# Patient Record
Sex: FEMALE | Race: WHITE | NOT HISPANIC OR LATINO | Employment: OTHER | URBAN - METROPOLITAN AREA
[De-identification: names, ages, dates, MRNs, and addresses within clinical notes are randomized per-mention and may not be internally consistent; named-entity substitution may affect disease eponyms.]

---

## 2017-01-12 ENCOUNTER — HISTORICAL (OUTPATIENT)
Dept: RADIOLOGY | Facility: HOSPITAL | Age: 82
End: 2017-01-12

## 2018-01-12 ENCOUNTER — HISTORICAL (OUTPATIENT)
Dept: RADIOLOGY | Facility: HOSPITAL | Age: 83
End: 2018-01-12

## 2020-01-13 ENCOUNTER — HISTORICAL (OUTPATIENT)
Dept: RADIOLOGY | Facility: HOSPITAL | Age: 85
End: 2020-01-13

## 2020-01-27 LAB
ABS NEUT (OLG): 4.5 X10(3)/MCL (ref 1.5–6.9)
ALBUMIN SERPL-MCNC: 3.4 GM/DL (ref 3.4–5)
ALBUMIN/GLOB SERPL: 0.9 RATIO
ALP SERPL-CCNC: 89 UNIT/L (ref 30–113)
ALT SERPL-CCNC: 27 UNIT/L (ref 10–45)
APPEARANCE, UA: CLEAR
AST SERPL-CCNC: 26 UNIT/L (ref 15–37)
BILIRUB SERPL-MCNC: 0.3 MG/DL (ref 0.1–0.9)
BILIRUB UR QL STRIP: NEGATIVE
BILIRUBIN DIRECT+TOT PNL SERPL-MCNC: 0.1 MG/DL (ref 0–0.3)
BILIRUBIN DIRECT+TOT PNL SERPL-MCNC: 0.2 MG/DL
BUN SERPL-MCNC: 13 MG/DL (ref 10–20)
CALCIUM SERPL-MCNC: 9 MG/DL (ref 8–10.5)
CHLORIDE SERPL-SCNC: 100 MMOL/L (ref 100–108)
CO2 SERPL-SCNC: 30 MMOL/L (ref 21–35)
COLOR UR: NORMAL
CREAT SERPL-MCNC: 0.66 MG/DL (ref 0.7–1.3)
ERYTHROCYTE [DISTWIDTH] IN BLOOD BY AUTOMATED COUNT: 13.2 % (ref 11.5–17)
GLOBULIN SER-MCNC: 3.9 GM/DL
GLUCOSE (UA): NEGATIVE
GLUCOSE SERPL-MCNC: 150 MG/DL (ref 75–116)
HCT VFR BLD AUTO: 41.7 % (ref 36–48)
HGB BLD-MCNC: 13.6 GM/DL (ref 12–16)
HGB UR QL STRIP: NEGATIVE
KETONES UR QL STRIP: NEGATIVE
LEUKOCYTE ESTERASE UR QL STRIP: NEGATIVE
MCH RBC QN AUTO: 31 PG (ref 27–34)
MCHC RBC AUTO-ENTMCNC: 33 GM/DL (ref 31–36)
MCV RBC AUTO: 95 FL (ref 80–99)
NITRITE UR QL STRIP: NEGATIVE
PH UR STRIP: 7.5 [PH]
PLATELET # BLD AUTO: 252 X10(3)/MCL (ref 140–400)
PMV BLD AUTO: 9.3 FL (ref 6.8–10)
POTASSIUM SERPL-SCNC: 4.6 MMOL/L (ref 3.6–5.2)
PROT SERPL-MCNC: 7.3 GM/DL (ref 6.4–8.2)
PROT UR QL STRIP: NEGATIVE
RBC # BLD AUTO: 4.38 X10(6)/MCL (ref 4.2–5.4)
SODIUM SERPL-SCNC: 135 MMOL/L (ref 135–145)
SP GR UR STRIP: 1.01
UROBILINOGEN UR STRIP-ACNC: 0.2 EU/DL
WBC # SPEC AUTO: 6.2 X10(3)/MCL (ref 4.5–11.5)

## 2020-01-28 ENCOUNTER — HISTORICAL (OUTPATIENT)
Dept: ANESTHESIOLOGY | Facility: HOSPITAL | Age: 85
End: 2020-01-28

## 2021-05-04 LAB
ABS NEUT (OLG): 4.2 X10(3)/MCL (ref 1.5–6.9)
ALBUMIN SERPL-MCNC: 4 GM/DL (ref 3.4–4.8)
ALBUMIN/GLOB SERPL: 1.1 RATIO (ref 1.1–2)
ALP SERPL-CCNC: 86 UNIT/L (ref 40–150)
ALT SERPL-CCNC: 24 UNIT/L (ref 0–55)
AST SERPL-CCNC: 39 UNIT/L (ref 5–34)
BILIRUB SERPL-MCNC: 0.4 MG/DL
BILIRUBIN DIRECT+TOT PNL SERPL-MCNC: 0.2 MG/DL (ref 0–0.5)
BILIRUBIN DIRECT+TOT PNL SERPL-MCNC: 0.2 MG/DL (ref 0–0.8)
BUN SERPL-MCNC: 20 MG/DL (ref 9.8–20.1)
CALCIUM SERPL-MCNC: 10.2 MG/DL (ref 8.4–10.2)
CHLORIDE SERPL-SCNC: 101 MMOL/L (ref 98–111)
CO2 SERPL-SCNC: 30 MMOL/L (ref 23–31)
CREAT SERPL-MCNC: 0.74 MG/DL (ref 0.55–1.02)
ERYTHROCYTE [DISTWIDTH] IN BLOOD BY AUTOMATED COUNT: 13.4 % (ref 11.5–17)
GLOBULIN SER-MCNC: 3.5 GM/DL (ref 2.4–3.5)
GLUCOSE SERPL-MCNC: 86 MG/DL (ref 75–121)
HCT VFR BLD AUTO: 43.4 % (ref 36–48)
HGB BLD-MCNC: 14.1 GM/DL (ref 12–16)
MCH RBC QN AUTO: 31 PG (ref 27–34)
MCHC RBC AUTO-ENTMCNC: 32 GM/DL (ref 31–36)
MCV RBC AUTO: 95 FL (ref 80–99)
PLATELET # BLD AUTO: 246 X10(3)/MCL (ref 140–400)
PMV BLD AUTO: 9.9 FL (ref 6.8–10)
POTASSIUM SERPL-SCNC: 4.4 MMOL/L (ref 3.5–5.1)
PROT SERPL-MCNC: 7.5 GM/DL (ref 5.8–7.6)
RBC # BLD AUTO: 4.55 X10(6)/MCL (ref 4.2–5.4)
SODIUM SERPL-SCNC: 139 MMOL/L (ref 132–146)
WBC # SPEC AUTO: 6 X10(3)/MCL (ref 4.5–11.5)

## 2021-05-07 ENCOUNTER — HISTORICAL (OUTPATIENT)
Dept: ANESTHESIOLOGY | Facility: HOSPITAL | Age: 86
End: 2021-05-07

## 2021-05-08 LAB — GRAM STN SPEC: NORMAL

## 2021-05-10 LAB — FINAL CULTURE: NORMAL

## 2021-06-07 LAB — FINAL CULTURE: NORMAL

## 2022-02-18 ENCOUNTER — HISTORICAL (OUTPATIENT)
Dept: ADMINISTRATIVE | Facility: HOSPITAL | Age: 87
End: 2022-02-18

## 2022-02-18 ENCOUNTER — HISTORICAL (OUTPATIENT)
Dept: RADIOLOGY | Facility: HOSPITAL | Age: 87
End: 2022-02-18

## 2022-04-10 ENCOUNTER — HISTORICAL (OUTPATIENT)
Dept: ADMINISTRATIVE | Facility: HOSPITAL | Age: 87
End: 2022-04-10

## 2022-04-11 ENCOUNTER — HISTORICAL (OUTPATIENT)
Dept: ADMINISTRATIVE | Facility: HOSPITAL | Age: 87
End: 2022-04-11

## 2022-04-11 ENCOUNTER — HISTORICAL (OUTPATIENT)
Dept: RADIOLOGY | Facility: HOSPITAL | Age: 87
End: 2022-04-11

## 2022-04-25 VITALS
WEIGHT: 100.06 LBS | DIASTOLIC BLOOD PRESSURE: 66 MMHG | BODY MASS INDEX: 18.41 KG/M2 | SYSTOLIC BLOOD PRESSURE: 113 MMHG | HEIGHT: 62 IN

## 2022-04-30 NOTE — OP NOTE
Patient:   Bhavani Galarza             MRN: [id number]            FIN: [id number]               Age:   89 years     Sex:  Female     :  10/11/1928   Associated Diagnoses:   Right inguinal hernia; Benign lipomatous neoplasm, unspecified   Author:   Batsheva Song MD      Operative Note   Operative Information   Date/ Time:  2020 12:21:00.     Procedures Performed: Procedure Code   Repair initial inguinal hernia, age 5 years or older; reducible ([id number])..     Indications: 89-year-old active female with complaint of right inguinal bulge and associated pain during times of exercise activities elected to undergo open right inguinal hernia repair with mesh for symptomatic inguinal hernia.     Preoperative Diagnosis: Right inguinal hernia (MHK96-AC K40.90).     Postoperative Diagnosis: Right inguinal hernia (VAS57-KR K40.90), Benign lipomatous neoplasm, unspecified (WHY28-WX D17.9).     Surgeon: Batsheva Song MD.     Anesthesia: General.     Speciman Removed: 1.  Round ligament  2.  Indirect hernia sac  3.  Inguinal lipoma.     Description of Procedure/Findings/    Complications: Patient was brought to the operative theater laid in supine position and general endotracheal intubation anesthesia provided.  Preoperative antibiotics administered.  The area of the right groin was sterilely prepped and draped in normal surgical fashion using chlorhexidine and trimmed while here.  Right inguinal crease was then infiltrated with 1% lidocaine and epinephrine.  #15 blade was used to incise the skin with dissection down to underlying fatty tissues up to the level of Alban's fascia.  Alban's fascia was then transected using Bovie cauterization with further dissection down to the external oblique fascia.  It was then opened from a lateral to medial direction using #15 blade and Metzenbaum scissors.  The inguinal floor was then inspected appear to be grossly intact with a bulge noted, within the deep inguinal ring.   The round ligament was identified along with a cord lipoma.  The round ligament was then transected and a portion sent to pathology and the cord lipoma was then dissected free from the hernia sac.  The hernia sac was then dissected up to the level of the deep inguinal ring and then ligated and transected using Metzenbaum scissors and a 2-0 silk.  It was then released back into the abdomen the internal ring was then obliterated using interrupted 3-0 Vicryl.  The floor was then reinforced with a segment of mesh and sutured inferiorly along the inguinal ligament and superiorly interrupted using 3-0 Prolene the cavity was made hemostatic using Bovie cauterization.  The external oblique fascia was reapproximated in a lateral to medial direction re-creating the external ring Alban's fascia was then reapproximated using 3-0 Vicryl running.  The skin and subcutaneous tissues were reapproximated in a multilayered fashion using 3-0 Vicryl and running 4-0 subcuticular Monocryl.  Sterile dressing was applied on the wound.  The patient was then relieved of anesthesia stable condition and transfer the postanesthesia care unit..     Esimated blood loss: loss  5  cc.     Findings: Indirect inguinal hernia with lipoma.     Complications: None.

## 2022-04-30 NOTE — OP NOTE
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  Ulceration with exposure of 4th left metatarsal head, left.    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  Ulceration with exposure of 4th left metatarsal head, left.    PROCEDURES PERFORMED:  Excision of 4th left metatarsal head, left foot.  Amputation of right 5th digit at the metatarsophalangeal level.    ANESTHESIA:  Local with MAC.    HEMOSTASIS:  Esmarch bandage about the ankles bilateral.    DRAINS:  None were used during this procedure.    DESCRIPTION OF PROCEDURE:  The patient was brought to the OR and placed in a supine position upon the operating room table.  At this point, a local anterior block totalling 30 cc of 0.5% Marcaine and 1% lidocaine plain in a 50/50 mixture was introduced about the 4th and 5th rays bilateral.  The patient was then prepped and draped in usual sterile fashion.     Surgical procedure began about the right 5th digit area, where an elliptical racquet-type incision was made about the base of the right 5th digit extending to just proximal to the 5th right metatarsal head.  All superficial bleeding vessels were ligated with the Bovie.  Dissection was carried down to the MP joint level, where the capsule was incised dorsal, plantar, and lateral and then the 5th digit was excised in total.  Once removed, this area was copiously irrigated with neomycin solution.  Deep tissues were reapproximated a 4-0 Vicryl in simple fashion.  The skin margins were reapproximated with a 4-0 nylon in simple fashion as well.  Once completed, a dry sterile dressing including Adaptic, 4 x 4's, a 2 and 3-inch Conform and a 3-inch Coban was applied to the right foot.     Attention was now directed to the left foot, where a linear incision made about the dorsal aspect of the left 4th digit extending from the PIP joint to just proximal to the 4th metatarsophalangeal joint area.   All superficial bleeding vessels were ligated with the Bovie.  Dissection was carried down to the 4th interspace, where the lateral aspect of the MPJ was identified.  It was transected with a 15 blade, and the periosteum was reflected dorsally and plantarly, thus revealing the 4th metatarsal head.  Upon evaluation, there was no exposure of the metatarsal head about the ulceration site.  The metatarsal head did not appear to be infected either.  There was no pus, drainage, or signs of infection within the soft tissue around the 4th interspace of the left foot.  Utilizing a sagittal saw, the 4th metatarsal was resected in total.  Once removed, this area was copiously irrigated with neomycin solution.  The capsule was reapproximated with a 4-0 Vicryl in simple fashion.  The skin margins were reapproximated with a 4-0 nylon in running interlocked type fashion.  Once completed, the ulceration site was completely debrided with both a 10 blade as well as a curette to facilitate pinpoint bleeding.  Postoperatively, a dry sterile dressing including Adaptic 4 x 4's, a 2 and 3-inch Conform and a 3-inch Coban was then applied to the left foot.     The patient tolerated both the procedure and anesthesia well.  Postoperatively, she will be placed on Augmentin 500 mg 1 tablet p.o. b.i.d., Norco 1 tablet every 4 to 6 hours p.r.n. for pain, and Phenergan 25 mg every 6 to 8 hours p.r.n. for nausea.  She will keep the bandages dry, clean, and intact.  She will follow up in the [place] office in approximately 5 days.        JAIRO/YAHAIRA   DD: 05/07/2021 0951   DT: 05/07/2021 1547  Job # [id number]

## 2023-05-10 DIAGNOSIS — I71.42: ICD-10-CM

## 2023-05-10 DIAGNOSIS — M79.605 LEFT LEG PAIN: Primary | ICD-10-CM

## 2023-05-10 DIAGNOSIS — M79.604 RIGHT LEG PAIN: ICD-10-CM

## 2023-05-29 ENCOUNTER — HOSPITAL ENCOUNTER (OUTPATIENT)
Dept: RADIOLOGY | Facility: HOSPITAL | Age: 88
Discharge: HOME | End: 2023-05-29
Attending: INTERNAL MEDICINE
Payer: MEDICARE

## 2023-05-29 DIAGNOSIS — M79.604 RIGHT LEG PAIN: ICD-10-CM

## 2023-05-29 DIAGNOSIS — I71.42: ICD-10-CM

## 2023-05-29 DIAGNOSIS — M79.605 LEFT LEG PAIN: ICD-10-CM

## 2023-05-29 PROCEDURE — 76775 US EXAM ABDO BACK WALL LIM: CPT | Mod: TC

## 2023-05-29 PROCEDURE — 93925 LOWER EXTREMITY STUDY: CPT | Mod: TC

## 2023-08-08 ENCOUNTER — HOSPITAL ENCOUNTER (OUTPATIENT)
Dept: RADIOLOGY | Facility: HOSPITAL | Age: 88
Discharge: HOME | End: 2023-08-08
Attending: INTERNAL MEDICINE
Payer: MEDICARE

## 2023-08-08 DIAGNOSIS — M79.10 MYALGIA, UNSPECIFIED SITE: ICD-10-CM

## 2023-08-08 PROCEDURE — 72100 X-RAY EXAM L-S SPINE 2/3 VWS: CPT | Mod: TC

## 2023-08-15 DIAGNOSIS — M54.50 LUMBAGO: Primary | ICD-10-CM

## 2023-08-17 ENCOUNTER — HOSPITAL ENCOUNTER (OUTPATIENT)
Dept: RADIOLOGY | Facility: HOSPITAL | Age: 88
Discharge: HOME | End: 2023-08-17
Attending: INTERNAL MEDICINE
Payer: MEDICARE

## 2023-08-17 DIAGNOSIS — M54.50 LUMBAGO: ICD-10-CM

## 2023-08-17 PROCEDURE — 72148 MRI LUMBAR SPINE W/O DYE: CPT | Mod: TC

## 2023-09-18 ENCOUNTER — ANESTHESIA EVENT (OUTPATIENT)
Dept: SURGERY | Facility: HOSPITAL | Age: 88
End: 2023-09-18
Payer: MEDICARE

## 2023-09-18 NOTE — ANESTHESIA PREPROCEDURE EVALUATION
09/18/2023  Bhavani Galarza is a 89 y.o., female.      Pre-op Assessment    I have reviewed the Patient Summary Reports.     I have reviewed the Nursing Notes. I have reviewed the NPO Status.   I have reviewed the Medications.     Review of Systems  Anesthesia Hx:  Denies Family Hx of Anesthesia complications.   Denies Personal Hx of Anesthesia complications.   Hematology/Oncology:  Hematology Normal   Oncology Normal     EENT/Dental:EENT/Dental Normal   Cardiovascular:  Cardiovascular Normal     Pulmonary:  Pulmonary Normal    Renal/:  Renal/ Normal     Hepatic/GI:  Hepatic/GI Normal    Musculoskeletal:  Musculoskeletal Normal    Neurological:  Neurology Normal    Endocrine:  Endocrine Normal    Dermatological:  Skin Normal    Psych:  Psychiatric Normal           Physical Exam  General: Cooperative, Alert and Oriented    Airway:  Mallampati: II   Mouth Opening: Normal  TM Distance: Normal  Tongue: Normal  Neck ROM: Normal ROM    Dental:  Intact        Anesthesia Plan  Type of Anesthesia, risks & benefits discussed:    Anesthesia Type: Gen Natural Airway  Intra-op Monitoring Plan: Standard ASA Monitors  Post Op Pain Control Plan:   (medical reason for not using multimodal pain management)  Induction:  IV  Informed Consent: Informed consent signed with the Patient and all parties understand the risks and agree with anesthesia plan.  All questions answered. Patient consented to blood products? Yes  ASA Score: 3    Ready For Surgery From Anesthesia Perspective.     .       H/O abdominal hysterectomy    Status post breast lumpectomy

## 2023-09-19 RX ORDER — GABAPENTIN 300 MG/1
300 CAPSULE ORAL 2 TIMES DAILY
COMMUNITY

## 2023-09-19 RX ORDER — ASPIRIN 81 MG/1
81 TABLET ORAL NIGHTLY
Status: ON HOLD | COMMUNITY
End: 2024-01-18

## 2023-09-22 ENCOUNTER — ANESTHESIA (OUTPATIENT)
Dept: SURGERY | Facility: HOSPITAL | Age: 88
End: 2023-09-22
Payer: MEDICARE

## 2023-09-22 ENCOUNTER — HOSPITAL ENCOUNTER (OUTPATIENT)
Facility: HOSPITAL | Age: 88
Discharge: HOME | End: 2023-09-22
Attending: ANESTHESIOLOGY | Admitting: ANESTHESIOLOGY
Payer: MEDICARE

## 2023-09-22 VITALS
BODY MASS INDEX: 17.94 KG/M2 | SYSTOLIC BLOOD PRESSURE: 159 MMHG | OXYGEN SATURATION: 99 % | WEIGHT: 95 LBS | DIASTOLIC BLOOD PRESSURE: 79 MMHG | HEIGHT: 61 IN | RESPIRATION RATE: 18 BRPM | TEMPERATURE: 95 F | HEART RATE: 67 BPM

## 2023-09-22 DIAGNOSIS — M48.061 SPINAL STENOSIS OF LUMBAR REGION: ICD-10-CM

## 2023-09-22 PROBLEM — M48.062 LUMBAR STENOSIS WITH NEUROGENIC CLAUDICATION: Chronic | Status: ACTIVE | Noted: 2023-09-22

## 2023-09-22 PROCEDURE — 63600175 PHARM REV CODE 636 W HCPCS: Performed by: ANESTHESIOLOGY

## 2023-09-22 PROCEDURE — 25500020 PHARM REV CODE 255: Performed by: ANESTHESIOLOGY

## 2023-09-22 PROCEDURE — D9220A PRA ANESTHESIA: Mod: ,,, | Performed by: NURSE ANESTHETIST, CERTIFIED REGISTERED

## 2023-09-22 PROCEDURE — 64484 NJX AA&/STRD TFRM EPI L/S EA: CPT | Mod: RT | Performed by: ANESTHESIOLOGY

## 2023-09-22 PROCEDURE — 37000008 HC ANESTHESIA 1ST 15 MINUTES: Performed by: ANESTHESIOLOGY

## 2023-09-22 PROCEDURE — 64483 NJX AA&/STRD TFRM EPI L/S 1: CPT | Mod: RT | Performed by: ANESTHESIOLOGY

## 2023-09-22 PROCEDURE — D9220A PRA ANESTHESIA: ICD-10-PCS | Mod: ,,, | Performed by: NURSE ANESTHETIST, CERTIFIED REGISTERED

## 2023-09-22 PROCEDURE — 63600175 PHARM REV CODE 636 W HCPCS: Performed by: NURSE ANESTHETIST, CERTIFIED REGISTERED

## 2023-09-22 RX ORDER — FENTANYL CITRATE 50 UG/ML
INJECTION, SOLUTION INTRAMUSCULAR; INTRAVENOUS
Status: DISCONTINUED | OUTPATIENT
Start: 2023-09-22 | End: 2023-09-22

## 2023-09-22 RX ORDER — BUPIVACAINE HYDROCHLORIDE 2.5 MG/ML
INJECTION, SOLUTION EPIDURAL; INFILTRATION; INTRACAUDAL
Status: DISCONTINUED | OUTPATIENT
Start: 2023-09-22 | End: 2023-09-22 | Stop reason: HOSPADM

## 2023-09-22 RX ORDER — SODIUM CHLORIDE, SODIUM LACTATE, POTASSIUM CHLORIDE, CALCIUM CHLORIDE 600; 310; 30; 20 MG/100ML; MG/100ML; MG/100ML; MG/100ML
INJECTION, SOLUTION INTRAVENOUS CONTINUOUS
Status: DISCONTINUED | OUTPATIENT
Start: 2023-09-22 | End: 2023-09-22 | Stop reason: HOSPADM

## 2023-09-22 RX ORDER — DEXAMETHASONE SODIUM PHOSPHATE 10 MG/ML
INJECTION INTRAMUSCULAR; INTRAVENOUS
Status: DISCONTINUED | OUTPATIENT
Start: 2023-09-22 | End: 2023-09-22 | Stop reason: HOSPADM

## 2023-09-22 RX ORDER — IOPAMIDOL 612 MG/ML
INJECTION, SOLUTION INTRATHECAL
Status: DISCONTINUED | OUTPATIENT
Start: 2023-09-22 | End: 2023-09-22 | Stop reason: HOSPADM

## 2023-09-22 RX ORDER — MIDAZOLAM HYDROCHLORIDE 1 MG/ML
INJECTION INTRAMUSCULAR; INTRAVENOUS
Status: DISCONTINUED | OUTPATIENT
Start: 2023-09-22 | End: 2023-09-22

## 2023-09-22 RX ADMIN — FENTANYL CITRATE 50 MCG: 50 INJECTION, SOLUTION INTRAMUSCULAR; INTRAVENOUS at 06:09

## 2023-09-22 RX ADMIN — MIDAZOLAM HYDROCHLORIDE 1 MG: 1 INJECTION, SOLUTION INTRAMUSCULAR; INTRAVENOUS at 06:09

## 2023-09-22 RX ADMIN — SODIUM CHLORIDE, POTASSIUM CHLORIDE, SODIUM LACTATE AND CALCIUM CHLORIDE: 600; 310; 30; 20 INJECTION, SOLUTION INTRAVENOUS at 05:09

## 2023-09-22 RX ADMIN — MIDAZOLAM HYDROCHLORIDE 1 MG: 1 INJECTION, SOLUTION INTRAMUSCULAR; INTRAVENOUS at 07:09

## 2023-09-22 RX ADMIN — FENTANYL CITRATE 50 MCG: 50 INJECTION, SOLUTION INTRAMUSCULAR; INTRAVENOUS at 07:09

## 2023-09-22 NOTE — ANESTHESIA POSTPROCEDURE EVALUATION
Anesthesia Post Evaluation    Patient: Bhavani Galarza    Procedure(s) Performed: Procedure(s) (LRB):  INJECTION, STEROID, EPIDURAL, TRANSFORAMINAL APPROACH (Right TFESI L4 & L5) (Right)    Final Anesthesia Type: MAC      Patient participation: Yes- Able to Participate  Level of consciousness: awake and alert  Post-procedure vital signs: reviewed and stable  Pain management: adequate  Airway patency: patent    PONV status at discharge: No PONV  Anesthetic complications: no      Cardiovascular status: blood pressure returned to baseline  Respiratory status: unassisted  Hydration status: euvolemic  Follow-up not needed.          Vitals Value Taken Time   /71 09/22/23 0536   Temp 35.2 °C (95.4 °F) 09/22/23 0536   Pulse 68 09/22/23 0536   Resp 18 09/22/23 0536   SpO2 97 % 09/22/23 0536         No case tracking events are documented in the log.      Pain/Anselmo Score: No data recorded

## 2023-09-22 NOTE — H&P
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     Current Outpatient Medications on File Prior to Encounter   Medication Sig Dispense Refill    aspirin (ECOTRIN) 81 MG EC tablet Take 81 mg by mouth every evening. Stopped 9/21/23      gabapentin (NEURONTIN) 300 MG capsule Take 300 mg by mouth 2 (two) times daily.          PMHx, PSHx, Allergies, Medications reviewed in epic    ROS negative except pain complaints in HPI    OBJECTIVE:    BP (!) 150/71   Pulse 68   Temp (!) 95.4 °F (35.2 °C)   Resp 18   Ht 5' 1" (1.549 m)   Wt 43.1 kg (95 lb)   SpO2 97%   Breastfeeding No   BMI 17.95 kg/m²     PHYSICAL EXAMINATION:    GENERAL: Well appearing, in no acute distress, alert and oriented x3.  PSYCH:  Mood and affect appropriate.  SKIN: Skin color, texture, turgor normal, no rashes or lesions which will impact the procedure.  CV: RRR with palpation of the radial artery.  PULM: No evidence of respiratory difficulty, symmetric chest rise. Clear to auscultation.  NEURO: Cranial nerves grossly intact.    Plan:    Proceed with procedure as planned Procedure(s) (LRB):  INJECTION, STEROID, EPIDURAL, TRANSFORAMINAL APPROACH (Right TFESI L4 & L5) (Right)    Amrit Song MD  09/22/2023            "

## 2023-09-22 NOTE — DISCHARGE SUMMARY
[facility] - Periop Services  Discharge Note  Short Stay    Procedure(s) (LRB):  INJECTION, STEROID, EPIDURAL, TRANSFORAMINAL APPROACH (Right TFESI L4 & L5) (Right)      OUTCOME: Patient tolerated treatment/procedure well without complication and is now ready for discharge.    DISPOSITION: Home or Self Care    FINAL DIAGNOSIS:  Lumbar stenosis with neurogenic claudication    FOLLOWUP: In clinic    DISCHARGE INSTRUCTIONS:  No discharge procedures on file.      Clinical Reference Documents Added to Patient Instructions         Document    EPIDURAL INJECTION (ENGLISH)            TIME SPENT ON DISCHARGE: 2 minutes

## 2023-10-18 ENCOUNTER — ANESTHESIA EVENT (OUTPATIENT)
Dept: SURGERY | Facility: HOSPITAL | Age: 88
End: 2023-10-18
Payer: MEDICARE

## 2023-10-18 NOTE — ANESTHESIA PREPROCEDURE EVALUATION
10/18/2023  Bhavani Galarza is a 89 y.o., female.      Pre-op Assessment    I have reviewed the Patient Summary Reports.     I have reviewed the Nursing Notes. I have reviewed the NPO Status.   I have reviewed the Medications.     Review of Systems  Anesthesia Hx:  Denies Family Hx of Anesthesia complications.   Denies Personal Hx of Anesthesia complications.   Social:  No Alcohol Use, Non-Smoker    Hematology/Oncology:  Hematology Normal   Oncology Normal     EENT/Dental:EENT/Dental Normal   Cardiovascular:  Cardiovascular Normal     Pulmonary:  Pulmonary Normal    Renal/:  Renal/ Normal     Hepatic/GI:  Hepatic/GI Normal    Musculoskeletal:  Musculoskeletal Normal    Neurological:  Neurology Normal    Endocrine:  Endocrine Normal    Dermatological:  Skin Normal    Psych:  Psychiatric Normal           Physical Exam  General: Cooperative, Alert and Oriented    Airway:  Mallampati: II   Mouth Opening: Normal  TM Distance: Normal  Tongue: Normal  Neck ROM: Normal ROM    Dental:  Intact        Anesthesia Plan  Type of Anesthesia, risks & benefits discussed:    Anesthesia Type: MAC  Intra-op Monitoring Plan: Standard ASA Monitors  Post Op Pain Control Plan:   (medical reason for not using multimodal pain management)  Induction:  IV  Informed Consent: Informed consent signed with the Patient and all parties understand the risks and agree with anesthesia plan.  All questions answered. Patient consented to blood products? Yes  ASA Score: 1    Ready For Surgery From Anesthesia Perspective.     .

## 2023-10-20 ENCOUNTER — ANESTHESIA (OUTPATIENT)
Dept: SURGERY | Facility: HOSPITAL | Age: 88
End: 2023-10-20
Payer: MEDICARE

## 2023-10-20 ENCOUNTER — HOSPITAL ENCOUNTER (OUTPATIENT)
Facility: HOSPITAL | Age: 88
Discharge: HOME | End: 2023-10-20
Attending: ANESTHESIOLOGY | Admitting: ANESTHESIOLOGY
Payer: MEDICARE

## 2023-10-20 VITALS
DIASTOLIC BLOOD PRESSURE: 77 MMHG | WEIGHT: 90 LBS | RESPIRATION RATE: 16 BRPM | HEART RATE: 69 BPM | OXYGEN SATURATION: 96 % | SYSTOLIC BLOOD PRESSURE: 134 MMHG | BODY MASS INDEX: 16.99 KG/M2 | HEIGHT: 61 IN

## 2023-10-20 DIAGNOSIS — M51.16 RADICULOPATHY DUE TO LUMBAR INTERVERTEBRAL DISC DISORDER: ICD-10-CM

## 2023-10-20 PROCEDURE — 37000008 HC ANESTHESIA 1ST 15 MINUTES: Performed by: ANESTHESIOLOGY

## 2023-10-20 PROCEDURE — D9220A PRA ANESTHESIA: Mod: ICN,,, | Performed by: NURSE ANESTHETIST, CERTIFIED REGISTERED

## 2023-10-20 PROCEDURE — 63600175 PHARM REV CODE 636 W HCPCS: Performed by: ANESTHESIOLOGY

## 2023-10-20 PROCEDURE — 25500020 PHARM REV CODE 255: Performed by: ANESTHESIOLOGY

## 2023-10-20 PROCEDURE — 62323 NJX INTERLAMINAR LMBR/SAC: CPT | Performed by: ANESTHESIOLOGY

## 2023-10-20 PROCEDURE — 37000009 HC ANESTHESIA EA ADD 15 MINS: Performed by: ANESTHESIOLOGY

## 2023-10-20 PROCEDURE — 63600175 PHARM REV CODE 636 W HCPCS: Performed by: NURSE ANESTHETIST, CERTIFIED REGISTERED

## 2023-10-20 PROCEDURE — D9220A PRA ANESTHESIA: ICD-10-PCS | Mod: ICN,,, | Performed by: NURSE ANESTHETIST, CERTIFIED REGISTERED

## 2023-10-20 RX ORDER — BUPIVACAINE HYDROCHLORIDE 2.5 MG/ML
INJECTION, SOLUTION EPIDURAL; INFILTRATION; INTRACAUDAL
Status: DISCONTINUED | OUTPATIENT
Start: 2023-10-20 | End: 2023-10-20 | Stop reason: HOSPADM

## 2023-10-20 RX ORDER — MIDAZOLAM HYDROCHLORIDE 1 MG/ML
INJECTION INTRAMUSCULAR; INTRAVENOUS
Status: DISCONTINUED | OUTPATIENT
Start: 2023-10-20 | End: 2023-10-20

## 2023-10-20 RX ORDER — SODIUM CHLORIDE, SODIUM LACTATE, POTASSIUM CHLORIDE, CALCIUM CHLORIDE 600; 310; 30; 20 MG/100ML; MG/100ML; MG/100ML; MG/100ML
INJECTION, SOLUTION INTRAVENOUS CONTINUOUS
Status: ACTIVE | OUTPATIENT
Start: 2023-10-20

## 2023-10-20 RX ORDER — METHYLPREDNISOLONE ACETATE 80 MG/ML
INJECTION, SUSPENSION INTRA-ARTICULAR; INTRALESIONAL; INTRAMUSCULAR; SOFT TISSUE
Status: DISCONTINUED | OUTPATIENT
Start: 2023-10-20 | End: 2023-10-20 | Stop reason: HOSPADM

## 2023-10-20 RX ORDER — FENTANYL CITRATE 50 UG/ML
INJECTION, SOLUTION INTRAMUSCULAR; INTRAVENOUS
Status: DISCONTINUED | OUTPATIENT
Start: 2023-10-20 | End: 2023-10-20

## 2023-10-20 RX ADMIN — FENTANYL CITRATE 50 MCG: 50 INJECTION, SOLUTION INTRAMUSCULAR; INTRAVENOUS at 10:10

## 2023-10-20 RX ADMIN — MIDAZOLAM HYDROCHLORIDE 1 MG: 1 INJECTION, SOLUTION INTRAMUSCULAR; INTRAVENOUS at 10:10

## 2023-10-20 NOTE — ANESTHESIA POSTPROCEDURE EVALUATION
Anesthesia Post Evaluation    Patient: Bhavani Galarza    Procedure(s) Performed: Procedure(s) (LRB):  INJECTION, STEROID, SPINE, LUMBAR, EPIDURAL (Right L5- S1 ILESI) (Right)    Final Anesthesia Type: MAC      Patient location during evaluation: OPS  Patient participation: Yes- Able to Participate  Level of consciousness: awake and alert  Post-procedure vital signs: reviewed and stable  Pain management: adequate  Airway patency: patent  BABITA mitigation strategies: Multimodal analgesia  PONV status at discharge: No PONV  Anesthetic complications: no      Cardiovascular status: hemodynamically stable  Respiratory status: unassisted, spontaneous ventilation and room air  Hydration status: euvolemic  Follow-up not needed.  Comments: Patient to bed per self          Vitals  Taken Time   BP  10/20/23 1035   Temp  10/20/23 1035   Pulse  10/20/23 1035   Resp  10/20/23 1035   SpO2  10/20/23 1035         No case tracking events are documented in the log.      Pain/Anselmo Score: No data recorded

## 2023-10-20 NOTE — DISCHARGE SUMMARY
[facility] - Periop Services  Discharge Note  Short Stay    Procedure(s) (LRB):  INJECTION, STEROID, SPINE, LUMBAR, EPIDURAL (Right L5- S1 ILESI) (Right)      OUTCOME: Patient tolerated treatment/procedure well without complication and is now ready for discharge.    DISPOSITION: Home or Self Care    FINAL DIAGNOSIS:  Lumbar stenosis with neurogenic claudication    FOLLOWUP: In clinic    DISCHARGE INSTRUCTIONS:  No discharge procedures on file.      Clinical Reference Documents Added to Patient Instructions         Document    EPIDURAL INJECTION (ENGLISH)            TIME SPENT ON DISCHARGE: 2 minutes

## 2023-10-20 NOTE — H&P
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     Current Outpatient Medications on File Prior to Encounter   Medication Sig Dispense Refill    gabapentin (NEURONTIN) 300 MG capsule Take 300 mg by mouth 2 (two) times daily.      aspirin (ECOTRIN) 81 MG EC tablet Take 81 mg by mouth every evening. Stopped 9/21/23          PMHx, PSHx, Allergies, Medications reviewed in epic    ROS negative except pain complaints in HPI    OBJECTIVE:    BP (!) 160/90 (BP Location: Right arm)   Pulse 71   Resp 20   Ht 5' 0.98" (1.549 m)   Wt 40.8 kg (90 lb)   SpO2 (!) 94%   Breastfeeding No   BMI 17.01 kg/m²     PHYSICAL EXAMINATION:    GENERAL: Well appearing, in no acute distress, alert and oriented x3.  PSYCH:  Mood and affect appropriate.  SKIN: Skin color, texture, turgor normal, no rashes or lesions which will impact the procedure.  CV: RRR with palpation of the radial artery.  PULM: No evidence of respiratory difficulty, symmetric chest rise. Clear to auscultation.  NEURO: Cranial nerves grossly intact.    Plan:    Proceed with procedure as planned Procedure(s) (LRB):  INJECTION, STEROID, SPINE, LUMBAR, EPIDURAL (Right L4-5 ILESI) (Right)    Amrit Song MD  10/20/2023            "

## 2023-12-26 ENCOUNTER — HOSPITAL ENCOUNTER (INPATIENT)
Facility: HOSPITAL | Age: 88
LOS: 8 days | Discharge: NURSING, REHAB OR LONG-TERM CARE | DRG: 064 | End: 2024-01-03
Attending: INTERNAL MEDICINE | Admitting: INTERNAL MEDICINE
Payer: MEDICARE

## 2023-12-26 DIAGNOSIS — I69.952 HEMIPLGA FOL UNSP CEREBVASC DISEASE AFF LEFT DOMINANT SIDE: ICD-10-CM

## 2023-12-26 DIAGNOSIS — R09.02 HYPOXEMIA: ICD-10-CM

## 2023-12-26 DIAGNOSIS — R40.4 TRANSIENT ALTERATION OF AWARENESS: ICD-10-CM

## 2023-12-26 DIAGNOSIS — R06.02 SHORTNESS OF BREATH: ICD-10-CM

## 2023-12-26 DIAGNOSIS — I63.9 CVA (CEREBRAL VASCULAR ACCIDENT): ICD-10-CM

## 2023-12-26 DIAGNOSIS — J18.9 PNEUMONIA OF RIGHT LOWER LOBE DUE TO INFECTIOUS ORGANISM: Primary | ICD-10-CM

## 2023-12-26 DIAGNOSIS — N30.00 ACUTE CYSTITIS WITHOUT HEMATURIA: ICD-10-CM

## 2023-12-26 DIAGNOSIS — T14.90XA TRAUMA: ICD-10-CM

## 2023-12-26 PROBLEM — G93.41 ENCEPHALOPATHY, METABOLIC: Status: ACTIVE | Noted: 2023-12-26

## 2023-12-26 PROBLEM — E87.1 HYPONATREMIA: Status: ACTIVE | Noted: 2023-12-26

## 2023-12-26 PROBLEM — S90.32XA: Status: ACTIVE | Noted: 2023-12-26

## 2023-12-26 LAB
ALBUMIN SERPL-MCNC: 3.4 G/DL (ref 3.4–4.8)
ALBUMIN/GLOB SERPL: 1 RATIO (ref 1.1–2)
ALP SERPL-CCNC: 85 UNIT/L (ref 40–150)
ALT SERPL-CCNC: 39 UNIT/L (ref 0–55)
AMORPH PHOS CRY URNS QL MICRO: ABNORMAL /HPF
APPEARANCE UR: ABNORMAL
AST SERPL-CCNC: 46 UNIT/L (ref 5–34)
BACTERIA #/AREA URNS AUTO: ABNORMAL /HPF
BASOPHILS # BLD AUTO: 0.04 X10(3)/MCL
BASOPHILS NFR BLD AUTO: 0.4 %
BILIRUB SERPL-MCNC: 0.8 MG/DL
BILIRUB UR QL STRIP.AUTO: NEGATIVE
BUN SERPL-MCNC: 13 MG/DL (ref 9.8–20.1)
CALCIUM SERPL-MCNC: 8.7 MG/DL (ref 8.4–10.2)
CHLORIDE SERPL-SCNC: 99 MMOL/L (ref 98–111)
CO2 SERPL-SCNC: 24 MMOL/L (ref 23–31)
COLOR UR AUTO: YELLOW
CREAT SERPL-MCNC: 0.67 MG/DL (ref 0.55–1.02)
EOSINOPHIL # BLD AUTO: 0 X10(3)/MCL (ref 0–0.9)
EOSINOPHIL NFR BLD AUTO: 0 %
ERYTHROCYTE [DISTWIDTH] IN BLOOD BY AUTOMATED COUNT: 13.2 % (ref 11.5–17)
FLUAV AG UPPER RESP QL IA.RAPID: NOT DETECTED
FLUBV AG UPPER RESP QL IA.RAPID: NOT DETECTED
GFR SERPLBLD CREATININE-BSD FMLA CKD-EPI: >60 MLS/MIN/1.73/M2
GLOBULIN SER-MCNC: 3.3 GM/DL (ref 2.4–3.5)
GLUCOSE SERPL-MCNC: 121 MG/DL (ref 75–121)
GLUCOSE UR QL STRIP.AUTO: NEGATIVE
HCT VFR BLD AUTO: 38.6 % (ref 37–47)
HGB BLD-MCNC: 13.1 G/DL (ref 12–16)
IMM GRANULOCYTES # BLD AUTO: 0.04 X10(3)/MCL (ref 0–0.04)
IMM GRANULOCYTES NFR BLD AUTO: 0.4 %
KETONES UR QL STRIP.AUTO: NEGATIVE
LEUKOCYTE ESTERASE UR QL STRIP.AUTO: NEGATIVE
LYMPHOCYTES # BLD AUTO: 0.62 X10(3)/MCL (ref 0.6–4.6)
LYMPHOCYTES NFR BLD AUTO: 6 %
MCH RBC QN AUTO: 32 PG (ref 27–31)
MCHC RBC AUTO-ENTMCNC: 33.9 G/DL (ref 33–36)
MCV RBC AUTO: 94.1 FL (ref 80–94)
MONOCYTES # BLD AUTO: 0.68 X10(3)/MCL (ref 0.1–1.3)
MONOCYTES NFR BLD AUTO: 6.6 %
NEUTROPHILS # BLD AUTO: 8.99 X10(3)/MCL (ref 2.1–9.2)
NEUTROPHILS NFR BLD AUTO: 86.6 %
NITRITE UR QL STRIP.AUTO: NEGATIVE
PH UR STRIP.AUTO: 8.5 [PH]
PLATELET # BLD AUTO: 294 X10(3)/MCL (ref 130–400)
PMV BLD AUTO: 9.2 FL (ref 7.4–10.4)
POTASSIUM SERPL-SCNC: 4.4 MMOL/L (ref 3.5–5.1)
PROT SERPL-MCNC: 6.7 GM/DL (ref 5.8–7.6)
PROT UR QL STRIP.AUTO: NEGATIVE
RBC # BLD AUTO: 4.1 X10(6)/MCL (ref 4.2–5.4)
RBC #/AREA URNS AUTO: ABNORMAL /HPF
RBC UR QL AUTO: ABNORMAL
RSV A 5' UTR RNA NPH QL NAA+PROBE: NOT DETECTED
SARS-COV-2 RNA RESP QL NAA+PROBE: NOT DETECTED
SODIUM SERPL-SCNC: 129 MMOL/L (ref 132–146)
SP GR UR STRIP.AUTO: 1.01 (ref 1–1.03)
SQUAMOUS #/AREA URNS AUTO: ABNORMAL /HPF
UROBILINOGEN UR STRIP-ACNC: 0.2
WBC # SPEC AUTO: 10.37 X10(3)/MCL (ref 4.5–11.5)
WBC #/AREA URNS AUTO: ABNORMAL /HPF

## 2023-12-26 PROCEDURE — 63600175 PHARM REV CODE 636 W HCPCS: Performed by: INTERNAL MEDICINE

## 2023-12-26 PROCEDURE — 83690 ASSAY OF LIPASE: CPT | Performed by: INTERNAL MEDICINE

## 2023-12-26 PROCEDURE — 85025 COMPLETE CBC W/AUTO DIFF WBC: CPT | Performed by: INTERNAL MEDICINE

## 2023-12-26 PROCEDURE — 25000003 PHARM REV CODE 250: Performed by: INTERNAL MEDICINE

## 2023-12-26 PROCEDURE — 21400001 HC TELEMETRY ROOM

## 2023-12-26 PROCEDURE — 94761 N-INVAS EAR/PLS OXIMETRY MLT: CPT

## 2023-12-26 PROCEDURE — 87040 BLOOD CULTURE FOR BACTERIA: CPT | Performed by: INTERNAL MEDICINE

## 2023-12-26 PROCEDURE — 81003 URINALYSIS AUTO W/O SCOPE: CPT | Performed by: INTERNAL MEDICINE

## 2023-12-26 PROCEDURE — 84443 ASSAY THYROID STIM HORMONE: CPT | Performed by: INTERNAL MEDICINE

## 2023-12-26 PROCEDURE — 27000221 HC OXYGEN, UP TO 24 HOURS

## 2023-12-26 PROCEDURE — 99285 EMERGENCY DEPT VISIT HI MDM: CPT | Mod: 25

## 2023-12-26 PROCEDURE — 83735 ASSAY OF MAGNESIUM: CPT | Performed by: INTERNAL MEDICINE

## 2023-12-26 PROCEDURE — 80053 COMPREHEN METABOLIC PANEL: CPT | Performed by: INTERNAL MEDICINE

## 2023-12-26 PROCEDURE — 11000001 HC ACUTE MED/SURG PRIVATE ROOM

## 2023-12-26 PROCEDURE — 94640 AIRWAY INHALATION TREATMENT: CPT

## 2023-12-26 PROCEDURE — 0241U COVID/RSV/FLU A&B PCR: CPT | Performed by: INTERNAL MEDICINE

## 2023-12-26 PROCEDURE — 96360 HYDRATION IV INFUSION INIT: CPT

## 2023-12-26 PROCEDURE — 25000242 PHARM REV CODE 250 ALT 637 W/ HCPCS: Performed by: INTERNAL MEDICINE

## 2023-12-26 PROCEDURE — 82150 ASSAY OF AMYLASE: CPT | Performed by: INTERNAL MEDICINE

## 2023-12-26 RX ORDER — SODIUM CHLORIDE 9 MG/ML
INJECTION, SOLUTION INTRAVENOUS CONTINUOUS
Status: DISCONTINUED | OUTPATIENT
Start: 2023-12-26 | End: 2023-12-27

## 2023-12-26 RX ORDER — POLYETHYLENE GLYCOL 3350 17 G/17G
17 POWDER, FOR SOLUTION ORAL DAILY
Status: DISCONTINUED | OUTPATIENT
Start: 2023-12-27 | End: 2024-01-03 | Stop reason: HOSPADM

## 2023-12-26 RX ORDER — ONDANSETRON 2 MG/ML
4 INJECTION INTRAMUSCULAR; INTRAVENOUS EVERY 8 HOURS PRN
Status: DISCONTINUED | OUTPATIENT
Start: 2023-12-26 | End: 2024-01-03 | Stop reason: HOSPADM

## 2023-12-26 RX ORDER — ENOXAPARIN SODIUM 100 MG/ML
40 INJECTION SUBCUTANEOUS EVERY 24 HOURS
Status: DISCONTINUED | OUTPATIENT
Start: 2023-12-26 | End: 2024-01-03 | Stop reason: HOSPADM

## 2023-12-26 RX ORDER — ASPIRIN 81 MG/1
81 TABLET ORAL NIGHTLY
Status: DISCONTINUED | OUTPATIENT
Start: 2023-12-26 | End: 2023-12-27

## 2023-12-26 RX ORDER — IPRATROPIUM BROMIDE AND ALBUTEROL SULFATE 2.5; .5 MG/3ML; MG/3ML
3 SOLUTION RESPIRATORY (INHALATION) EVERY 6 HOURS
Status: DISCONTINUED | OUTPATIENT
Start: 2023-12-26 | End: 2024-01-02

## 2023-12-26 RX ORDER — TALC
6 POWDER (GRAM) TOPICAL NIGHTLY PRN
Status: DISCONTINUED | OUTPATIENT
Start: 2023-12-26 | End: 2024-01-03 | Stop reason: HOSPADM

## 2023-12-26 RX ORDER — SODIUM CHLORIDE 0.9 % (FLUSH) 0.9 %
10 SYRINGE (ML) INJECTION
Status: DISCONTINUED | OUTPATIENT
Start: 2023-12-26 | End: 2023-12-27

## 2023-12-26 RX ORDER — ACETAMINOPHEN 325 MG/1
650 TABLET ORAL EVERY 8 HOURS PRN
Status: DISCONTINUED | OUTPATIENT
Start: 2023-12-26 | End: 2023-12-30

## 2023-12-26 RX ORDER — TRAMADOL HYDROCHLORIDE 50 MG/1
50 TABLET ORAL EVERY 6 HOURS PRN
Status: ON HOLD | COMMUNITY
End: 2024-01-03 | Stop reason: HOSPADM

## 2023-12-26 RX ADMIN — ACETAMINOPHEN 650 MG: 325 TABLET, FILM COATED ORAL at 10:12

## 2023-12-26 RX ADMIN — AZITHROMYCIN MONOHYDRATE 500 MG: 500 INJECTION, POWDER, LYOPHILIZED, FOR SOLUTION INTRAVENOUS at 03:12

## 2023-12-26 RX ADMIN — ENOXAPARIN SODIUM 40 MG: 40 INJECTION SUBCUTANEOUS at 09:12

## 2023-12-26 RX ADMIN — SODIUM CHLORIDE 1000 ML: 9 INJECTION, SOLUTION INTRAVENOUS at 12:12

## 2023-12-26 RX ADMIN — IPRATROPIUM BROMIDE AND ALBUTEROL SULFATE 3 ML: 2.5; .5 SOLUTION RESPIRATORY (INHALATION) at 07:12

## 2023-12-26 RX ADMIN — CEFTRIAXONE SODIUM 1 G: 1 INJECTION, POWDER, FOR SOLUTION INTRAMUSCULAR; INTRAVENOUS at 04:12

## 2023-12-26 RX ADMIN — ASPIRIN 81 MG: 81 TABLET, COATED ORAL at 10:12

## 2023-12-26 RX ADMIN — SODIUM CHLORIDE: 9 INJECTION, SOLUTION INTRAVENOUS at 09:12

## 2023-12-26 NOTE — ED PROVIDER NOTES
12/26/2023         11:54 AM    Source of History:  History obtained from patient and neighbor.     Chief complaint:  From Nurse Triage:  Leg Pain (Patient has history of  jabier leg pain but last night the pain got worse. On tramadol and gabapentin.)    HISTORY OF PRESENT ILLNES:  Bhavani Galarza is a 89 y.o. 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 presenting with Leg Pain (Patient has history of  jabier leg pain but last night the pain got worse. On tramadol and gabapentin.)  Patient called ambulance for left lower extremity pain.  Upon arrival patient is confused.  Unknown history of whether a fall occurred or not.      REVIEW OF SYSTEMS:   Constitutional symptoms:     Skin symptoms:      Eye symptoms:     ENMT symptoms:      Respiratory symptoms:      Cardiovascular symptoms:     Gastrointestinal symptoms:      Genitourinary symptoms:     Musculoskeletal symptoms:      Neurologic symptoms:      Psychiatric symptoms:               Additional review of systems information: Patient Denies Any Other Complaints.    All Other Systems Reviewed With Patient And Negative.    ALLEGIES:  Review of patient's allergies indicates:  No Known Allergies    MEDICINE LIST:  Current Outpatient Medications   Medication Instructions    aspirin (ECOTRIN) 81 mg, Oral, Nightly, Stopped 9/21/23    gabapentin (NEURONTIN) 300 mg, Oral, 2 times daily        PMH:  As per HPI and below:    Reviewed and updated in chart.    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         PAST SURGICAL HISTORY:  Past Surgical History:   Procedure Laterality Date    COLONOSCOPY      X 2    EPIDURAL STEROID INJECTION INTO LUMBAR SPINE Right 10/20/2023    Procedure: INJECTION, STEROID, SPINE, LUMBAR, EPIDURAL (Right L5- S1 ILESI);  Surgeon: Amrit Song MD;  Location: [facility];  Service: Pain Management;  Laterality:  Right;    TONSILLECTOMY      TRANSFORAMINAL EPIDURAL INJECTION OF STEROID Right 9/22/2023    Procedure: INJECTION, STEROID, EPIDURAL, TRANSFORAMINAL APPROACH (Right TFESI L4 & L5);  Surgeon: Amrit Song MD;  Location: [facility];  Service: Pain Management;  Laterality: Right;       SOCIAL HISTORY:  Social History     Tobacco Use    Smoking status: Never    Smokeless tobacco: Never   Substance Use Topics    Alcohol use: Not Currently    Drug use: Never       FAMILY HISTORY:  Family History   Problem Relation Age of Onset    No Known Problems Mother     Heart attack Father         PROBLEM LIST:  Patient Active Problem List   Diagnosis    Lumbar stenosis with neurogenic claudication        PHYSICAL EXAM:      ED Triage Vitals [12/26/23 1050]   BP (!) 154/87   Pulse 94   Resp 18   Temp 99.1 °F (37.3 °C)   SpO2 99 %        Vital Signs: Reviewed As In Chart.  General:  Alert, No Cardiorespiratory Distress Noted.   Eye:  Pupils equal and reactive to light and accomodation. Extraocular Movements Are Intact.   ENT: Mucus membranes are moist.   Cardiovascular:  Regular Rate And Rhythm     Respiratory:  Clear to auscultation bilaterally    Gastrointestinal:  Soft, Non Distended, Non Tenderness, Normal Bowel Sounds.    Neurological:    Normal Motor Observed, Normal Speech Observed.  Last known well unknown.  Patient unable to give history answers yes to everything.  Musculoskeletal:  No Gross Deformity Noted.  Ecchymosis and swelling to left lower extremity.  No pulses palpated.     Psychiatric:  Cooperative.      ED WORKUP FOR MEDICAL DECISION MAKING:    ED ORDERS:  Orders Placed This Encounter   Procedures    Blood culture #1 **CANNOT BE ORDERED STAT**    Blood culture #2 **CANNOT BE ORDERED STAT**    US Lower Extremity Arteries Left    X-Ray Tibia Fibula 2 View Left    X-Ray Ankle Complete Left    CT Head Without Contrast    X-Ray Chest 1 View    CBC auto differential    Comprehensive metabolic panel    COVID/RSV/FLU A&B PCR     Urinalysis, Reflex to Urine Culture    CBC with Differential    Urinalysis, Microscopic    Insert Saline lock IV    Admit to Inpatient       ED MEDICINES:  Medications   azithromycin (ZITHROMAX) 500 mg in dextrose 5 % (D5W) 250 mL IVPB (Vial-Mate) (has no administration in time range)   albuterol-ipratropium 2.5 mg-0.5 mg/3 mL nebulizer solution 3 mL (has no administration in time range)   sodium chloride 0.9% bolus 1,000 mL 1,000 mL (0 mLs Intravenous Stopped 12/26/23 1337)                ED LABS ORDERED AND REVIEWED:  Admission on 12/26/2023   Component Date Value Ref Range Status    Sodium Level 12/26/2023 129 (L)  132 - 146 mmol/L Final    Potassium Level 12/26/2023 4.4  3.5 - 5.1 mmol/L Final    Chloride 12/26/2023 99  98 - 111 mmol/L Final    Carbon Dioxide 12/26/2023 24  23 - 31 mmol/L Final    Glucose Level 12/26/2023 121  75 - 121 mg/dL Final    Blood Urea Nitrogen 12/26/2023 13.0  9.8 - 20.1 mg/dL Final    Creatinine 12/26/2023 0.67  0.55 - 1.02 mg/dL Final    Calcium Level Total 12/26/2023 8.7  8.4 - 10.2 mg/dL Final    Protein Total 12/26/2023 6.7  5.8 - 7.6 gm/dL Final    Albumin Level 12/26/2023 3.4  3.4 - 4.8 g/dL Final    Globulin 12/26/2023 3.3  2.4 - 3.5 gm/dL Final    Albumin/Globulin Ratio 12/26/2023 1.0 (L)  1.1 - 2.0 ratio Final    Bilirubin Total 12/26/2023 0.8  <=1.5 mg/dL Final    Alkaline Phosphatase 12/26/2023 85  40 - 150 unit/L Final    Alanine Aminotransferase 12/26/2023 39  0 - 55 unit/L Final    Aspartate Aminotransferase 12/26/2023 46 (H)  5 - 34 unit/L Final    eGFR 12/26/2023 >60  mls/min/1.73/m2 Final    Influenza A PCR 12/26/2023 Not Detected  Not Detected Final    Influenza B PCR 12/26/2023 Not Detected  Not Detected Final    Respiratory Syncytial Virus PCR 12/26/2023 Not Detected  Not Detected Final    SARS-CoV-2 PCR 12/26/2023 Not Detected  Not Detected, Negative Final    Color, UA 12/26/2023 Yellow  Yellow, Light-Yellow, Dark Yellow, Corina, Straw Final    Appearance, UA  12/26/2023 Cloudy (A)  Clear Final    Specific Gravity, UA 12/26/2023 1.015  1.005 - 1.030 Final    pH, UA 12/26/2023 8.5  5.0 - 8.5 Final    Protein, UA 12/26/2023 Negative  Negative Final    Glucose, UA 12/26/2023 Negative  Negative, Normal Final    Ketones, UA 12/26/2023 Negative  Negative Final    Blood, UA 12/26/2023 1+ (A)  Negative Final    Bilirubin, UA 12/26/2023 Negative  Negative Final    Urobilinogen, UA 12/26/2023 0.2  0.2, 1.0, Normal Final    Nitrites, UA 12/26/2023 Negative  Negative Final    Leukocyte Esterase, UA 12/26/2023 Negative  Negative Final    WBC 12/26/2023 10.37  4.50 - 11.50 x10(3)/mcL Final    RBC 12/26/2023 4.10 (L)  4.20 - 5.40 x10(6)/mcL Final    Hgb 12/26/2023 13.1  12.0 - 16.0 g/dL Final    Hct 12/26/2023 38.6  37.0 - 47.0 % Final    MCV 12/26/2023 94.1 (H)  80.0 - 94.0 fL Final    MCH 12/26/2023 32.0 (H)  27.0 - 31.0 pg Final    MCHC 12/26/2023 33.9  33.0 - 36.0 g/dL Final    RDW 12/26/2023 13.2  11.5 - 17.0 % Final    Platelet 12/26/2023 294  130 - 400 x10(3)/mcL Final    MPV 12/26/2023 9.2  7.4 - 10.4 fL Final    Neut % 12/26/2023 86.6  % Final    Lymph % 12/26/2023 6.0  % Final    Mono % 12/26/2023 6.6  % Final    Eos % 12/26/2023 0.0  % Final    Basophil % 12/26/2023 0.4  % Final    Lymph # 12/26/2023 0.62  0.6 - 4.6 x10(3)/mcL Final    Neut # 12/26/2023 8.99  2.1 - 9.2 x10(3)/mcL Final    Mono # 12/26/2023 0.68  0.1 - 1.3 x10(3)/mcL Final    Eos # 12/26/2023 0.00  0 - 0.9 x10(3)/mcL Final    Baso # 12/26/2023 0.04  <=0.2 x10(3)/mcL Final    IG# 12/26/2023 0.04  0 - 0.04 x10(3)/mcL Final    IG% 12/26/2023 0.4  % Final    Bacteria, UA 12/26/2023 Few (A)  None Seen, Rare, Occasional /HPF Final    Amorphous Phosphate Crystals, UA 12/26/2023 Many (A)  None Seen /HPF Final    RBC, UA 12/26/2023 None Seen  None Seen, 0-2, 3-5, 0-5 /HPF Final    WBC, UA 12/26/2023 None Seen  None Seen, 0-2, 3-5, 0-5 /HPF Final    Squamous Epithelial Cells, UA 12/26/2023 None Seen  None Seen, Rare,  Occasional, [facility] Final       RADIOLOGY STUDIES ORDERED AND REVIEWED:  Imaging Results              X-Ray Chest 1 View (In process)                      CT Head Without Contrast (Final result)  Result time 12/26/23 13:14:02      Final result by Phill Giles MD (12/26/23 13:14:02)                   Impression:      1. Nonacute nonhemorrhagic old/evolving infarct/encephalomalacia in the distribution of the right middle cerebral artery  2. Mild cerebral and cerebellar atrophy  3. Minimal intracranial atherosclerosis  4. Mild scattered hypodensity in periventricular white matter suspicious for small vessel ischemic changes      Electronically signed by: Phill Giles  Date:    12/26/2023  Time:    13:14               Narrative:    EXAMINATION:  CT HEAD WITHOUT CONTRAST    CLINICAL HISTORY:  Mental status change, unknown cause;, .    TECHNIQUE:  PATIENT RADIATION DOSE: DLP(mGycm) 871    As per PQRS measures, all CT scans at this facility used dose modulation, iterative reconstruction, and/or weight based dose adjustment when appropriate to reduce radiation dose to as low as reasonably achievable.    COMPARISON:  None available.    FINDINGS:  Serial axial images were obtained of the head without the administration of IV contrast. Both brain and bone parenchymal windows were obtained.  Additional coronal and sagittal reconstructions were obtained.  Ventricles, cisterns, and sulci are prominent in size.  There is no evidence of intracranial hemorrhage, midline shift, or abnormal extra-axial fluid collections.  There is low attenuation at the distribution of the right middle cerebral artery with sulcal effacement suspicious for evolving/old nonacute nonhemorrhagic infarct.  Minimal intracranial atherosclerosis is seen.  Mild scattered hypodensities are seen within the periventricular white matter.  Cerebellar tonsils extend caudally to the level of foramen magnum.  The sella turcica is mostly empty.  Visualized  paranasal sinuses and mastoid air cells are relatively clear.  External auditory canals are grossly patent.  Bony structures are osteopenic.                                       X-Ray Tibia Fibula 2 View Left (In process)  Result time 12/26/23 14:34:10                     X-Ray Ankle Complete Left (In process)                      US Lower Extremity Arteries Left (Final result)  Result time 12/26/23 13:10:38      Final result by Phill Giles MD (12/26/23 13:10:38)                   Impression:      1.  Mild scattered plaque formation at the arteries of the left lower extremity with no significant focal stenosis or localized atherosclerotic plaque formation identified      Electronically signed by: Phill Giles  Date:    12/26/2023  Time:    13:10               Narrative:    EXAMINATION:  LEFT LOWER EXTREMITY ARTERIAL DOPPLER SONOGRAM:    CLINICAL HISTORY:  Other disorder of circulatory system,    COMPARISON:  05/29/2023    FINDINGS:  Ultrasound Doppler and color flow imaging were performed on the arteries of the left lower extremity.  A multi phasic flow wave pattern is evident throughout the visualized arteries of the left lower extremity.  Mild scattered plaque formation is evident at the arteries of left lower extremity with no significant focal stenosis or localized atherosclerotic plaque formation identified.                                      MEDICAL DECISION MAKING:    Bhavani Galarza is 89 y.o. 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 arrives in ER with c/o Leg Pain (Patient has history of  jabier leg pain but last night the pain got worse. On tramadol and gabapentin.)      Reviewed Nurses Note. Reviewed Vital Signs.     Reviewed Pertinent old records, History and updated as necessary.    Vitals:    12/26/23 1050   BP: (!) 154/87   Pulse: 94   Resp: 18   Temp: 99.1 °F (37.3 °C)        Medical Decision Making  Amount and/or Complexity of  Data Reviewed  Labs: ordered.     Details: Urine bacteria, low-sodium  Radiology: ordered and independent interpretation performed. Decision-making details documented in ED Course.     Details: Reviewed radiology reports CT  Independent interpretation of tib-fib and ankle x-ray showed degenerative changes no fracture.  Chest x-ray shows early infiltrate right lower lobe.  Discussion of management or test interpretation with external provider(s): Discussed case with  who agrees to admit patient.  Requests antibiotics specifically Rocephin and neb treatments.    Risk  Prescription drug management.  Decision regarding hospitalization.              ED Course as of 12/26/23 1434   Tue Dec 26, 2023   1412 X-Ray Tibia Fibula 2 View Left [KK]      ED Course User Index  [KK] Kiko Savage MD            PROCEDURES PERFORMED IN ED:  Procedures    DIAGNOSTIC IMPRESSION:        ICD-10-CM ICD-9-CM   1. Pneumonia of right lower lobe due to infectious organism  J18.9 486   2. Trauma  T14.90XA 959.9   3. Shortness of breath  R06.02 786.05   4. Acute cystitis without hematuria  N30.00 595.0   5. Hypoxemia  R09.02 799.02   6. Transient alteration of awareness  R40.4 780.02         ED Disposition Condition    Admit Stable               Medication List        ASK your doctor about these medications      aspirin 81 MG EC tablet  Commonly known as: ECOTRIN     gabapentin 300 MG capsule  Commonly known as: Kiko Abbott MD  12/26/23 1433       Kiko Savage MD  12/26/23 1434

## 2023-12-27 PROBLEM — H53.452 OTHER LOCALIZED VISUAL FIELD DEFECT, LEFT EYE: Status: ACTIVE | Noted: 2023-12-27

## 2023-12-27 LAB
ALBUMIN SERPL-MCNC: 2.6 G/DL (ref 3.4–4.8)
ALBUMIN/GLOB SERPL: 0.9 RATIO (ref 1.1–2)
ALP SERPL-CCNC: 64 UNIT/L (ref 40–150)
ALT SERPL-CCNC: 26 UNIT/L (ref 0–55)
AMYLASE SERPL-CCNC: 47 UNIT/L (ref 20–160)
ANION GAP SERPL CALC-SCNC: 7 MEQ/L
AST SERPL-CCNC: 28 UNIT/L (ref 5–34)
AV PEAK GRADIENT: 32 MMHG
AV REGURGITATION PRESSURE HALF TIME: 274.7 MS
AV VELOCITY RATIO: 0.42
BASOPHILS # BLD AUTO: 0.03 X10(3)/MCL
BASOPHILS NFR BLD AUTO: 0.4 %
BILIRUB SERPL-MCNC: 0.4 MG/DL
BSA FOR ECHO PROCEDURE: 1.4 M2
BUN SERPL-MCNC: 13 MG/DL (ref 9.8–20.1)
BUN SERPL-MCNC: 16 MG/DL (ref 9.8–20.1)
CALCIUM SERPL-MCNC: 7.9 MG/DL (ref 8.4–10.2)
CALCIUM SERPL-MCNC: 8.2 MG/DL (ref 8.4–10.2)
CHLORIDE SERPL-SCNC: 101 MMOL/L (ref 98–111)
CHLORIDE SERPL-SCNC: 99 MMOL/L (ref 98–111)
CO2 SERPL-SCNC: 22 MMOL/L (ref 23–31)
CO2 SERPL-SCNC: 23 MMOL/L (ref 23–31)
CREAT SERPL-MCNC: 0.63 MG/DL (ref 0.55–1.02)
CREAT SERPL-MCNC: 0.76 MG/DL (ref 0.55–1.02)
CREAT/UREA NIT SERPL: 21
CV ECHO LV RWT: 0.63 CM
DOP CALC AO PEAK VEL: 2.85 M/S
DOP CALC LVOT PEAK VEL: 1.21 M/S
DOP CALC MV VTI: 40.2 CM
E WAVE DECELERATION TIME: 203.47 MSEC
E/A RATIO: 0.98
ECHO LV POSTERIOR WALL: 1.14 CM (ref 0.6–1.1)
EOSINOPHIL # BLD AUTO: 0.02 X10(3)/MCL (ref 0–0.9)
EOSINOPHIL NFR BLD AUTO: 0.3 %
ERYTHROCYTE [DISTWIDTH] IN BLOOD BY AUTOMATED COUNT: 13.2 % (ref 11.5–17)
FRACTIONAL SHORTENING: 29 % (ref 28–44)
GFR SERPLBLD CREATININE-BSD FMLA CKD-EPI: >60 MLS/MIN/1.73/M2
GFR SERPLBLD CREATININE-BSD FMLA CKD-EPI: >60 MLS/MIN/1.73/M2
GLOBULIN SER-MCNC: 2.9 GM/DL (ref 2.4–3.5)
GLUCOSE SERPL-MCNC: 106 MG/DL (ref 75–121)
GLUCOSE SERPL-MCNC: 133 MG/DL (ref 75–121)
HCT VFR BLD AUTO: 35.6 % (ref 37–47)
HGB BLD-MCNC: 12.1 G/DL (ref 12–16)
IMM GRANULOCYTES # BLD AUTO: 0.03 X10(3)/MCL (ref 0–0.04)
IMM GRANULOCYTES NFR BLD AUTO: 0.4 %
INTERVENTRICULAR SEPTUM: 1.17 CM (ref 0.6–1.1)
LEFT ATRIUM SIZE: 2.4 CM
LEFT INTERNAL DIMENSION IN SYSTOLE: 2.54 CM (ref 2.1–4)
LEFT VENTRICLE DIASTOLIC VOLUME INDEX: 38.6 ML/M2
LEFT VENTRICLE DIASTOLIC VOLUME: 54.42 ML
LEFT VENTRICLE MASS INDEX: 95 G/M2
LEFT VENTRICLE SYSTOLIC VOLUME INDEX: 16.4 ML/M2
LEFT VENTRICLE SYSTOLIC VOLUME: 23.17 ML
LEFT VENTRICULAR INTERNAL DIMENSION IN DIASTOLE: 3.6 CM (ref 3.5–6)
LEFT VENTRICULAR MASS: 133.53 G
LIPASE SERPL-CCNC: 14 U/L
LYMPHOCYTES # BLD AUTO: 0.9 X10(3)/MCL (ref 0.6–4.6)
LYMPHOCYTES NFR BLD AUTO: 13 %
MAGNESIUM SERPL-MCNC: 1.9 MG/DL (ref 1.6–2.6)
MCH RBC QN AUTO: 31.9 PG (ref 27–31)
MCHC RBC AUTO-ENTMCNC: 34 G/DL (ref 33–36)
MCV RBC AUTO: 93.9 FL (ref 80–94)
MONOCYTES # BLD AUTO: 0.72 X10(3)/MCL (ref 0.1–1.3)
MONOCYTES NFR BLD AUTO: 10.4 %
MV MEAN GRADIENT: 6 MMHG
MV PEAK A VEL: 1.64 M/S
MV PEAK E VEL: 1.61 M/S
MV PEAK GRADIENT: 10 MMHG
MV STENOSIS PRESSURE HALF TIME: 59.01 MS
MV VALVE AREA P 1/2 METHOD: 3.73 CM2
NEUTROPHILS # BLD AUTO: 5.23 X10(3)/MCL (ref 2.1–9.2)
NEUTROPHILS NFR BLD AUTO: 75.5 %
OSMOLALITY SERPL: 274 MOSM/KG (ref 280–300)
PISA AR MAX VEL: 4.71 M/S
PISA MRMAX VEL: 4.67 M/S
PISA TR MAX VEL: 2.69 M/S
PLATELET # BLD AUTO: 266 X10(3)/MCL (ref 130–400)
PMV BLD AUTO: 8.9 FL (ref 7.4–10.4)
POTASSIUM SERPL-SCNC: 3.6 MMOL/L (ref 3.5–5.1)
POTASSIUM SERPL-SCNC: 3.9 MMOL/L (ref 3.5–5.1)
PROT SERPL-MCNC: 5.5 GM/DL (ref 5.8–7.6)
PV PEAK GRADIENT: 5 MMHG
PV PEAK VELOCITY: 1.1 M/S
RBC # BLD AUTO: 3.79 X10(6)/MCL (ref 4.2–5.4)
RIGHT VENTRICULAR END-DIASTOLIC DIMENSION: 1.92 CM
SODIUM SERPL-SCNC: 129 MMOL/L (ref 132–146)
SODIUM SERPL-SCNC: 130 MMOL/L (ref 132–146)
TR MAX PG: 29 MMHG
TSH SERPL-ACNC: 0.71 UIU/ML (ref 0.35–4.94)
WBC # SPEC AUTO: 6.93 X10(3)/MCL (ref 4.5–11.5)
Z-SCORE OF LEFT VENTRICULAR DIMENSION IN END DIASTOLE: -1.9
Z-SCORE OF LEFT VENTRICULAR DIMENSION IN END SYSTOLE: -0.49

## 2023-12-27 PROCEDURE — 92610 EVALUATE SWALLOWING FUNCTION: CPT

## 2023-12-27 PROCEDURE — 84295 ASSAY OF SERUM SODIUM: CPT | Performed by: INTERNAL MEDICINE

## 2023-12-27 PROCEDURE — 93005 ELECTROCARDIOGRAM TRACING: CPT

## 2023-12-27 PROCEDURE — 25000242 PHARM REV CODE 250 ALT 637 W/ HCPCS: Performed by: INTERNAL MEDICINE

## 2023-12-27 PROCEDURE — 97116 GAIT TRAINING THERAPY: CPT

## 2023-12-27 PROCEDURE — 25000003 PHARM REV CODE 250: Performed by: INTERNAL MEDICINE

## 2023-12-27 PROCEDURE — 21400001 HC TELEMETRY ROOM

## 2023-12-27 PROCEDURE — 83930 ASSAY OF BLOOD OSMOLALITY: CPT | Performed by: INTERNAL MEDICINE

## 2023-12-27 PROCEDURE — 94761 N-INVAS EAR/PLS OXIMETRY MLT: CPT

## 2023-12-27 PROCEDURE — 85025 COMPLETE CBC W/AUTO DIFF WBC: CPT | Performed by: INTERNAL MEDICINE

## 2023-12-27 PROCEDURE — 82533 TOTAL CORTISOL: CPT | Performed by: INTERNAL MEDICINE

## 2023-12-27 PROCEDURE — 94640 AIRWAY INHALATION TREATMENT: CPT

## 2023-12-27 PROCEDURE — 27000221 HC OXYGEN, UP TO 24 HOURS

## 2023-12-27 PROCEDURE — 93010 ELECTROCARDIOGRAM REPORT: CPT | Mod: ,,, | Performed by: INTERNAL MEDICINE

## 2023-12-27 PROCEDURE — 81005 URINALYSIS: CPT | Performed by: INTERNAL MEDICINE

## 2023-12-27 PROCEDURE — 80053 COMPREHEN METABOLIC PANEL: CPT | Performed by: INTERNAL MEDICINE

## 2023-12-27 PROCEDURE — 80048 BASIC METABOLIC PNL TOTAL CA: CPT | Performed by: INTERNAL MEDICINE

## 2023-12-27 PROCEDURE — 63600175 PHARM REV CODE 636 W HCPCS: Performed by: INTERNAL MEDICINE

## 2023-12-27 PROCEDURE — 97161 PT EVAL LOW COMPLEX 20 MIN: CPT

## 2023-12-27 RX ORDER — 3% SODIUM CHLORIDE 3 G/100ML
22 INJECTION, SOLUTION INTRAVENOUS CONTINUOUS
Status: DISPENSED | OUTPATIENT
Start: 2023-12-27 | End: 2023-12-29

## 2023-12-27 RX ORDER — ATORVASTATIN CALCIUM 40 MG/1
40 TABLET, FILM COATED ORAL DAILY
Status: DISCONTINUED | OUTPATIENT
Start: 2023-12-27 | End: 2024-01-03 | Stop reason: HOSPADM

## 2023-12-27 RX ORDER — CLOPIDOGREL BISULFATE 75 MG/1
75 TABLET ORAL DAILY
Status: DISCONTINUED | OUTPATIENT
Start: 2023-12-27 | End: 2024-01-03 | Stop reason: HOSPADM

## 2023-12-27 RX ADMIN — ENOXAPARIN SODIUM 40 MG: 40 INJECTION SUBCUTANEOUS at 05:12

## 2023-12-27 RX ADMIN — POLYETHYLENE GLYCOL 3350 17 G: 17 POWDER, FOR SOLUTION ORAL at 09:12

## 2023-12-27 RX ADMIN — CLOPIDOGREL BISULFATE 75 MG: 75 TABLET ORAL at 09:12

## 2023-12-27 RX ADMIN — IPRATROPIUM BROMIDE AND ALBUTEROL SULFATE 3 ML: 2.5; .5 SOLUTION RESPIRATORY (INHALATION) at 07:12

## 2023-12-27 RX ADMIN — ACETAMINOPHEN 650 MG: 325 TABLET, FILM COATED ORAL at 04:12

## 2023-12-27 RX ADMIN — CEFTRIAXONE SODIUM 1 G: 1 INJECTION, POWDER, FOR SOLUTION INTRAMUSCULAR; INTRAVENOUS at 03:12

## 2023-12-27 RX ADMIN — IPRATROPIUM BROMIDE AND ALBUTEROL SULFATE 3 ML: 2.5; .5 SOLUTION RESPIRATORY (INHALATION) at 01:12

## 2023-12-27 RX ADMIN — SODIUM CHLORIDE: 9 INJECTION, SOLUTION INTRAVENOUS at 11:12

## 2023-12-27 RX ADMIN — ATORVASTATIN CALCIUM 40 MG: 40 TABLET, FILM COATED ORAL at 09:12

## 2023-12-27 RX ADMIN — SODIUM CHLORIDE 22 ML/HR: 3 INJECTION, SOLUTION INTRAVENOUS at 09:12

## 2023-12-27 RX ADMIN — AZITHROMYCIN MONOHYDRATE 500 MG: 500 INJECTION, POWDER, LYOPHILIZED, FOR SOLUTION INTRAVENOUS at 05:12

## 2023-12-27 NOTE — PLAN OF CARE
Problem: Adult Inpatient Plan of Care  Goal: Plan of Care Review  Outcome: Ongoing, Progressing  Goal: Patient-Specific Goal (Individualized)  Outcome: Ongoing, Progressing  Goal: Absence of Hospital-Acquired Illness or Injury  Outcome: Ongoing, Progressing  Goal: Optimal Comfort and Wellbeing  Outcome: Ongoing, Progressing  Goal: Readiness for Transition of Care  Outcome: Ongoing, Progressing     Problem: Skin Injury Risk Increased  Goal: Skin Health and Integrity  Outcome: Ongoing, Progressing     Problem: Fluid Imbalance (Pneumonia)  Goal: Fluid Balance  Outcome: Ongoing, Progressing

## 2023-12-27 NOTE — ASSESSMENT & PLAN NOTE
Patient with supplemental oxygen.  May make some assumption that this point she may have a pneumonia.  Will continue to follow up with possible serial chest x-rays.  She could have some dehydration with a pneumonia that has yet to present on chest x-ray.  Lungs were clear on exam.  Continue antibiotics.  Make sure we have some breathing treatments.

## 2023-12-27 NOTE — CONSULTS
[facility] - Medical Surgical Unit  Cardiology  Consult Note    Patient Name: Bhavani Galarza  MRN: [id number]  Admission Date: 12/26/2023  Hospital Length of Stay: 1 days  Code Status: Full Code   Attending Provider: Tomer Samaniego III, *   Consulting Provider: ARTURO Jasso  Primary Care Physician: Tomer Samaniego III, MD  Principal Problem:Encephalopathy, metabolic    Patient information was obtained from patient, caregiver / friend, and primary team.     Inpatient consult to Cardiology  Consult performed by: Kedar Wei FNP  Consult ordered by: Tomer Samaniego III, MD  Reason for consult: CVA        Subjective:     Chief Complaint:  Weakness     HPI: Patient is a 89 yo female known to [facility]. She was brought to ED with AMS and weakness. CT of the head showed non-acute CVA. [facility] consulted due to CVA. This morning she is resting in bed without distress. She denies any CP or SOB.     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        Past Surgical History:   Procedure Laterality Date    BACK SURGERY      COLONOSCOPY      X 2    EPIDURAL STEROID INJECTION INTO LUMBAR SPINE Right 10/20/2023    Procedure: INJECTION, STEROID, SPINE, LUMBAR, EPIDURAL (Right L5- S1 ILESI);  Surgeon: Amrit Song MD;  Location: [facility];  Service: Pain Management;  Laterality: Right;    TONSILLECTOMY      TRANSFORAMINAL EPIDURAL INJECTION OF STEROID Right 09/22/2023    Procedure: INJECTION, STEROID, EPIDURAL, TRANSFORAMINAL APPROACH (Right TFESI L4 & L5);  Surgeon: Amrit Song MD;  Location: [facility];  Service: Pain Management;  Laterality: Right;       Review of patient's allergies indicates:  No Known Allergies    Current Facility-Administered Medications on File Prior to Encounter   Medication    lactated ringers infusion     Current Outpatient Medications on File Prior to Encounter   Medication Sig    aspirin (ECOTRIN) 81 MG EC tablet Take  81 mg by mouth every evening. Stopped 9/21/23    gabapentin (NEURONTIN) 300 MG capsule Take 300 mg by mouth 2 (two) times daily.    traMADoL (ULTRAM) 50 mg tablet Take 50 mg by mouth every 6 (six) hours as needed.     Family History       Problem Relation (Age of Onset)    Heart attack Father    No Known Problems Mother          Tobacco Use    Smoking status: Never    Smokeless tobacco: Never   Substance and Sexual Activity    Alcohol use: Not Currently    Drug use: Never    Sexual activity: Not Currently     Review of Systems   Constitutional: Negative.   Cardiovascular: Negative.    Respiratory: Negative.     Musculoskeletal:  Positive for muscle weakness.     Objective:     Vital Signs (Most Recent):  Temp: 98.2 °F (36.8 °C) (12/27/23 0800)  Pulse: 75 (12/27/23 0800)  Resp: 20 (12/27/23 0718)  BP: 124/72 (12/27/23 0800)  SpO2: (!) 93 % (12/27/23 0800) Vital Signs (24h Range):  Temp:  [98.1 °F (36.7 °C)-98.3 °F (36.8 °C)] 98.2 °F (36.8 °C)  Pulse:  [72-94] 75  Resp:  [16-22] 20  SpO2:  [90 %-97 %] 93 %  BP: (120-153)/(64-92) 124/72     Weight: 45.4 kg (100 lb)  Body mass index is 18.89 kg/m².    SpO2: (!) 93 %         Intake/Output Summary (Last 24 hours) at 12/27/2023 1106  Last data filed at 12/27/2023 0417  Gross per 24 hour   Intake --   Output 75 ml   Net -75 ml       Lines/Drains/Airways       Peripheral Intravenous Line  Duration                  Peripheral IV - Single Lumen 12/27/23 1009 20 G Posterior;Right Forearm <1 day                    Physical Exam  Constitutional:       General: She is not in acute distress.     Appearance: She is not ill-appearing.   Eyes:      Extraocular Movements: Extraocular movements intact.   Cardiovascular:      Rate and Rhythm: Normal rate and regular rhythm.      Heart sounds: No murmur heard.  Pulmonary:      Effort: Pulmonary effort is normal. No respiratory distress.      Breath sounds: Normal breath sounds.   Musculoskeletal:         General: No swelling.       Comments: Left sided weakness   Skin:     General: Skin is warm and dry.   Neurological:      Mental Status: She is alert and oriented to person, place, and time.   Psychiatric:         Mood and Affect: Mood normal.         Behavior: Behavior normal.         Significant Labs: CMP   Recent Labs   Lab 12/26/23  1248 12/27/23  0756   * 130*   K 4.4 3.6   CO2 24 22*   BUN 13.0 13.0   CREATININE 0.67 0.63   CALCIUM 8.7 8.2*   ALBUMIN 3.4  --    BILITOT 0.8  --    ALKPHOS 85  --    AST 46*  --    ALT 39  --     and CBC   Recent Labs   Lab 12/26/23  1248 12/27/23  0756   WBC 10.37 6.93   HGB 13.1 12.1   HCT 38.6 35.6*    266       Significant Imaging:   Echocardiogram 8/23:   - EF 55%  - Mild AS (MG 8.8), m/m AI  Assessment and Plan:     CVA with left sided weakness  Continue aspirin and statin  Echo and CUS ordered for today  Monitor telemetry   Continue with physical therapy  Hyponatremia  Improving  PAD, minimal      Thank you for your consult.     Kedar Wei, ARTURO  Cardiology   [facility] - [facility] Surgical Unit

## 2023-12-27 NOTE — PROGRESS NOTES
"Inpatient Nutrition Evaluation    Admit Date: 12/26/2023   Total duration of encounter: 1 day    Nutrition Recommendation/Prescription     Continue Soft & Bite Sized Diet per SLP recommendations.   Monitor need for ONS.   Monitor intake, tolerance, weight, and labs.       RD following and available as needed. Thank you.      Nutrition Assessment     Chart Review    Reason Seen: continuous nutrition monitoring    Malnutrition Screening Tool Results   Have you recently lost weight without trying?: No  Have you been eating poorly because of a decreased appetite?: No   MST Score: 0     Diagnosis:  Encephalopathy, Metabolic.     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    Nutrition-Related Medications:   IV Fluids: 0.9% NaCl @ 75mL/hr.   Lovenox; Zithromax.       Nutrition-Related Labs:  12/27: Hct 35.6(L); Na+ 130(L); CO2 22(L); Ca+ 8.2(L).    Diet Order: Diet Soft & Bite Sized (IDDSI Level 6)  Oral Supplement Order: none  Appetite/Oral Intake: good/not applicable Diet just advanced. No intake yet.   Factors Affecting Nutritional Intake: none identified  Food/Restorationist/Cultural Preferences: none reported  Food Allergies: none reported       Wound(s):       Comments    12/27: Pt reports good diet and requested for diet upgrade. Pt reported being on regular diet prior to admit. SLP evaluated and upgraded diet today to Soft & Bite Sized. No intake yet. No recent weight loss noted/reported on nursing admit screen. Labs and meds reviewed. Na+ low, MD addressing. Will continue to monitor during stay.     Anthropometrics    Height: 5' 1" (154.9 cm) Height Method: Stated  Last Weight: 45.4 kg (100 lb) (12/26/23 1719) Weight Method: Bed Scale  BMI (Calculated): 18.9  BMI Classification: underweight (BMI less than 22 if >65 years of age)     Ideal Body Weight (IBW), Female: 105 lb     % Ideal Body Weight, Female (lb): 95.24 %                             Usual Weight Provided By: " EMR weight history    Wt Readings from Last 5 Encounters:   12/26/23 45.4 kg (100 lb)   10/18/23 40.8 kg (90 lb)   09/19/23 43.1 kg (95 lb)   02/24/20 45.4 kg (100 lb 1.4 oz)     Weight Change(s) Since Admission:  Admit Weight: 45.4 kg (100 lb) (12/26/23 1050)      Patient Education    Not applicable.    Monitoring & Evaluation     Dietitian will monitor food and beverage intake, energy intake, weight, weight change, electrolyte/renal panel, glucose/endocrine profile, and gastrointestinal profile.  Nutrition Risk/Follow-Up: low (follow-up in 5-7 days)  Patients assigned 'low nutrition risk' status do not qualify for a full nutritional assessment but will be monitored and re-evaluated in a 5-7 day time period. Please consult if re-evaluation needed sooner.

## 2023-12-27 NOTE — HPI
This is a 89-year-old very pleasant intelligent articulate white female who presented to the emergency room in [place] today.  Upon arriving the patient was hypoxemic and showed signs of confusion.  The patient had some O2 sats that were in low enough arrange for 2 L to maintain.  Patient had a head CT which was negative for any acute infarctions.      Further peripheral discussion later had this evening from friend of the family Mr. Pickett.  Indicated that the patient had a [employer] dinner yesterday and was doing quite well until he jokes and laughing and then shortly after the dinner the patient became much more confused and was not doing well.  He said that she stated she did not want to go to [employer].  He came back over to check on her but she would already hit the ambulance monitor that she wears around her neck.    The only invasive issue she has had in the last 2-3 months surgically speaking was an epidural steroid injection by Dr. Amrit Song in October.    She received IV fluids and some empiric antibiotics in the ER per my discussion with the emergency room doctor.  We are admitting her for workup of her hepatic encephalopathy, treatment for her hyponatremia and further workup of her reason for current condition she presented in.

## 2023-12-27 NOTE — H&P
[facility] Medicine  History & Physical    Patient Name: Bhavani Galarza  MRN: [id number]  Patient Class: IP- Inpatient  Admission Date: 12/26/2023  Attending Physician: Tomer Samaniego III, *   Primary Care Provider: Tomer aSmaniego III, MD         Patient information was obtained from ER records and friend Mr. Pickett .     Subjective:     Principal Problem:Encephalopathy, metabolic    Chief Complaint:   Chief Complaint   Patient presents with    Leg Pain     Patient has history of  jabier leg pain but last night the pain got worse. On tramadol and gabapentin.        HPI: This is a 89-year-old very pleasant intelligent articulate white female who presented to the emergency room in [place] today.  Upon arriving the patient was hypoxemic and showed signs of confusion.  The patient had some O2 sats that were in low enough arrange for 2 L to maintain.  Patient had a head CT which was negative for any acute infarctions.      Further peripheral discussion later had this evening from friend of the family Mr. Pickett.  Indicated that the patient had a [place] dinner yesterday and was doing quite well until he jokes and laughing and then shortly after the dinner the patient became much more confused and was not doing well.  He said that she stated she did not want to go to [employer].  He came back over to check on her but she would already hit the ambulance monitor that she wears around her neck.    The only invasive issue she has had in the last 2-3 months surgically speaking was an epidural steroid injection by Dr. Amrit Song in October.    She received IV fluids and some empiric antibiotics in the ER per my discussion with the emergency room doctor.  We are admitting her for workup of her hepatic encephalopathy, treatment for her hyponatremia and further workup of her reason for current condition she presented in.        Past Medical History:   Diagnosis Date    Spinal  stenosis, lumbar region without neurogenic claudication        Past Surgical History:   Procedure Laterality Date    BACK SURGERY      COLONOSCOPY      X 2    EPIDURAL STEROID INJECTION INTO LUMBAR SPINE Right 10/20/2023    Procedure: INJECTION, STEROID, SPINE, LUMBAR, EPIDURAL (Right L5- S1 ILESI);  Surgeon: Amrit Song MD;  Location: [facility] OR;  Service: Pain Management;  Laterality: Right;    TONSILLECTOMY      TRANSFORAMINAL EPIDURAL INJECTION OF STEROID Right 09/22/2023    Procedure: INJECTION, STEROID, EPIDURAL, TRANSFORAMINAL APPROACH (Right TFESI L4 & L5);  Surgeon: Amrit Song MD;  Location: [facility] OR;  Service: Pain Management;  Laterality: Right;       Review of patient's allergies indicates:  No Known Allergies    Current Facility-Administered Medications on File Prior to Encounter   Medication    lactated ringers infusion     Current Outpatient Medications on File Prior to Encounter   Medication Sig    aspirin (ECOTRIN) 81 MG EC tablet Take 81 mg by mouth every evening. Stopped 9/21/23    gabapentin (NEURONTIN) 300 MG capsule Take 300 mg by mouth 2 (two) times daily.    traMADoL (ULTRAM) 50 mg tablet Take 50 mg by mouth every 6 (six) hours as needed.     Family History       Problem Relation (Age of Onset)    Heart attack Father    No Known Problems Mother          Tobacco Use    Smoking status: Never    Smokeless tobacco: Never   Substance and Sexual Activity    Alcohol use: Not Currently    Drug use: Never    Sexual activity: Not Currently     Review of Systems   Constitutional:  Positive for activity change, appetite change and fatigue.   Respiratory:  Positive for shortness of breath.    Cardiovascular:  Positive for leg swelling.   Neurological:  Positive for speech difficulty and weakness.   Psychiatric/Behavioral:  Positive for confusion, decreased concentration and sleep disturbance.      Objective:     Vital Signs (Most Recent):  Temp: 98.2 °F (36.8 °C) (12/26/23 1719)  Pulse: 94 (12/26/23  "1719)  Resp: 16 (12/26/23 1719)  BP: 132/83 (12/26/23 1719)  SpO2: (!) 92 % (12/26/23 1719) Vital Signs (24h Range):  Temp:  [98.2 °F (36.8 °C)-99.1 °F (37.3 °C)] 98.2 °F (36.8 °C)  Pulse:  [86-94] 94  Resp:  [16-22] 16  SpO2:  [90 %-99 %] 92 %  BP: (132-154)/(83-92) 132/83     Weight: 45.4 kg (100 lb)  Body mass index is 18.89 kg/m².     Physical Exam    On physical exam I am with the patient's friend Mr. TERESO Pickett.  He is at the bedside.  The patient physically denied any pain at the time.  He related the story in the history.  Cranial nerves 2-12 can not be assessed fully.  She was arousable but it took a while to orient her.  She did seem to have a blank stare on her face and could not fully track my face.  She did not know her date of birth but did not know where she was.  But she did recognize Mr. Anderson.  She had no JVD.  No lymphadenopathy.  Her cardiac exam was consistent with a grade 2 systolic murmur of the left precordium.  She is scaphoid abdomen no tenderness.  No suprapubic tenderness.  She does have some ecchymotic area around the left foot up into the superior margin of the tibia at the tibial tubercle.    Reflexes were difficult to assess she was flaccid.  She did not have any increasing muscle tension no decorticate or decerebrate posturing          Significant Labs: A1C: No results for input(s): "HGBA1C" in the last 4320 hours.  ABGs: No results for input(s): "PH", "PCO2", "HCO3", "POCSATURATED", "BE", "TOTALHB", "COHB", "METHB", "O2HB", "POCFIO2", "PO2" in the last 48 hours.  Blood Culture: No results for input(s): "LABBLOO" in the last 48 hours.  BMP:   Recent Labs   Lab 12/26/23  1248   *   K 4.4   CO2 24   BUN 13.0   CREATININE 0.67   CALCIUM 8.7     CBC:   Recent Labs   Lab 12/26/23  1248   WBC 10.37   HGB 13.1   HCT 38.6        CMP:   Recent Labs   Lab 12/26/23  1248   *   K 4.4   CO2 24   BUN 13.0   CREATININE 0.67   CALCIUM 8.7   ALBUMIN 3.4   BILITOT 0.8   ALKPHOS 85 " "  AST 46*   ALT 39     Cardiac Markers: No results for input(s): "CKMB", "MYOGLOBIN", "BNP", "TROPISTAT" in the last 48 hours.  Lactic Acid: No results for input(s): "LACTATE" in the last 48 hours.  Urine Culture: No results for input(s): "LABURIN" in the last 48 hours.  Urine Studies:   Recent Labs   Lab 12/26/23  1303   APPEARANCEUA Cloudy*   PROTEINUA Negative   BILIRUBINUA Negative   UROBILINOGEN 0.2   LEUKOCYTESUR Negative   RBCUA None Seen   WBCUA None Seen   BACTERIA Few*     Recent Lab Results         12/26/23  1303   12/26/23  1248   12/26/23  1234        RSV Ag by Molecular Method     Not Detected       Influenza A, Molecular     Not Detected       Influenza B, Molecular     Not Detected       Albumin/Globulin Ratio   1.0         Albumin   3.4         ALP   85         ALT   39         Amorphous Phosphate Crystals, UA Many           Appearance, UA Cloudy           AST   46         Bacteria, UA Few           Baso #   0.04         Basophil %   0.4         BILIRUBIN TOTAL   0.8         Bilirubin, UA Negative           BUN   13.0         Calcium   8.7         Chloride   99         CO2   24         Color, UA Yellow           Creatinine   0.67         eGFR   >60         Eos #   0.00         Eosinophil %   0.0         Globulin, Total   3.3         Glucose   121         Glucose, UA Negative           Hematocrit   38.6         Hemoglobin   13.1         Immature Grans (Abs)   0.04         Immature Granulocytes   0.4         Ketones, UA Negative           Leukocytes, UA Negative           Lymph #   0.62         LYMPH %   6.0         MCH   32.0         MCHC   33.9         MCV   94.1         Mono #   0.68         Mono %   6.6         MPV   9.2         Neut #   8.99         Neut %   86.6         NITRITE UA Negative           Occult Blood UA 1+           pH, UA 8.5           Platelet Count   294         Potassium   4.4         PROTEIN TOTAL   6.7         Protein, UA Negative           RBC   4.10         RBC, UA None " Seen           RDW   13.2         SARS-CoV2 (COVID-19) Qualitative PCR     Not Detected       Sodium   129         Specific Gravity,UA 1.015           Squam Epithel, UA None Seen           Urobilinogen, UA 0.2           WBC, UA None Seen           WBC   10.37                 Significant Imaging: I have reviewed all pertinent imaging results/findings within the past 24 hours.  Assessment/Plan:     * Encephalopathy, metabolic  Differential here is broad.  Consider subacute thalamic CVA, infectious etiologies likely less for meningitis.  Also consider toxidrome.  Patient has been recently on tramadol and Neurontin which are relatively new medications for her.  She has been in excellent health until she started having some lumbar radiculopathy for which she underwent injections this past October.  Perhaps she was taking too much Neurontin which could contribute to hyponatremia.    Additionally, we have to consider vascular, infection, toxidrome, and of course the most likely hyponatremia due to electrolyte abnormalities with decreased consciousness.  Etiology for the hyponatremia still to be determined.      Bruised sole of foot, left, initial encounter  Unclear etiology.  The foot bruise.  It looks rather chronic.  Make sure that there has no fracture there.  Will x-ray foot and ankle in the morning      Hyponatremia  Patient has hyponatremia which is uncontrolled,We will aim to correct the sodium by 4-6mEq in 24 hours. We will monitor sodium Daily. The hyponatremia is due to yet to be determined.  Will get some sodium repeat labs in the morning.  I will also get some labs for adrenal insufficiency.. We will obtain the following studies: Urine sodium, urine osmolality, serum osmolality, AM cortisol, or TSH, T4. We will treat the hyponatremia with IV fluids as follows:  Will continue IV fluids normal saline. The patient's sodium results have been reviewed and are listed below.  Recent Labs   Lab 12/26/23  1248   *        Hypoxemia    Patient with supplemental oxygen.  May make some assumption that this point she may have a pneumonia.  Will continue to follow up with possible serial chest x-rays.  She could have some dehydration with a pneumonia that has yet to present on chest x-ray.  Lungs were clear on exam.  Continue antibiotics.  Make sure we have some breathing treatments.    Transient alteration of awareness  See encephalopathy below.        VTE Risk Mitigation (From admission, onward)           Ordered     IP VTE HIGH RISK PATIENT  Once         12/26/23 1626     Place sequential compression device  Until discontinued         12/26/23 1626                       DVT prophylaxis will be initiated with enoxaparin             Tomer Samaniego III, MD  Department of Hospital Medicine  [facility]

## 2023-12-27 NOTE — SUBJECTIVE & OBJECTIVE
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        Past Surgical History:   Procedure Laterality Date    BACK SURGERY      COLONOSCOPY      X 2    EPIDURAL STEROID INJECTION INTO LUMBAR SPINE Right 10/20/2023    Procedure: INJECTION, STEROID, SPINE, LUMBAR, EPIDURAL (Right L5- S1 ILESI);  Surgeon: Amrit Song MD;  Location: [facility];  Service: Pain Management;  Laterality: Right;    TONSILLECTOMY      TRANSFORAMINAL EPIDURAL INJECTION OF STEROID Right 09/22/2023    Procedure: INJECTION, STEROID, EPIDURAL, TRANSFORAMINAL APPROACH (Right TFESI L4 & L5);  Surgeon: Amrit Song MD;  Location: [facility];  Service: Pain Management;  Laterality: Right;       Review of patient's allergies indicates:  No Known Allergies    Current Facility-Administered Medications on File Prior to Encounter   Medication    lactated ringers infusion     Current Outpatient Medications on File Prior to Encounter   Medication Sig    aspirin (ECOTRIN) 81 MG EC tablet Take 81 mg by mouth every evening. Stopped 9/21/23    gabapentin (NEURONTIN) 300 MG capsule Take 300 mg by mouth 2 (two) times daily.    traMADoL (ULTRAM) 50 mg tablet Take 50 mg by mouth every 6 (six) hours as needed.     Family History       Problem Relation (Age of Onset)    Heart attack Father    No Known Problems Mother          Tobacco Use    Smoking status: Never    Smokeless tobacco: Never   Substance and Sexual Activity    Alcohol use: Not Currently    Drug use: Never    Sexual activity: Not Currently     Review of Systems   Constitutional:  Positive for activity change, appetite change and fatigue.   Respiratory:  Positive for shortness of breath.    Cardiovascular:  Positive for leg swelling.   Neurological:  Positive for speech difficulty and weakness.   Psychiatric/Behavioral:  Positive for confusion, decreased concentration and sleep disturbance.      Objective:     Vital Signs (Most Recent):  Temp: 98.2 °F  "(36.8 °C) (12/26/23 1719)  Pulse: 94 (12/26/23 1719)  Resp: 16 (12/26/23 1719)  BP: 132/83 (12/26/23 1719)  SpO2: (!) 92 % (12/26/23 1719) Vital Signs (24h Range):  Temp:  [98.2 °F (36.8 °C)-99.1 °F (37.3 °C)] 98.2 °F (36.8 °C)  Pulse:  [86-94] 94  Resp:  [16-22] 16  SpO2:  [90 %-99 %] 92 %  BP: (132-154)/(83-92) 132/83     Weight: 45.4 kg (100 lb)  Body mass index is 18.89 kg/m².     Physical Exam    On physical exam I am with the patient's friend Mr. TERESO Pickett.  He is at the bedside.  The patient physically denied any pain at the time.  He related the story in the history.  Cranial nerves 2-12 can not be assessed fully.  She was arousable but it took a while to orient her.  She did seem to have a blank stare on her face and could not fully track my face.  She did not know her date of birth but did not know where she was.  But she did recognize Mr. Anderson.  She had no JVD.  No lymphadenopathy.  Her cardiac exam was consistent with a grade 2 systolic murmur of the left precordium.  She is scaphoid abdomen no tenderness.  No suprapubic tenderness.  She does have some ecchymotic area around the left foot up into the superior margin of the tibia at the tibial tubercle.    Reflexes were difficult to assess she was flaccid.  She did not have any increasing muscle tension no decorticate or decerebrate posturing          Significant Labs: A1C: No results for input(s): "HGBA1C" in the last 4320 hours.  ABGs: No results for input(s): "PH", "PCO2", "HCO3", "POCSATURATED", "BE", "TOTALHB", "COHB", "METHB", "O2HB", "POCFIO2", "PO2" in the last 48 hours.  Blood Culture: No results for input(s): "LABBLOO" in the last 48 hours.  BMP:   Recent Labs   Lab 12/26/23  1248   *   K 4.4   CO2 24   BUN 13.0   CREATININE 0.67   CALCIUM 8.7     CBC:   Recent Labs   Lab 12/26/23  1248   WBC 10.37   HGB 13.1   HCT 38.6        CMP:   Recent Labs   Lab 12/26/23  1248   *   K 4.4   CO2 24   BUN 13.0   CREATININE 0.67   CALCIUM " "8.7   ALBUMIN 3.4   BILITOT 0.8   ALKPHOS 85   AST 46*   ALT 39     Cardiac Markers: No results for input(s): "CKMB", "MYOGLOBIN", "BNP", "TROPISTAT" in the last 48 hours.  Lactic Acid: No results for input(s): "LACTATE" in the last 48 hours.  Urine Culture: No results for input(s): "LABURIN" in the last 48 hours.  Urine Studies:   Recent Labs   Lab 12/26/23  1303   APPEARANCEUA Cloudy*   PROTEINUA Negative   BILIRUBINUA Negative   UROBILINOGEN 0.2   LEUKOCYTESUR Negative   RBCUA None Seen   WBCUA None Seen   BACTERIA Few*     Recent Lab Results         12/26/23  1303   12/26/23  1248   12/26/23  1234        RSV Ag by Molecular Method     Not Detected       Influenza A, Molecular     Not Detected       Influenza B, Molecular     Not Detected       Albumin/Globulin Ratio   1.0         Albumin   3.4         ALP   85         ALT   39         Amorphous Phosphate Crystals, UA Many           Appearance, UA Cloudy           AST   46         Bacteria, UA Few           Baso #   0.04         Basophil %   0.4         BILIRUBIN TOTAL   0.8         Bilirubin, UA Negative           BUN   13.0         Calcium   8.7         Chloride   99         CO2   24         Color, UA Yellow           Creatinine   0.67         eGFR   >60         Eos #   0.00         Eosinophil %   0.0         Globulin, Total   3.3         Glucose   121         Glucose, UA Negative           Hematocrit   38.6         Hemoglobin   13.1         Immature Grans (Abs)   0.04         Immature Granulocytes   0.4         Ketones, UA Negative           Leukocytes, UA Negative           Lymph #   0.62         LYMPH %   6.0         MCH   32.0         MCHC   33.9         MCV   94.1         Mono #   0.68         Mono %   6.6         MPV   9.2         Neut #   8.99         Neut %   86.6         NITRITE UA Negative           Occult Blood UA 1+           pH, UA 8.5           Platelet Count   294         Potassium   4.4         PROTEIN TOTAL   6.7         Protein, UA Negative    "        RBC   4.10         RBC, UA None Seen           RDW   13.2         SARS-CoV2 (COVID-19) Qualitative PCR     Not Detected       Sodium   129         Specific Gravity,UA 1.015           Squam Epithel, UA None Seen           Urobilinogen, UA 0.2           WBC, UA None Seen           WBC   10.37                 Significant Imaging: I have reviewed all pertinent imaging results/findings within the past 24 hours.

## 2023-12-27 NOTE — ASSESSMENT & PLAN NOTE
Differential here is broad.  Consider subacute thalamic CVA, infectious etiologies likely less for meningitis.  Also consider toxidrome.  Patient has been recently on tramadol and Neurontin which are relatively new medications for her.  She has been in excellent health until she started having some lumbar radiculopathy for which she underwent injections this past October.  Perhaps she was taking too much Neurontin which could contribute to hyponatremia.    Additionally, we have to consider vascular, infection, toxidrome, and of course the most likely hyponatremia due to electrolyte abnormalities with decreased consciousness.  Etiology for the hyponatremia still to be determined.

## 2023-12-27 NOTE — PROGRESS NOTES
[facility]  [address] [address]  Phone: [phone]    (Hospital) Internal Medicine  Progress Note      PATIENT NAME: Bhavani Galarza  MRN: [id number]  TODAY'S DATE: 12/27/2023  ADMIT DATE: 12/26/2023    SUBJECTIVE     PRINCIPLE PROBLEM: Encephalopathy, metabolic    INTERVAL HISTORY:    12/27/2023  Patient overnight showed considerable improvement.  She is able to converse this morning.  She is intermittently but more predominantly following commands.  She was to visitors in the room who are helping her..  She is able to tolerate liquids.  She is asking for advance diet.    Her sodium levels are barely improving with 0.9% saline.  She has had little urine output as well.    She also seems to have a left-sided neglect that is detectable this morning.    Review of patient's allergies indicates:  No Known Allergies    ROS left-sided neglect, weakness generalized nonfocal    OBJECTIVE     VITAL SIGNS (Most Recent)  Temp: 98.2 °F (36.8 °C) (12/27/23 0800)  Pulse: 75 (12/27/23 0800)  Resp: 20 (12/27/23 0718)  BP: 124/72 (12/27/23 0800)  SpO2: (!) 93 % (12/27/23 0800)    VENTILATION STATUS  Resp: 20 (12/27/23 0718)  SpO2: (!) 93 % (12/27/23 0800)           I & O (Last 24H):  Intake/Output Summary (Last 24 hours) at 12/27/2023 0837  Last data filed at 12/27/2023 0417  Gross per 24 hour   Intake --   Output 75 ml   Net -75 ml       WEIGHTS  Wt Readings from Last 3 Encounters:   12/26/23 1719 45.4 kg (100 lb)   12/26/23 1050 45.4 kg (100 lb)   10/18/23 1343 40.8 kg (90 lb)   09/19/23 1129 43.1 kg (95 lb)       Physical Exam    On physical exam patient is alert and oriented times 2.  Her friend Mr. Pickett and another friend of hers are present during the exam.  She seems to have a left-sided feel neglect either him in home ominous hemianopsia focal to the left side.  Again a left visual field deficit.  Pupils are equally round and reactive to light accommodation.  No JVD.  She is  "regular rate and rhythm no rubs gallops or murmurs clear to auscultation bilaterally on nasal cannula oxygen abdomen is soft nontender nondistended no clubbing cyanosis or edema to the left lower extremity or right lower extremity.  She does have a ecchymotic region to the left ankle.  She is full range of motion of the foot.  No malleolar tenderness with range of motion no tenderness to the knee.    She is full range of motion of upper and lower extremities 4/5 strength globally.      SCHEDULED MEDS:   albuterol-ipratropium  3 mL Nebulization Q6H    aspirin  81 mg Oral QHS    azithromycin  500 mg Intravenous Q24H    cefTRIAXone (ROCEPHIN) IVPB  1 g Intravenous Q24H    enoxparin  40 mg Subcutaneous Daily    polyethylene glycol  17 g Oral Daily       CONTINUOUS INFUSIONS:   sodium chloride 0.9% 75 mL/hr at 12/26/23 2118       PRN MEDS:acetaminophen, melatonin, ondansetron, sodium chloride 0.9%, sodium chloride 0.9%    LABS AND DIAGNOSTICS     CBC LAST 3 DAYS  Recent Labs   Lab 12/26/23  1248 12/27/23  0756   WBC 10.37 6.93   RBC 4.10* 3.79*   HGB 13.1 12.1   HCT 38.6 35.6*   MCV 94.1* 93.9   MCH 32.0* 31.9*   MCHC 33.9 34.0   RDW 13.2 13.2    266   MPV 9.2 8.9       COAGULATION LAST 3 DAYS  No results for input(s): "LABPT", "INR", "APTT" in the last 168 hours.    CHEMISTRY LAST 3 DAYS  Recent Labs   Lab 12/26/23  1248 12/27/23  0756   * 130*   K 4.4 3.6   CO2 24 22*   BUN 13.0 13.0   CREATININE 0.67 0.63   CALCIUM 8.7 8.2*   MG 1.90  --    ALBUMIN 3.4  --    ALKPHOS 85  --    ALT 39  --    AST 46*  --    BILITOT 0.8  --        CARDIAC PROFILE LAST 3 DAYS  No results for input(s): "BNP", "CPK", "CPKMB", "LDH", "TROPONINI" in the last 168 hours.    ENDOCRINE LAST 3 DAYS  Recent Labs   Lab 12/26/23  1248   TSH 0.709       LAST ARTERIAL BLOOD GAS  ABG  No results for input(s): "PH", "PO2", "PCO2", "HCO3", "BE" in the last 168 hours.    LAST 7 DAYS MICROBIOLOGY   Microbiology Results (last 7 days)       " Procedure Component Value Units Date/Time    Blood culture #1 **CANNOT BE ORDERED STAT** [[id number]] Collected: 12/26/23 1248    Order Status: Resulted Specimen: Blood from Arm, Right Updated: 12/26/23 1251    Blood culture #2 **CANNOT BE ORDERED STAT** [[id number]] Collected: 12/26/23 1248    Order Status: Resulted Specimen: Blood from Arm, Right Updated: 12/26/23 1251            MOST RECENT IMAGING  X-Ray Chest 1 View  Narrative: EXAMINATION:  XR CHEST 1 VIEW    CLINICAL HISTORY:  , Shortness of breath.    COMPARISON:  None available    FINDINGS:  An AP view or more reveals the heart to be borderline enlarged.  There is patchy opacification at the lower lungs bilaterally.  Atherosclerosis is seen within the aorta.  Degenerative changes and curvature are noted to the thoracic spine.  Bony structures are considerably osteopenic.  Impression: 1. Patchy infiltrate and or atelectasis lower lungs bilaterally  2. Borderline cardiomegaly  3. Atherosclerosis  4. Thoracic spondylosis, scoliosis, and osteopenia    Electronically signed by: Phill Giles  Date:    12/26/2023  Time:    16:33  X-Ray Ankle Complete Left  Narrative: EXAMINATION:  XR ANKLE COMPLETE 3 VIEW LEFT    CLINICAL HISTORY:  Injury, unspecified, initial encounter; .    COMPARISON:  None available.    FINDINGS:  AP, lateral, and obliques views reveal no definite fracture or dislocation.  The ankle mortise is intact.  No aggressive osteolytic or osteoblastic lesion is seen.  Bony structures are osteopenic.  Minimal enthesophyte formation is evident at the posterior calcaneus  Impression: 1. No acute osseous defect identified  2. Minimal enthesophyte formation posterior calcaneus  3. Osteopenia  4. MR examination would allow further evaluation if clinically indicated    Electronically signed by: Phill Giles  Date:    12/26/2023  Time:    14:35  X-Ray Tibia Fibula 2 View Left  Narrative: EXAMINATION:  XR TIBIA FIBULA 2 VIEW LEFT    CLINICAL HISTORY:  Injury,  unspecified, initial encounter; .    COMPARISON:  None available.    FINDINGS:  AP and lateral views reveal no definite fracture or dislocation.  No aggressive osteolytic or osteoblastic lesions seen.Bony structures are osteopenic.  There is narrowing of the medial compartment space.  Impression: 1. No acute osseous defect identified  2. Mild osteoarthritis  3. Osteopenia    Electronically signed by: Phill Giles  Date:    12/26/2023  Time:    14:35  CT Head Without Contrast  Narrative: EXAMINATION:  CT HEAD WITHOUT CONTRAST    CLINICAL HISTORY:  Mental status change, unknown cause;, .    TECHNIQUE:  PATIENT RADIATION DOSE: DLP(mGycm) 871    As per PQRS measures, all CT scans at this facility used dose modulation, iterative reconstruction, and/or weight based dose adjustment when appropriate to reduce radiation dose to as low as reasonably achievable.    COMPARISON:  None available.    FINDINGS:  Serial axial images were obtained of the head without the administration of IV contrast. Both brain and bone parenchymal windows were obtained.  Additional coronal and sagittal reconstructions were obtained.  Ventricles, cisterns, and sulci are prominent in size.  There is no evidence of intracranial hemorrhage, midline shift, or abnormal extra-axial fluid collections.  There is low attenuation at the distribution of the right middle cerebral artery with sulcal effacement suspicious for evolving/old nonacute nonhemorrhagic infarct.  Minimal intracranial atherosclerosis is seen.  Mild scattered hypodensities are seen within the periventricular white matter.  Cerebellar tonsils extend caudally to the level of foramen magnum.  The sella turcica is mostly empty.  Visualized paranasal sinuses and mastoid air cells are relatively clear.  External auditory canals are grossly patent.  Bony structures are osteopenic.  Impression: 1. 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 Mild cerebral and cerebellar atrophy  3. Minimal intracranial atherosclerosis  4. Mild scattered hypodensity in periventricular white matter suspicious for small vessel ischemic changes    Electronically signed by: Phill Giles  Date:    12/26/2023  Time:    13:14  US Lower Extremity Arteries Left  Narrative: EXAMINATION:  LEFT LOWER EXTREMITY ARTERIAL DOPPLER SONOGRAM:    CLINICAL HISTORY:  Other disorder of circulatory system,    COMPARISON:  05/29/2023    FINDINGS:  Ultrasound Doppler and color flow imaging were performed on the arteries of the left lower extremity.  A multi phasic flow wave pattern is evident throughout the visualized arteries of the left lower extremity.  Mild scattered plaque formation is evident at the arteries of left lower extremity with no significant focal stenosis or localized atherosclerotic plaque formation identified.  Impression: 1.  Mild scattered plaque formation at the arteries of the left lower extremity with no significant focal stenosis or localized atherosclerotic plaque formation identified    Electronically signed by: Phill Giles  Date:    12/26/2023  Time:    13:10      LASTECHO  No results found for this or any previous visit.      CURRENT/PREVIOUS VISIT EKG  No results found for this or any previous visit.    ASSESSMENT/PLAN:     Active Hospital Problems    Diagnosis    *Encephalopathy, metabolic    Other localized visual field defect, left eye    Transient alteration of awareness    Hypoxemia    Hyponatremia    Bruised sole of foot, left, initial encounter       ASSESSMENT & PLAN:   Encephalopathy likely caused by hyponatremia.  This may be tied to a possible focal all stroke on the right middle cerebral artery which would also affect her vision on her left eye including the defect.    I presumed that the hypoxemia maybe due to this neurologic alteration which may have contributed to g gastric aspiration.  She does not seem to have a full  pneumonia based off of symptomatology.  Will continue oxygen antibiotics for next 24 hours and then deescalate.      4 hyponatremia I think she will correct with a little bit more fluid.  Will consider hot salt laid in the day if she does not.    Will start the patient on aspirin statin and get scans of her carotids and echocardiogram and consult Cardiology  RECOMMENDATIONS:  DVT prophylaxis she has SCDs.  GI prophylaxis is willing to be famotidine now that she will be on aspirin        Tomer Samaniego III, MD  Department of Hospital Medicine ([facility])   Date of Service: 12/27/2023  8:32 AM

## 2023-12-27 NOTE — PT/OT/SLP EVAL
Physical Therapy Evaluation    Patient Name:  Bhavani Galarza   MRN:  [id number]    Recommendations:     Discharge Recommendations: High Intensity Therapy   Discharge Equipment Recommendations: walker, rolling   Barriers to discharge: Decreased caregiver support    Assessment:     Bhavani Galarza is a 89 y.o. female admitted with a medical diagnosis of Encephalopathy, metabolic.  She presents with the following impairments/functional limitations: weakness, impaired endurance, impaired self care skills, impaired functional mobility, impaired sensation, gait instability, impaired balance, visual deficits, impaired cognition, decreased coordination, decreased upper extremity function, decreased lower extremity function, decreased safety awareness, impaired coordination .    Rehab Prognosis: Good; patient would benefit from acute skilled PT services to address these deficits and reach maximum level of function.    Recent Surgery: * No surgery found *      Plan:     During this hospitalization, patient to be seen 5 x/week to address the identified rehab impairments via gait training, therapeutic activities, therapeutic exercises and progress toward the following goals:    Plan of Care Expires:       Subjective     Chief Complaint: Pt with decreased responsiveness when addressed on the left side, left neglect apparent.  Pt req. Increased processing time for commands/instructions.  Patient/Family Comments/goals: Pt caregiver wants her to be stronger before going home and states she is not safely walking with rollator in the home and is at risk of falls using that rollator as an AD.  Pain/Comfort:       Patients cultural, spiritual, Orthodox conflicts given the current situation:      Living Environment:  Pt lives alone at home  Prior to admission, patients level of function was I amb with rollator.  Equipment used at home: cane, straight, rollator.  DME owned (not currently used): none.  Upon discharge, patient will  have assistance from friend.    Objective:     Communicated with pt and friend prior to session.  Patient found HOB elevated with    upon PT entry to room.    General Precautions: Standard, fall, vision impaired, other (see comments) (left neglect)  Orthopedic Precautions:    Braces:    Respiratory Status: Nasal cannula, flow 2 L/min    Exams:  RLE ROM: WFL  LLE ROM: WFL except neglect of L UE    Functional Mobility:  Bed Mobility:     Scooting: maximal assistance  Supine to Sit: moderate assistance  Transfers:     Sit to Stand:  maximal assistance with rolling walker  Gait: amb 50ft modA RW  Balance: fair- standing      AM-PAC 6 CLICK MOBILITY  Total Score:11       Treatment & Education:  Tf to bed modA    Patient left HOB elevated with all lines intact, call button in reach, and bed alarm on.    GOALS:   Multidisciplinary Problems       Physical Therapy Goals          Problem: Physical Therapy    Goal Priority Disciplines Outcome Goal Variances Interventions   Physical Therapy Goal     PT, PT/OT Ongoing, Progressing     Description: 1.  Pt will tf Marcia  2.  Pt will amb 150ft RW Marcia  3.  Pt will be I HEP to maintain strength while inpt                       History:     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        Past Surgical History:   Procedure Laterality Date    BACK SURGERY      COLONOSCOPY      X 2    EPIDURAL STEROID INJECTION INTO LUMBAR SPINE Right 10/20/2023    Procedure: INJECTION, STEROID, SPINE, LUMBAR, EPIDURAL (Right L5- S1 ILESI);  Surgeon: Amrit Song MD;  Location: [facility] OR;  Service: Pain Management;  Laterality: Right;    TONSILLECTOMY      TRANSFORAMINAL EPIDURAL INJECTION OF STEROID Right 09/22/2023    Procedure: INJECTION, STEROID, EPIDURAL, TRANSFORAMINAL APPROACH (Right TFESI L4 & L5);  Surgeon: Amrit oSng MD;  Location: [facility] OR;  Service: Pain Management;  Laterality: Right;       Time Tracking:     PT Received On:  12/27/23  PT Start Time: 0800     PT Stop Time: 0830  PT Total Time (min): 30 min     Billable Minutes: Evaluation 15 and Gait Training 15      12/27/2023

## 2023-12-27 NOTE — ASSESSMENT & PLAN NOTE
Patient has hyponatremia which is uncontrolled,We will aim to correct the sodium by 4-6mEq in 24 hours. We will monitor sodium Daily. The hyponatremia is due to yet to be determined.  Will get some sodium repeat labs in the morning.  I will also get some labs for adrenal insufficiency.. We will obtain the following studies: Urine sodium, urine osmolality, serum osmolality, AM cortisol, or TSH, T4. We will treat the hyponatremia with IV fluids as follows:  Will continue IV fluids normal saline. The patient's sodium results have been reviewed and are listed below.  Recent Labs   Lab 12/26/23  1248   *

## 2023-12-27 NOTE — PT/OT/SLP EVAL
Speech Language Pathology Evaluation  Cognitive/Bedside Swallow    Patient Name:  Bhavani Galarza   MRN:  [id number]  Admitting Diagnosis: Encephalopathy, metabolic    Recommendations:                  General Recommendations:  Dysphagia therapy and Cognitive-linguistic therapy  Diet recommendations:   ,     Aspiration Precautions: 1 bite/sip at a time, Assistance with meals, Feed only when awake/alert, HOB to 90 degrees, and Monitor for s/s of aspiration   General Precautions: Standard,    Communication strategies:  provide increased time to answer and go to room if call light pushed    Assessment:     Bhavani Galarza is a 89 y.o. female with an SLP diagnosis of Dysphagia and Cognitive-Linguistic Impairment.  She presents with decreased following commands, orientation, short term memory, problem solving, safety awareness and mild oral dysphagia .    History:     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        Past Surgical History:   Procedure Laterality Date    BACK SURGERY      COLONOSCOPY      X 2    EPIDURAL STEROID INJECTION INTO LUMBAR SPINE Right 10/20/2023    Procedure: INJECTION, STEROID, SPINE, LUMBAR, EPIDURAL (Right L5- S1 ILESI);  Surgeon: Amrit Song MD;  Location: [facility];  Service: Pain Management;  Laterality: Right;    TONSILLECTOMY      TRANSFORAMINAL EPIDURAL INJECTION OF STEROID Right 09/22/2023    Procedure: INJECTION, STEROID, EPIDURAL, TRANSFORAMINAL APPROACH (Right TFESI L4 & L5);  Surgeon: Amrit Song MD;  Location: [facility];  Service: Pain Management;  Laterality: Right;       Social History: Patient lives alone.    Prior Intubation HX:  N/A    Modified Barium Swallow: N/A    Chest X-Rays: Please see medical records    Prior diet: Regular diet consistency with thin liquids.    Occupation/hobbies/homemaking: cooking, entertaining, her friends.    Subjective     Pt alert and cooperative upright in bed with friend at  bedside.  Patient goals: To stop leg pain and return home     Pain/Comfort:       Respiratory Status: Room air    Objective:     Cognitive Status:    Orientation Person, Place, and Situation  Memory recall mild-mod  Problem [employer]ving [employer] mild  Safety awareness mild       Receptive Language:   Comprehension:      Commands  two step basic commands      Pragmatics:    WFL    Expressive Language:  Verbal:    Conversational speech simple      Motor Speech:  WFL    Voice:   WFL    Visual-Spatial:  Left Neglect     Reading:   Unable to assess        Written Expression:   DNT    Oral Musculature Evaluation       Bedside Swallow Eval:   Consistencies Assessed:  Thin liquids, soft solids, solids     Oral Phase:   Slow oral transit time    Pharyngeal Phase:   no overt clinical signs/symptoms of aspiration  no overt clinical signs/symptoms of pharyngeal dysphagia    Compensatory Strategies  None    Treatment: ST 3-5 x per week to increase receptive/expressive language, cognitive skills, and mild oral weakness    Goals:   Long Term Goal   Increase receptive/expressive language skills and cognitive-linguistic skills to WFL  Increase strength and efficiency of swallow for regular diet consistency and thin liquids without s/s of aspiration.  Short-Term Goals:   Pt to tolerate soft and bite sized IDDSI L6 diet with thin liquids without s/s of aspiration.  Pt to complete oral motor exercises x 15 reps/2  Pt to answer orientation questions with 80% acc  Pt to follow 3 step commands with 75% acc.      Plan:     Patient to be seen:   3-5x/week   Plan of Care expires:   1/12/23  Plan of Care reviewed with:    Patient, patient's friend at bedside, treatment team  SLP Follow-Up:     Yes     Discharge recommendations:      Barriers to Discharge:  Level of Skilled Assistance Needed   and Safety Awareness      Time Tracking:     SLP Treatment Date:    12/27/23  Speech Start Time:   9:00 AM  Speech Stop Time:    9:30 AM    Speech Total  Time (min):   30    Billable Minutes: Eval Swallow and Oral Function      12/27/2023

## 2023-12-27 NOTE — ASSESSMENT & PLAN NOTE
Unclear etiology.  The foot bruise.  It looks rather chronic.  Make sure that there has no fracture there.  Will x-ray foot and ankle in the morning

## 2023-12-28 PROBLEM — I63.511 ACUTE ISCHEMIC RIGHT MIDDLE CEREBRAL ARTERY (MCA) STROKE: Status: ACTIVE | Noted: 2023-12-28

## 2023-12-28 PROBLEM — K59.00 CONSTIPATION: Status: ACTIVE | Noted: 2023-12-28

## 2023-12-28 LAB
ALBUMIN SERPL-MCNC: 2.5 G/DL (ref 3.4–4.8)
ALBUMIN/GLOB SERPL: 0.9 RATIO (ref 1.1–2)
ALP SERPL-CCNC: 63 UNIT/L (ref 40–150)
ALT SERPL-CCNC: 24 UNIT/L (ref 0–55)
AST SERPL-CCNC: 27 UNIT/L (ref 5–34)
BASOPHILS # BLD AUTO: 0.05 X10(3)/MCL
BASOPHILS NFR BLD AUTO: 0.7 %
BILIRUB SERPL-MCNC: 0.5 MG/DL
BUN SERPL-MCNC: 12 MG/DL (ref 9.8–20.1)
CALCIUM SERPL-MCNC: 8.1 MG/DL (ref 8.4–10.2)
CHLORIDE SERPL-SCNC: 105 MMOL/L (ref 98–111)
CO2 SERPL-SCNC: 19 MMOL/L (ref 23–31)
CREAT SERPL-MCNC: 0.6 MG/DL (ref 0.55–1.02)
EOSINOPHIL # BLD AUTO: 0.05 X10(3)/MCL (ref 0–0.9)
EOSINOPHIL NFR BLD AUTO: 0.7 %
ERYTHROCYTE [DISTWIDTH] IN BLOOD BY AUTOMATED COUNT: 13.4 % (ref 11.5–17)
GFR SERPLBLD CREATININE-BSD FMLA CKD-EPI: >60 MLS/MIN/1.73/M2
GLOBULIN SER-MCNC: 2.7 GM/DL (ref 2.4–3.5)
GLUCOSE SERPL-MCNC: 102 MG/DL (ref 75–121)
HCT VFR BLD AUTO: 35.6 % (ref 37–47)
HGB BLD-MCNC: 11.8 G/DL (ref 12–16)
IMM GRANULOCYTES # BLD AUTO: 0.03 X10(3)/MCL (ref 0–0.04)
IMM GRANULOCYTES NFR BLD AUTO: 0.4 %
LYMPHOCYTES # BLD AUTO: 0.91 X10(3)/MCL (ref 0.6–4.6)
LYMPHOCYTES NFR BLD AUTO: 12.9 %
MAGNESIUM SERPL-MCNC: 2 MG/DL (ref 1.6–2.6)
MAYO GENERIC ORDERABLE RESULT: NORMAL
MAYO GENERIC ORDERABLE RESULT: NORMAL
MCH RBC QN AUTO: 31.6 PG (ref 27–31)
MCHC RBC AUTO-ENTMCNC: 33.1 G/DL (ref 33–36)
MCV RBC AUTO: 95.4 FL (ref 80–94)
MONOCYTES # BLD AUTO: 0.78 X10(3)/MCL (ref 0.1–1.3)
MONOCYTES NFR BLD AUTO: 11.1 %
NEUTROPHILS # BLD AUTO: 5.22 X10(3)/MCL (ref 2.1–9.2)
NEUTROPHILS NFR BLD AUTO: 74.2 %
OSMOLALITY UR: 356 MOSM/KG (ref 300–1300)
PHOSPHATE SERPL-MCNC: 2.8 MG/DL (ref 2.3–4.7)
PLATELET # BLD AUTO: 285 X10(3)/MCL (ref 130–400)
PMV BLD AUTO: 8.9 FL (ref 7.4–10.4)
POTASSIUM SERPL-SCNC: 3.5 MMOL/L (ref 3.5–5.1)
PROT SERPL-MCNC: 5.2 GM/DL (ref 5.8–7.6)
RBC # BLD AUTO: 3.73 X10(6)/MCL (ref 4.2–5.4)
SODIUM SERPL-SCNC: 132 MMOL/L (ref 132–146)
SODIUM SERPL-SCNC: 132 MMOL/L (ref 132–146)
SODIUM SERPL-SCNC: 135 MMOL/L (ref 132–146)
SODIUM UR-SCNC: 47 MMOL/L
WBC # SPEC AUTO: 7.04 X10(3)/MCL (ref 4.5–11.5)

## 2023-12-28 PROCEDURE — 99900035 HC TECH TIME PER 15 MIN (STAT)

## 2023-12-28 PROCEDURE — 25000003 PHARM REV CODE 250: Performed by: INTERNAL MEDICINE

## 2023-12-28 PROCEDURE — 63600175 PHARM REV CODE 636 W HCPCS: Performed by: INTERNAL MEDICINE

## 2023-12-28 PROCEDURE — 83935 ASSAY OF URINE OSMOLALITY: CPT | Performed by: INTERNAL MEDICINE

## 2023-12-28 PROCEDURE — 84300 ASSAY OF URINE SODIUM: CPT | Performed by: INTERNAL MEDICINE

## 2023-12-28 PROCEDURE — 94761 N-INVAS EAR/PLS OXIMETRY MLT: CPT

## 2023-12-28 PROCEDURE — 94640 AIRWAY INHALATION TREATMENT: CPT

## 2023-12-28 PROCEDURE — 97530 THERAPEUTIC ACTIVITIES: CPT

## 2023-12-28 PROCEDURE — 83735 ASSAY OF MAGNESIUM: CPT | Performed by: INTERNAL MEDICINE

## 2023-12-28 PROCEDURE — 27000221 HC OXYGEN, UP TO 24 HOURS

## 2023-12-28 PROCEDURE — 84100 ASSAY OF PHOSPHORUS: CPT | Performed by: INTERNAL MEDICINE

## 2023-12-28 PROCEDURE — 97116 GAIT TRAINING THERAPY: CPT

## 2023-12-28 PROCEDURE — 25000242 PHARM REV CODE 250 ALT 637 W/ HCPCS: Performed by: INTERNAL MEDICINE

## 2023-12-28 PROCEDURE — 80053 COMPREHEN METABOLIC PANEL: CPT | Performed by: INTERNAL MEDICINE

## 2023-12-28 PROCEDURE — 21400001 HC TELEMETRY ROOM

## 2023-12-28 PROCEDURE — 85025 COMPLETE CBC W/AUTO DIFF WBC: CPT | Performed by: INTERNAL MEDICINE

## 2023-12-28 PROCEDURE — 84295 ASSAY OF SERUM SODIUM: CPT | Performed by: INTERNAL MEDICINE

## 2023-12-28 RX ORDER — FACIAL-BODY WIPES
10 EACH TOPICAL DAILY
Status: DISCONTINUED | OUTPATIENT
Start: 2023-12-28 | End: 2024-01-03 | Stop reason: HOSPADM

## 2023-12-28 RX ADMIN — IPRATROPIUM BROMIDE AND ALBUTEROL SULFATE 3 ML: 2.5; .5 SOLUTION RESPIRATORY (INHALATION) at 07:12

## 2023-12-28 RX ADMIN — ACETAMINOPHEN 650 MG: 325 TABLET, FILM COATED ORAL at 01:12

## 2023-12-28 RX ADMIN — CLOPIDOGREL BISULFATE 75 MG: 75 TABLET ORAL at 09:12

## 2023-12-28 RX ADMIN — ACETAMINOPHEN 650 MG: 325 TABLET, FILM COATED ORAL at 03:12

## 2023-12-28 RX ADMIN — ATORVASTATIN CALCIUM 40 MG: 40 TABLET, FILM COATED ORAL at 09:12

## 2023-12-28 RX ADMIN — AZITHROMYCIN MONOHYDRATE 500 MG: 500 INJECTION, POWDER, LYOPHILIZED, FOR SOLUTION INTRAVENOUS at 03:12

## 2023-12-28 RX ADMIN — BISACODYL 10 MG: 10 SUPPOSITORY RECTAL at 12:12

## 2023-12-28 RX ADMIN — POLYETHYLENE GLYCOL 3350 17 G: 17 POWDER, FOR SOLUTION ORAL at 09:12

## 2023-12-28 RX ADMIN — CEFTRIAXONE SODIUM 1 G: 1 INJECTION, POWDER, FOR SOLUTION INTRAMUSCULAR; INTRAVENOUS at 05:12

## 2023-12-28 RX ADMIN — ENOXAPARIN SODIUM 40 MG: 40 INJECTION SUBCUTANEOUS at 05:12

## 2023-12-28 RX ADMIN — IPRATROPIUM BROMIDE AND ALBUTEROL SULFATE 3 ML: 2.5; .5 SOLUTION RESPIRATORY (INHALATION) at 01:12

## 2023-12-28 RX ADMIN — SODIUM CHLORIDE 22 ML/HR: 3 INJECTION, SOLUTION INTRAVENOUS at 07:12

## 2023-12-28 NOTE — PROGRESS NOTES
[facility]  [address] [address]  Phone: [phone]    (Hospital) Internal Medicine  Progress Note      PATIENT NAME: Bhavani Galarza  MRN: [id number]  TODAY'S DATE: 12/28/2023  ADMIT DATE: 12/26/2023    SUBJECTIVE     PRINCIPLE PROBLEM: Encephalopathy, metabolic    INTERVAL HISTORY:    12/28/2023  Overnight patient showed some signs of improvement in her mental status with utilization of 3% hot salt.  This was initiated last night after I saw that her sodium levels did not improve with isotonic saline.  I discussed this with the daughter and the friend last night.      Overnight the patient still complains of a little bit of desire to go the bathroom.  She does have a rectal bolus that is present.  She is tolerating her soft diet.      Her speech is somewhat better and more articular but definitely word findings and finally, she has pronounced defined left upper extremity deficit and left-sided neglect to her visual field she    Review of patient's allergies indicates:  No Known Allergies    ROS ironically at this stage tonight.  Lower extremity ecchymosis to the left side.  Otherwise 14 point review of systems are negative except as mentioned above    OBJECTIVE     VITAL SIGNS (Most Recent)  Temp: 98.6 °F (37 °C) (12/28/23 0912)  Pulse: 86 (12/28/23 0912)  Resp: 18 (12/28/23 0721)  BP: (!) 147/67 (12/28/23 0912)  SpO2: (!) 93 % (12/28/23 0912)    VENTILATION STATUS  Resp: 18 (12/28/23 0721)  SpO2: (!) 93 % (12/28/23 0912)           I & O (Last 24H):  Intake/Output Summary (Last 24 hours) at 12/28/2023 1124  Last data filed at 12/28/2023 0806  Gross per 24 hour   Intake 240 ml   Output 850 ml   Net -610 ml       WEIGHTS  Wt Readings from Last 3 Encounters:   12/26/23 1719 45.4 kg (100 lb)   12/26/23 1050 45.4 kg (100 lb)   10/18/23 1343 40.8 kg (90 lb)   09/19/23 1129 43.1 kg (95 lb)       Physical Exam    Patient is alert and oriented x2.  Her daughter Noreen is  "present during the evaluation.  To be intact cranial nerve 1 seems to be on the left side not intact because of a likely right-sided visual track deficit from involvement of the ischemic stroke.  Otherwise her cranial nerves 2-12 appear to be intact.  She has got a distinct left-sided upper extremity weakness.  The left lower extremity does show some weakness with dorsiflexion of the foot.  She is sensate.  She is regular rate and rhythm no rubs gallops or murmurs no JVD.  Clear to auscultation bilaterally soft nontender mildly distended abdomen.  No clubbing cyanosis or edema bilateral lower extremities.  Ecchymosis of the left foot unchanged.      SCHEDULED MEDS:   albuterol-ipratropium  3 mL Nebulization Q6H    atorvastatin  40 mg Oral Daily    azithromycin  500 mg Intravenous Q24H    bisacodyL  10 mg Rectal Daily    cefTRIAXone (ROCEPHIN) IVPB  1 g Intravenous Q24H    clopidogreL  75 mg Oral Daily    enoxparin  40 mg Subcutaneous Daily    polyethylene glycol  17 g Oral Daily       CONTINUOUS INFUSIONS:   sodium chloride 3% HYPERTONIC 22 mL/hr (12/27/23 2111)       PRN MEDS:acetaminophen, melatonin, ondansetron    LABS AND DIAGNOSTICS     CBC LAST 3 DAYS  Recent Labs   Lab 12/26/23  1248 12/27/23  0756 12/28/23  0510   WBC 10.37 6.93 7.04   RBC 4.10* 3.79* 3.73*   HGB 13.1 12.1 11.8*   HCT 38.6 35.6* 35.6*   MCV 94.1* 93.9 95.4*   MCH 32.0* 31.9* 31.6*   MCHC 33.9 34.0 33.1   RDW 13.2 13.2 13.4    266 285   MPV 9.2 8.9 8.9       COAGULATION LAST 3 DAYS  No results for input(s): "LABPT", "INR", "APTT" in the last 168 hours.    CHEMISTRY LAST 3 DAYS  Recent Labs   Lab 12/26/23  1248 12/27/23  0756 12/27/23  1706 12/27/23  2345 12/28/23  0510   * 130* 129* 132 132   K 4.4 3.6 3.9  --  3.5   CO2 24 22* 23  --  19*   BUN 13.0 13.0 16.0  --  12.0   CREATININE 0.67 0.63 0.76  --  0.60   CALCIUM 8.7 8.2* 7.9*  --  8.1*   MG 1.90  --   --   --  2.00   ALBUMIN 3.4  --  2.6*  --  2.5*   ALKPHOS 85  --  64  --  " "63   ALT 39  --  26  --  24   AST 46*  --  28  --  27   BILITOT 0.8  --  0.4  --  0.5       CARDIAC PROFILE LAST 3 DAYS  No results for input(s): "BNP", "CPK", "CPKMB", "LDH", "TROPONINI" in the last 168 hours.    ENDOCRINE LAST 3 DAYS  Recent Labs   Lab 12/26/23  1248   TSH 0.709       LAST ARTERIAL BLOOD GAS  ABG  No results for input(s): "PH", "PO2", "PCO2", "HCO3", "BE" in the last 168 hours.    LAST 7 DAYS MICROBIOLOGY   Microbiology Results (last 7 days)       Procedure Component Value Units Date/Time    Blood culture #1 **CANNOT BE ORDERED STAT** [[id number]]  (Normal) Collected: 12/26/23 1248    Order Status: Completed Specimen: Blood from Arm, Right Updated: 12/27/23 1801     CULTURE, BLOOD ([facility]) No Growth At 24 Hours    Blood culture #2 **CANNOT BE ORDERED STAT** [[id number]]  (Normal) Collected: 12/26/23 1248    Order Status: Completed Specimen: Blood from Arm, Right Updated: 12/27/23 1801     CULTURE, BLOOD ([facility]) No Growth At 24 Hours            MOST RECENT IMAGING  CT Head Without Contrast  Narrative: EXAMINATION:  CT HEAD WITHOUT CONTRAST    CLINICAL HISTORY:  Stroke, follow up;, .    TECHNIQUE:  PATIENT RADIATION DOSE: DLP(mGycm) 844    As per PQRS measures, all CT scans at this facility used dose modulation, iterative reconstruction, and/or weight based dose adjustment when appropriate to reduce radiation dose to as low as reasonably achievable.    COMPARISON:  12/26/2023    FINDINGS:  Serial axial images were obtained of the head without the administration of IV contrast. Both brain and bone parenchymal windows were obtained.  Additional coronal and sagittal reconstructions were obtained.  Ventricles, cisterns, and sulci are prominent size.  There is again a low-attenuation area at the distribution of the right middle cerebral artery compatible with the old/evolving infarct/encephalomalacia.  There is no evidence of intracranial hemorrhage, midline shift, or abnormal extra-axial fluid collections.  " Scattered hypodensities are seen within the periventricular white matter.  Intracranial atherosclerosis is identified.  Cerebellar tonsils extend caudally to the level of foramen magnum.  The sella turcica is mostly empty.  Visualized paranasal sinuses and mastoid air cells are relatively clear.  External auditory canals are grossly patent.  Impression: 1. No acute intracranial abnormality identified  2. Old/evolving nonhemorrhagic infarct/encephalomalacia again identified in the distribution of the right middle cerebral artery which has a similar appearance to the recent exam  3. Generalized cerebral and cerebellar atrophy  4. Intracranial atherosclerosis  5. Scattered hypodensities at the peripheral white matter suspicious for small vessel ischemic changes    Electronically signed by: Phill Giles  Date:    12/28/2023  Time:    11:08  X-Ray Chest 1 View  Narrative: EXAMINATION:  XR CHEST 1 VIEW    CLINICAL HISTORY:  Hypoxia.;, .    COMPARISON:  12/26/2023    FINDINGS:  An AP view or more reveals the heart to be borderline enlarged.  The trachea is just left of midline.  There is interim increasing hazy opacification of the right mid and lower lung and left lower lung since the prior exam.  Atherosclerosis is seen within the aorta.  Bony structures are osteopenic.  Impression: 1. Interim increasing AZ opacification right mid and lower lung left lower lung suspicious for infiltrate, atelectasis, and pleural reaction  2. Borderline cardiomegaly  3. Atherosclerosis  4. Osteopenia    Electronically signed by: Phill Giles  Date:    12/28/2023  Time:    10:42  X-Ray Abdomen AP 1 View  Narrative: EXAMINATION:  XR ABDOMEN AP 1 VIEW    CLINICAL HISTORY:  R/o fecal bolus;, .    COMPARISON:  None available    FINDINGS:  Single AP or more views reveal unusual lucency at the uppermost abdomen.  There is suspect gas distension of the stomach.  Gas distended loops of large and small bowel are identified.  There is a fecal  bolus at the rectum.  Degenerative changes and curvature are noted to the thoracolumbar spine.  Bony structures are osteopenic.Gas and feces are seen within the colon.  Impression: 1. Unusual lucency noted to the upper most abdomen which may be due to a protruding lower abdomen  2. Gas filled loops of large and small bowel fecal bolus at the rectum  3. Thoracolumbar spondylosis and scoliosis  4. Osteopenia    Electronically signed by: Phill Giles  Date:    12/28/2023  Time:    10:41      [facility]  Results for orders placed during the hospital encounter of 12/26/23    Echo    Interpretation Summary    Right Ventricle: Right ventricle was not well visualized due to poor acoustic window. Normal right ventricular cavity size. Systolic function is normal.    Left Atrium: Left atrium is mildly dilated.    Right Atrium: Right atrium is mildly dilated.    Aortic Valve: Moderately calcified cusps. There is moderate stenosis. Aortic valve peak velocity is 2.85 m/s. There is moderate to severe aortic regurgitation. AR PHTN 274 msec.    If clinically indicated a ALMAS can be helpful to further assess the severity of AV disease.    Left Ventricle: The left ventricle is normal in size. There is mild concentric hypertrophy. There is normal systolic function with a visually estimated ejection fraction of 55 - 60%.    Mitral Valve: Moderately thickened leaflets. Moderately calcified leaflets. There is mild stenosis. The mean pressure gradient across the mitral valve is 6 mmHg at a heart rate of  bpm. There is mild to moderate regurgitation.    Tricuspid Valve: There is no stenosis. There is mild regurgitation.    Pulmonic Valve: There is no stenosis.    Pleural effusion is present which can be better assessed by dedicated chest imaging.      CURRENT/PREVIOUS VISIT EKG  Results for orders placed or performed during the hospital encounter of 12/26/23   EKG 12-lead    Collection Time: 12/27/23 12:10 PM    Narrative    Test Reason :  I69.952,    Vent. 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  Will continue stroke vitals follow her glucose, follow her blood pressures which have been stable, also permissive hypertension.  Will watch for any fevers as well.  Lengthy explanation family about therapies window of therapies and management at this point conservatively considering the patient's age.  They are both in agreement    For constipation will give her a Dulcolax suppository and then some fleets if necessary.      Continue PT OT ST.      Hypoxemia likely due to an effusion versus pneumonic process developing right side.  Maintain antibiotics at this point.    RECOMMENDATIONS:  DVT prophylaxis with Lovenox.  GI prophylaxis with famotidine which will be added    Lengthy discussion proximally greater than 30 minutes with the daughter Noreen as well as the son Luis Alfredo by cell phone.  I answered their questions.  I expect the patient to be likely skilled nursing facility bound versus [facility] stroke [facility].  Family prefers locally due to the note variety of their mother who is very involved  with the community.  They think that will help stimulate her and I agree.  But will do the best we can with placement with the help of Francine.  I appreciate her assistance.      There also understanding in terms of DNR/DNI.  I will put that order in his well.        Tomer Samaniego III, MD  Department of Hospital Medicine ([facility])   Date of Service: 12/28/2023  11:18 AM

## 2023-12-28 NOTE — PROGRESS NOTES
[facility] - [facility] Surgical Unit  Cardiology  Progress Note    Patient Name: Bhavani Galarza  MRN: [id number]  Admission Date: 12/26/2023  Hospital Length of Stay: 2 days  Code Status: Full Code   Attending Provider: Tomer Samaniego III, *   Consulting Provider: ARTURO Jasso  Primary Care Physician: Tomer Samaniego III, MD  Principal Problem:Encephalopathy, metabolic    Patient information was obtained from patient, caregiver / friend, and primary team.       Subjective:     Chief Complaint:  Weakness     HPI: Patient is a 89 yo female known to [facility]. She was brought to ED with AMS and weakness. CT of the head showed non-acute CVA. [facility] consulted due to CVA. This morning she is resting in bed without distress. She denies any CP or SOB.   12/28/23: Patient resting in bed without distress. She denies any CP or SOB. She still has left sided weakness.     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        Past Surgical History:   Procedure Laterality Date    BACK SURGERY      COLONOSCOPY      X 2    EPIDURAL STEROID INJECTION INTO LUMBAR SPINE Right 10/20/2023    Procedure: INJECTION, STEROID, SPINE, LUMBAR, EPIDURAL (Right L5- S1 ILESI);  Surgeon: Amrit Song MD;  Location: [facility] OR;  Service: Pain Management;  Laterality: Right;    TONSILLECTOMY      TRANSFORAMINAL EPIDURAL INJECTION OF STEROID Right 09/22/2023    Procedure: INJECTION, STEROID, EPIDURAL, TRANSFORAMINAL APPROACH (Right TFESI L4 & L5);  Surgeon: Amrit Song MD;  Location: [facility] OR;  Service: Pain Management;  Laterality: Right;       Review of patient's allergies indicates:  No Known Allergies    Current Facility-Administered Medications on File Prior to Encounter   Medication    lactated ringers infusion     Current Outpatient Medications on File Prior to Encounter   Medication Sig    aspirin (ECOTRIN) 81 MG EC tablet Take 81 mg by mouth every evening. Stopped  9/21/23    gabapentin (NEURONTIN) 300 MG capsule Take 300 mg by mouth 2 (two) times daily.    traMADoL (ULTRAM) 50 mg tablet Take 50 mg by mouth every 6 (six) hours as needed.     Family History       Problem Relation (Age of Onset)    Heart attack Father    No Known Problems Mother          Tobacco Use    Smoking status: Never    Smokeless tobacco: Never   Substance and Sexual Activity    Alcohol use: Not Currently    Drug use: Never    Sexual activity: Not Currently     Review of Systems   Constitutional: Negative.   Cardiovascular: Negative.    Respiratory: Negative.     Musculoskeletal:  Positive for muscle weakness.     Objective:     Vital Signs (Most Recent):  Temp: 98.6 °F (37 °C) (12/28/23 0912)  Pulse: 86 (12/28/23 0912)  Resp: 18 (12/28/23 0721)  BP: (!) 147/67 (12/28/23 0912)  SpO2: (!) 93 % (12/28/23 0912) Vital Signs (24h Range):  Temp:  [98.5 °F (36.9 °C)-100.2 °F (37.9 °C)] 98.6 °F (37 °C)  Pulse:  [70-86] 86  Resp:  [18-20] 18  SpO2:  [91 %-96 %] 93 %  BP: (118-149)/(66-81) 147/67     Weight: 45.4 kg (100 lb)  Body mass index is 18.89 kg/m².    SpO2: (!) 93 %         Intake/Output Summary (Last 24 hours) at 12/28/2023 0959  Last data filed at 12/28/2023 0806  Gross per 24 hour   Intake 240 ml   Output 850 ml   Net -610 ml         Lines/Drains/Airways       Peripheral Intravenous Line  Duration                  Peripheral IV - Single Lumen 12/27/23 1009 20 G Posterior;Right Forearm <1 day                    Physical Exam  Constitutional:       General: She is not in acute distress.     Appearance: She is not ill-appearing.   Eyes:      Extraocular Movements: Extraocular movements intact.   Cardiovascular:      Rate and Rhythm: Normal rate and regular rhythm.      Heart sounds: No murmur heard.  Pulmonary:      Effort: Pulmonary effort is normal. No respiratory distress.      Breath sounds: Normal breath sounds.   Musculoskeletal:         General: No swelling.      Comments: Left sided weakness    Skin:     General: Skin is warm and dry.   Neurological:      Mental Status: She is alert and oriented to person, place, and time.   Psychiatric:         Mood and Affect: Mood normal.         Behavior: Behavior normal.         Significant Labs: CMP   Recent Labs   Lab 12/26/23  1248 12/27/23  0756 12/27/23  1706 12/27/23  2345 12/28/23  0510   * 130* 129* 132 132   K 4.4 3.6 3.9  --  3.5   CO2 24 22* 23  --  19*   BUN 13.0 13.0 16.0  --  12.0   CREATININE 0.67 0.63 0.76  --  0.60   CALCIUM 8.7 8.2* 7.9*  --  8.1*   ALBUMIN 3.4  --  2.6*  --  2.5*   BILITOT 0.8  --  0.4  --  0.5   ALKPHOS 85  --  64  --  63   AST 46*  --  28  --  27   ALT 39  --  26  --  24      and CBC   Recent Labs   Lab 12/26/23  1248 12/27/23  0756 12/28/23  0510   WBC 10.37 6.93 7.04   HGB 13.1 12.1 11.8*   HCT 38.6 35.6* 35.6*    266 285         Significant Imaging:     Echocardiogram 8/23:     - EF 55%    - Mild AS (MG 8.8), m/m AI  Assessment and Plan:     CVA with left sided weakness  Continue Plavix and statin  Echo and CUS were okay  No PAF noted on telemetry   Continue with physical therapy  Hyponatremia  Improved  PAD, minimal  VHD, AI      Thank you for your consult.   Please call with any further questions      Kedar Wei, ARTURO  Cardiology   [facility]

## 2023-12-28 NOTE — PLAN OF CARE
Problem: Skin Injury Risk Increased  Goal: Skin Health and Integrity  Outcome: Ongoing, Progressing     Problem: Fluid Imbalance (Pneumonia)  Goal: Fluid Balance  Outcome: Ongoing, Progressing     Problem: Infection (Pneumonia)  Goal: Resolution of Infection Signs and Symptoms  Outcome: Ongoing, Progressing     Problem: Pain Acute  Goal: Acceptable Pain Control and Functional Ability  Outcome: Ongoing, Progressing     Problem: Fall Injury Risk  Goal: Absence of Fall and Fall-Related Injury  Outcome: Ongoing, Progressing

## 2023-12-28 NOTE — PLAN OF CARE
12/28/23 1136   Discharge Assessment   Assessment Type Discharge Planning Assessment   Source of Information family   People in Home alone   Do you expect to return to your current living situation? No   Do you have help at home or someone to help you manage your care at home? No   Prior to hospitilization cognitive status: Alert/Oriented;No Deficits   Current cognitive status: Alert/Oriented;No Deficits   Equipment Currently Used at Home cane, straight;rollator   Do you currently have service(s) that help you manage your care at home? No   Do you take prescription medications? Yes   Do you have prescription coverage? Yes   Do you have any problems affording any of your prescribed medications? No   Is the patient taking medications as prescribed? yes   How do you get to doctors appointments? family or friend will provide   Are you on dialysis? No   Do you take coumadin? No   Discharge Plan A Skilled Nursing Facility   Discharge Plan B Skilled Nursing Facility   DME Needed Upon Discharge  none   Discharge Plan discussed with: Adult children   Transition of Care Barriers None   Physical Activity   On average, how many days per week do you engage in moderate to strenuous exercise (like a brisk walk)? Pt Declined   On average, how many minutes do you engage in exercise at this level? Pt Declined   Financial Resource Strain   How hard is it for you to pay for the very basics like food, housing, medical care, and heating? Pt Declined   Housing Stability   In the last 12 months, was there a time when you were not able to pay the mortgage or rent on time? Pt Declined   In the last 12 months, was there a time when you did not have a steady place to sleep or slept in a shelter (including now)? Pt Declined   Transportation Needs   In the past 12 months, has lack of transportation kept you from medical appointments or from getting medications? Pt Declined   In the past 12 months, has lack of transportation kept you from  meetings, work, or from getting things needed for daily living? Pt Declined   Food Insecurity   Within the past 12 months, you worried that your food would run out before you got the money to buy more. Pt Declined   Within the past 12 months, the food you bought just didn't last and you didn't have money to get more. Pt Declined   Stress   Do you feel stress - tense, restless, nervous, or anxious, or unable to sleep at night because your mind is troubled all the time - these days? Pt Declined   Social Connections   In a typical week, how many times do you talk on the phone with family, friends, or neighbors? Pt Declined   How often do you get together with friends or relatives? Pt Declined   How often do you attend Sabianism or Congregation services? Pt Declined   Do you belong to any clubs or organizations such as Sabianism groups, unions, fraternal or athletic groups, or school groups? Pt Declined   How often do you attend meetings of the clubs or organizations you belong to? Pt Declined   Are you , , , , never , or living with a partner? Pt Declined   Alcohol Use   Q1: How often do you have a drink containing alcohol? Pt Declined   Q2: How many drinks containing alcohol do you have on a typical day when you are drinking? Pt Declined   Q3: How often do you have six or more drinks on one occasion? Pt Declined     Met w/ daughter Agnes.  She is POA, cell # [phone].  She would like SNF at [facility], and then move to alf to [facility] 1st choice, [facility] 2nd. . [facility] has no beds at this time.  Referral sent to [facility].  Fide Mathias, SCC at [facility], for [address] and [address].     Referrals also sent to [facility] and [facility] for possible alf placement after skilled and daughter will contact them later.

## 2023-12-28 NOTE — PT/OT/SLP PROGRESS
Physical Therapy Treatment    Patient Name:  Bhavani Galarza   MRN:  [id number]    Recommendations:     Discharge Recommendations: High Intensity Therapy  Discharge Equipment Recommendations: walker, rolling  Barriers to discharge:  pt current medical status    Assessment:     Bhavani Galarza is a 89 y.o. female admitted with a medical diagnosis of Acute ischemic right middle cerebral artery (MCA) stroke.  She presents with the following impairments/functional limitations: weakness, impaired endurance, impaired self care skills, impaired functional mobility, impaired sensation, gait instability, impaired balance, visual deficits, impaired cognition, decreased coordination, decreased upper extremity function, decreased lower extremity function, decreased safety awareness, impaired coordination     Pt nurse in room upon PT entry to give a suppository. PT assisted with rolling towards the L with Mod A. Pt then performed supine to sit with Max A x2. Pt performed stand pivot transfer to [facility] with HHA and Mod A x2. Pt required increased time on BSC before returning to bed squat pivot and sit to supine with Max A x2 verb she was too tired to walk.    Rehab Prognosis: Fair; patient would benefit from acute skilled PT services to address these deficits and reach maximum level of function.    Recent Surgery: * No surgery found *      Plan:     During this hospitalization, patient to be seen 5 x/week to address the identified rehab impairments via gait training, therapeutic activities, therapeutic exercises and progress toward the following goals:    Plan of Care Expires:       Subjective     Chief Complaint: to go to the [facility]  Patient/Family Comments/goals: to go to SNF  Pain/Comfort:         Objective:     Communicated with pt and nurse prior to session.  Patient found HOB elevated with   upon PT entry to room.     General Precautions: Standard, fall, vision impaired, other (see comments) (left neglect)  Orthopedic  Precautions:    Braces:    Respiratory Status: Nasal cannula, flow 2 L/min     Functional Mobility:  Bed Mobility:     Rolling Left:  minimum assistance  Rolling Right: minimum assistance  Supine to Sit: maximal assistance and of 2 persons  Sit to Supine: maximal assistance and of 2 persons  Transfers:     Sit to Stand:  moderate assistance and of 2 persons with rolling walker  Bed to Chair: maximal assistance with  no AD  using  Stand Pivot  Gait: Pt amb 3ft with RW to [facility] with Mod A x1-2 persons due to BLE weakness and urgency      AM-PAC 6 CLICK MOBILITY          Treatment & Education:  See above treatment.    Patient left HOB elevated with all lines intact, call button in reach, bed alarm on, and daughter present..    GOALS:   Multidisciplinary Problems       Physical Therapy Goals          Problem: Physical Therapy    Goal Priority Disciplines Outcome Goal Variances Interventions   Physical Therapy Goal     PT, PT/OT Ongoing, Progressing     Description: 1.  Pt will tf Marcia  2.  Pt will amb 150ft RW Marcia  3.  Pt will be I HEP to maintain strength while inpt                       Time Tracking:     PT Received On: 12/28/23  PT Start Time: 1200     PT Stop Time: 1230  PT Total Time (min): 30 min     Billable Minutes: Gait Training 20 and Therapeutic Activity 10    Treatment Type: Treatment  PT/PTA: PT           12/28/2023

## 2023-12-29 LAB
SODIUM SERPL-SCNC: 137 MMOL/L (ref 132–146)
SODIUM SERPL-SCNC: 137 MMOL/L (ref 132–146)
SODIUM SERPL-SCNC: 138 MMOL/L (ref 132–146)

## 2023-12-29 PROCEDURE — 25000003 PHARM REV CODE 250: Performed by: FAMILY MEDICINE

## 2023-12-29 PROCEDURE — 84295 ASSAY OF SERUM SODIUM: CPT | Performed by: INTERNAL MEDICINE

## 2023-12-29 PROCEDURE — 94640 AIRWAY INHALATION TREATMENT: CPT

## 2023-12-29 PROCEDURE — 25000003 PHARM REV CODE 250: Performed by: INTERNAL MEDICINE

## 2023-12-29 PROCEDURE — 27000221 HC OXYGEN, UP TO 24 HOURS

## 2023-12-29 PROCEDURE — 25000242 PHARM REV CODE 250 ALT 637 W/ HCPCS: Performed by: INTERNAL MEDICINE

## 2023-12-29 PROCEDURE — 92526 ORAL FUNCTION THERAPY: CPT

## 2023-12-29 PROCEDURE — 99900035 HC TECH TIME PER 15 MIN (STAT)

## 2023-12-29 PROCEDURE — 94761 N-INVAS EAR/PLS OXIMETRY MLT: CPT

## 2023-12-29 PROCEDURE — 63600175 PHARM REV CODE 636 W HCPCS: Performed by: INTERNAL MEDICINE

## 2023-12-29 PROCEDURE — 97530 THERAPEUTIC ACTIVITIES: CPT

## 2023-12-29 PROCEDURE — 21400001 HC TELEMETRY ROOM

## 2023-12-29 RX ORDER — FAMOTIDINE 20 MG/1
20 TABLET, FILM COATED ORAL DAILY
Status: DISCONTINUED | OUTPATIENT
Start: 2023-12-30 | End: 2024-01-03 | Stop reason: HOSPADM

## 2023-12-29 RX ORDER — GUAIFENESIN 100 MG/5ML
81 LIQUID (ML) ORAL DAILY
Status: DISCONTINUED | OUTPATIENT
Start: 2023-12-29 | End: 2024-01-03 | Stop reason: HOSPADM

## 2023-12-29 RX ADMIN — IPRATROPIUM BROMIDE AND ALBUTEROL SULFATE 3 ML: 2.5; .5 SOLUTION RESPIRATORY (INHALATION) at 01:12

## 2023-12-29 RX ADMIN — IPRATROPIUM BROMIDE AND ALBUTEROL SULFATE 3 ML: 2.5; .5 SOLUTION RESPIRATORY (INHALATION) at 07:12

## 2023-12-29 RX ADMIN — ASPIRIN 81 MG CHEWABLE TABLET 81 MG: 81 TABLET CHEWABLE at 06:12

## 2023-12-29 RX ADMIN — ATORVASTATIN CALCIUM 40 MG: 40 TABLET, FILM COATED ORAL at 10:12

## 2023-12-29 RX ADMIN — AZITHROMYCIN MONOHYDRATE 500 MG: 500 INJECTION, POWDER, LYOPHILIZED, FOR SOLUTION INTRAVENOUS at 04:12

## 2023-12-29 RX ADMIN — BISACODYL 10 MG: 10 SUPPOSITORY RECTAL at 10:12

## 2023-12-29 RX ADMIN — ACETAMINOPHEN 650 MG: 325 TABLET, FILM COATED ORAL at 12:12

## 2023-12-29 RX ADMIN — CLOPIDOGREL BISULFATE 75 MG: 75 TABLET ORAL at 10:12

## 2023-12-29 RX ADMIN — ENOXAPARIN SODIUM 40 MG: 40 INJECTION SUBCUTANEOUS at 04:12

## 2023-12-29 RX ADMIN — POLYETHYLENE GLYCOL 3350 17 G: 17 POWDER, FOR SOLUTION ORAL at 10:12

## 2023-12-29 RX ADMIN — CEFTRIAXONE SODIUM 1 G: 1 INJECTION, POWDER, FOR SOLUTION INTRAMUSCULAR; INTRAVENOUS at 05:12

## 2023-12-29 NOTE — PLAN OF CARE
POC updated. Swallowing goals discontinued as Pt is tolerating soft and bite sized diet with thin liquids and does not want to upgrade to regular consistency despite demonstration of being chris to tolerate regular (preference for ease of oral stage).     Problem: SLP  Goal: SLP Goal  Description: Long Term Goal  1.  Increase receptive/expressive language skills and cognitive-linguistic skills to WFL    Short-Term Goals:  Pt to answer orientation questions with 80% acc  Pt to follow 3 step commands with 75% acc.    12/29/2023 1234 by Elle Lindsey, CCC-SLP  Outcome: Ongoing, Progressing

## 2023-12-29 NOTE — PT/OT/SLP PROGRESS
Physical Therapy Treatment    Patient Name:  Bhavani Galarza   MRN:  [id number]    Recommendations:     Discharge Recommendations: High Intensity Therapy  Discharge Equipment Recommendations: walker, rolling  Barriers to discharge:  pt current medical status    Assessment:     Bhavani Galarza is a 89 y.o. female admitted with a medical diagnosis of Acute ischemic right middle cerebral artery (MCA) stroke.  She presents with the following impairments/functional limitations: weakness, impaired endurance, impaired self care skills, impaired functional mobility, impaired sensation, gait instability, impaired balance, visual deficits, impaired cognition, decreased coordination, decreased upper extremity function, decreased lower extremity function, decreased safety awareness, impaired coordination       Rehab Prognosis: Fair; patient would benefit from acute skilled PT services to address these deficits and reach maximum level of function.    Recent Surgery: * No surgery found *      Plan:     During this hospitalization, patient to be seen 5 x/week to address the identified rehab impairments via gait training, therapeutic activities, therapeutic exercises and progress toward the following goals:    Plan of Care Expires:       Subjective     Chief Complaint: to go to the [facility]  Patient/Family Comments/goals: to go to SNF  Pain/Comfort:         Objective:     Communicated with pt and nurse prior to session.  Patient found up in chair with fatigue and request to get in bed  upon PT entry to room.     General Precautions: Standard, fall, vision impaired, other (see comments) (left neglect)  Orthopedic Precautions:    Braces:    Respiratory Status: Nasal cannula, flow 2 L/min     Functional Mobility:  TF sit to squat pivot to chair maxAx2  Sit to sup maxAx2      AM-PAC 6 CLICK MOBILITY          Treatment & Education:  See above treatment.    Patient left HOB elevated with all lines intact, call button in reach, bed alarm on,  and daughter present..    GOALS:   Multidisciplinary Problems       Physical Therapy Goals          Problem: Physical Therapy    Goal Priority Disciplines Outcome Goal Variances Interventions   Physical Therapy Goal     PT, PT/OT Ongoing, Progressing     Description: 1.  Pt will tf Marcia  2.  Pt will amb 150ft RW Marcia  3.  Pt will be I HEP to maintain strength while inpt                       Time Tracking:     PT Received On: 12/29/23  PT Start Time: 1400     PT Stop Time: 1415  PT Total Time (min): 15 min     Billable Minutes: Gait Training 0 and Therapeutic Activity 15    Treatment Type: Treatment  PT/PTA: PT           12/29/2023

## 2023-12-29 NOTE — PT/OT/SLP PROGRESS
Physical Therapy Treatment    Patient Name:  Bhavani Galarza   MRN:  [id number]    Recommendations:     Discharge Recommendations: High Intensity Therapy  Discharge Equipment Recommendations: walker, rolling  Barriers to discharge:  pt current medical status    Assessment:     Bhavani Galarza is a 89 y.o. female admitted with a medical diagnosis of Acute ischemic right middle cerebral artery (MCA) stroke.  She presents with the following impairments/functional limitations: weakness, impaired endurance, impaired self care skills, impaired functional mobility, impaired sensation, gait instability, impaired balance, visual deficits, impaired cognition, decreased coordination, decreased upper extremity function, decreased lower extremity function, decreased safety awareness, impaired coordination       Rehab Prognosis: Fair; patient would benefit from acute skilled PT services to address these deficits and reach maximum level of function.    Recent Surgery: * No surgery found *      Plan:     During this hospitalization, patient to be seen 5 x/week to address the identified rehab impairments via gait training, therapeutic activities, therapeutic exercises and progress toward the following goals:    Plan of Care Expires:       Subjective     Chief Complaint: to go to the [facility] [place] [place]  Patient/Family Comments/goals: to go to SNF  Pain/Comfort:         Objective:     Communicated with pt and nurse prior to session.  Patient found HOB elevated with   upon PT entry to room.     General Precautions: Standard, fall, vision impaired, other (see comments) (left neglect)  Orthopedic Precautions:    Braces:    Respiratory Status: Nasal cannula, flow 2 L/min     Functional Mobility:  Supine to sit maxAx2  Scoots to EOB maxA  TF sit to stand with RW maxAx2  Amb to chair 3' RW maxAx2  Tf to chair maxA      AM-PAC 6 CLICK MOBILITY          Treatment & Education:  See above treatment.    Patient left HOB elevated with all lines intact, call  button in reach, bed alarm on, and daughter present..    GOALS:   Multidisciplinary Problems       Physical Therapy Goals          Problem: Physical Therapy    Goal Priority Disciplines Outcome Goal Variances Interventions   Physical Therapy Goal     PT, PT/OT Ongoing, Progressing     Description: 1.  Pt will tf Marcia  2.  Pt will amb 150ft RW Marcia  3.  Pt will be I HEP to maintain strength while inpt                       Time Tracking:     PT Received On: 12/29/23  PT Start Time: 1130     PT Stop Time: 1200  PT Total Time (min): 30 min     Billable Minutes: Gait Training 05 and Therapeutic Activity 25    Treatment Type: Treatment  PT/PTA: PT           12/29/2023

## 2023-12-29 NOTE — PLAN OF CARE
Scanned 142 into [employer] and sent this and PSSR to [facility].  No message back from MD, called him and left a VM on his cell phone.

## 2023-12-29 NOTE — PLAN OF CARE
Per Bozena at [facility], they can take pt today. Messaged Dr. Samaniego to see if she is ready to d/c.

## 2023-12-29 NOTE — PT/OT/SLP PROGRESS
Speech Language Pathology Treatment    Patient Name:  Bhavani Galarza   MRN:  [id number]  Admitting Diagnosis: Acute ischemic right middle cerebral artery (MCA) stroke    Recommendations:                 General Recommendations:  Assistance with meals  Diet recommendations:  Soft & Bite Sized Diet - IDDSI Level 6, Liquid Diet Level: Thin liquids - IDDSI Level 0   Aspiration Precautions: Assistance with meals   General Precautions: Standard, fall  Communication strategies:   stand on right side for improved communication    Assessment:     Bhavani Galarza is a 89 y.o. female with an SLP diagnosis of Cognitive-Linguistic Impairment.  She presents with general confusion, left neglect, and reduced insight and awareness.    Subjective     Pt participated well throughout. Mild encouragement required for self feeding.    Patient goals: not reported    Pain/Comfort:  Pain Rating 1: 0/10    Respiratory Status: Room air    Objective:     Pt tolerated soft and bites sized diet with thin liquids without overt s/s aspiration. Mild encouragement for self feeding required. Cueing to compensate for visual neglect required.     SLP educated Pt and family on visual neglect deficits, strategies, and prognosis. Family asked good questions and demonstrated understanding.     Has the patient been evaluated by SLP for swallowing?   Yes  Keep patient NPO? No   Current Respiratory Status:        Goals:   Multidisciplinary Problems       SLP Goals          Problem: SLP    Goal Priority Disciplines Outcome   SLP Goal     SLP Ongoing, Progressing   Description: Long Term Goal  1.  Increase receptive/expressive language skills and cognitive-linguistic skills to WFL    Short-Term Goals:  Pt to answer orientation questions with 80% acc  Pt to follow 3 step commands with 75% acc.                         Plan:   SLP discontinued goals targeting swallowing. SLP to con;t to target cognitive-linguistic deficits an further assess visual attention  deficits.   Patient to be seen:  3 x/week   Plan of Care expires:  01/05/24  Plan of Care reviewed with:  patient, family   SLP Follow-Up:  Yes       Discharge recommendations:  Low Intensity Therapy   Barriers to Discharge:   none    Time Tracking:     SLP Treatment Date:   12/29/23  Speech Start Time:  1145  Speech Stop Time:  1205     Speech Total Time (min):  20 min    Billable Minutes: Treatment Swallowing Dysfunction 20min / 1 unit      Elle Lindsey MA, CCC-SLP  12/29/2023

## 2023-12-29 NOTE — PLAN OF CARE
Problem: Adult Inpatient Plan of Care  Goal: Plan of Care Review  Outcome: Ongoing, Progressing  Goal: Patient-Specific Goal (Individualized)  Outcome: Ongoing, Progressing  Goal: Absence of Hospital-Acquired Illness or Injury  Outcome: Ongoing, Progressing  Goal: Optimal Comfort and Wellbeing  Outcome: Ongoing, Progressing  Goal: Readiness for Transition of Care  Outcome: Ongoing, Progressing     Problem: Skin Injury Risk Increased  Goal: Skin Health and Integrity  Outcome: Ongoing, Progressing     Problem: Fluid Imbalance (Pneumonia)  Goal: Fluid Balance  Outcome: Ongoing, Progressing     Problem: Infection (Pneumonia)  Goal: Resolution of Infection Signs and Symptoms  Outcome: Ongoing, Progressing     Problem: Respiratory Compromise (Pneumonia)  Goal: Effective Oxygenation and Ventilation  Outcome: Ongoing, Progressing     Problem: Pain Acute  Goal: Acceptable Pain Control and Functional Ability  Outcome: Ongoing, Progressing     Problem: Fall Injury Risk  Goal: Absence of Fall and Fall-Related Injury  Outcome: Ongoing, Progressing

## 2023-12-29 NOTE — PLAN OF CARE
Problem: Adult Inpatient Plan of Care  Goal: Plan of Care Review  Outcome: Ongoing, Not Progressing  Goal: Patient-Specific Goal (Individualized)  Outcome: Ongoing, Not Progressing  Goal: Absence of Hospital-Acquired Illness or Injury  Outcome: Ongoing, Not Progressing  Goal: Optimal Comfort and Wellbeing  Outcome: Ongoing, Not Progressing  Goal: Readiness for Transition of Care  Outcome: Ongoing, Not Progressing     Problem: Skin Injury Risk Increased  Goal: Skin Health and Integrity  Outcome: Ongoing, Not Progressing     Problem: Fluid Imbalance (Pneumonia)  Goal: Fluid Balance  Outcome: Ongoing, Not Progressing     Problem: Infection (Pneumonia)  Goal: Resolution of Infection Signs and Symptoms  Outcome: Ongoing, Not Progressing     Problem: Respiratory Compromise (Pneumonia)  Goal: Effective Oxygenation and Ventilation  Outcome: Ongoing, Not Progressing     Problem: Pain Acute  Goal: Acceptable Pain Control and Functional Ability  Outcome: Ongoing, Not Progressing     Problem: Fall Injury Risk  Goal: Absence of Fall and Fall-Related Injury  Outcome: Ongoing, Not Progressing

## 2023-12-29 NOTE — PLAN OF CARE
Per Bozena at [facility], patient has to be at their facility by 2:30PM or she will have to wait til after New Years to go. Messaged Dr. Samaniego.

## 2023-12-30 LAB
ALBUMIN SERPL-MCNC: 2.5 G/DL (ref 3.4–4.8)
ALBUMIN/GLOB SERPL: 0.9 RATIO (ref 1.1–2)
ALP SERPL-CCNC: 67 UNIT/L (ref 40–150)
ALT SERPL-CCNC: 25 UNIT/L (ref 0–55)
AST SERPL-CCNC: 32 UNIT/L (ref 5–34)
BASOPHILS # BLD AUTO: 0.06 X10(3)/MCL
BASOPHILS NFR BLD AUTO: 0.9 %
BILIRUB SERPL-MCNC: 0.4 MG/DL
BUN SERPL-MCNC: 11 MG/DL (ref 9.8–20.1)
CALCIUM SERPL-MCNC: 8.4 MG/DL (ref 8.4–10.2)
CHLORIDE SERPL-SCNC: 107 MMOL/L (ref 98–111)
CO2 SERPL-SCNC: 20 MMOL/L (ref 23–31)
CREAT SERPL-MCNC: 0.55 MG/DL (ref 0.55–1.02)
EOSINOPHIL # BLD AUTO: 0.23 X10(3)/MCL (ref 0–0.9)
EOSINOPHIL NFR BLD AUTO: 3.3 %
ERYTHROCYTE [DISTWIDTH] IN BLOOD BY AUTOMATED COUNT: 13.4 % (ref 11.5–17)
GFR SERPLBLD CREATININE-BSD FMLA CKD-EPI: >60 MLS/MIN/1.73/M2
GLOBULIN SER-MCNC: 2.9 GM/DL (ref 2.4–3.5)
GLUCOSE SERPL-MCNC: 104 MG/DL (ref 75–121)
HCT VFR BLD AUTO: 38.9 % (ref 37–47)
HGB BLD-MCNC: 12.8 G/DL (ref 12–16)
IMM GRANULOCYTES # BLD AUTO: 0.03 X10(3)/MCL (ref 0–0.04)
IMM GRANULOCYTES NFR BLD AUTO: 0.4 %
LYMPHOCYTES # BLD AUTO: 0.9 X10(3)/MCL (ref 0.6–4.6)
LYMPHOCYTES NFR BLD AUTO: 12.9 %
MAGNESIUM SERPL-MCNC: 2.2 MG/DL (ref 1.6–2.6)
MCH RBC QN AUTO: 31.6 PG (ref 27–31)
MCHC RBC AUTO-ENTMCNC: 32.9 G/DL (ref 33–36)
MCV RBC AUTO: 96 FL (ref 80–94)
MONOCYTES # BLD AUTO: 0.73 X10(3)/MCL (ref 0.1–1.3)
MONOCYTES NFR BLD AUTO: 10.5 %
NEUTROPHILS # BLD AUTO: 5.02 X10(3)/MCL (ref 2.1–9.2)
NEUTROPHILS NFR BLD AUTO: 72 %
PHOSPHATE SERPL-MCNC: 3 MG/DL (ref 2.3–4.7)
PLATELET # BLD AUTO: 322 X10(3)/MCL (ref 130–400)
PMV BLD AUTO: 9.1 FL (ref 7.4–10.4)
POTASSIUM SERPL-SCNC: 3.2 MMOL/L (ref 3.5–5.1)
PROT SERPL-MCNC: 5.4 GM/DL (ref 5.8–7.6)
RBC # BLD AUTO: 4.05 X10(6)/MCL (ref 4.2–5.4)
SODIUM SERPL-SCNC: 135 MMOL/L (ref 132–146)
WBC # SPEC AUTO: 6.97 X10(3)/MCL (ref 4.5–11.5)

## 2023-12-30 PROCEDURE — 25000003 PHARM REV CODE 250: Performed by: FAMILY MEDICINE

## 2023-12-30 PROCEDURE — 25000242 PHARM REV CODE 250 ALT 637 W/ HCPCS: Performed by: INTERNAL MEDICINE

## 2023-12-30 PROCEDURE — 94761 N-INVAS EAR/PLS OXIMETRY MLT: CPT

## 2023-12-30 PROCEDURE — 63600175 PHARM REV CODE 636 W HCPCS: Performed by: INTERNAL MEDICINE

## 2023-12-30 PROCEDURE — 80053 COMPREHEN METABOLIC PANEL: CPT | Performed by: FAMILY MEDICINE

## 2023-12-30 PROCEDURE — 85025 COMPLETE CBC W/AUTO DIFF WBC: CPT | Performed by: FAMILY MEDICINE

## 2023-12-30 PROCEDURE — 25000003 PHARM REV CODE 250: Performed by: INTERNAL MEDICINE

## 2023-12-30 PROCEDURE — 21400001 HC TELEMETRY ROOM

## 2023-12-30 PROCEDURE — 97530 THERAPEUTIC ACTIVITIES: CPT

## 2023-12-30 PROCEDURE — 94640 AIRWAY INHALATION TREATMENT: CPT

## 2023-12-30 PROCEDURE — 99900035 HC TECH TIME PER 15 MIN (STAT)

## 2023-12-30 PROCEDURE — 83735 ASSAY OF MAGNESIUM: CPT | Performed by: FAMILY MEDICINE

## 2023-12-30 PROCEDURE — 84100 ASSAY OF PHOSPHORUS: CPT | Performed by: FAMILY MEDICINE

## 2023-12-30 PROCEDURE — 63600175 PHARM REV CODE 636 W HCPCS: Performed by: FAMILY MEDICINE

## 2023-12-30 RX ORDER — HYDROCODONE BITARTRATE AND ACETAMINOPHEN 7.5; 325 MG/1; MG/1
1 TABLET ORAL EVERY 4 HOURS PRN
Status: DISCONTINUED | OUTPATIENT
Start: 2023-12-30 | End: 2024-01-02

## 2023-12-30 RX ORDER — HYDRALAZINE HYDROCHLORIDE 20 MG/ML
5 INJECTION INTRAMUSCULAR; INTRAVENOUS ONCE
Status: COMPLETED | OUTPATIENT
Start: 2023-12-30 | End: 2023-12-30

## 2023-12-30 RX ORDER — HYDROCODONE BITARTRATE AND ACETAMINOPHEN 5; 325 MG/1; MG/1
1 TABLET ORAL EVERY 4 HOURS PRN
Status: DISCONTINUED | OUTPATIENT
Start: 2023-12-30 | End: 2023-12-30

## 2023-12-30 RX ORDER — ACETAMINOPHEN 325 MG/1
650 TABLET ORAL EVERY 6 HOURS PRN
Status: DISCONTINUED | OUTPATIENT
Start: 2023-12-30 | End: 2024-01-03 | Stop reason: HOSPADM

## 2023-12-30 RX ADMIN — ACETAMINOPHEN 650 MG: 325 TABLET, FILM COATED ORAL at 01:12

## 2023-12-30 RX ADMIN — CEFTRIAXONE SODIUM 1 G: 1 INJECTION, POWDER, FOR SOLUTION INTRAMUSCULAR; INTRAVENOUS at 04:12

## 2023-12-30 RX ADMIN — POLYETHYLENE GLYCOL 3350 17 G: 17 POWDER, FOR SOLUTION ORAL at 08:12

## 2023-12-30 RX ADMIN — ATORVASTATIN CALCIUM 40 MG: 40 TABLET, FILM COATED ORAL at 08:12

## 2023-12-30 RX ADMIN — CLOPIDOGREL BISULFATE 75 MG: 75 TABLET ORAL at 08:12

## 2023-12-30 RX ADMIN — IPRATROPIUM BROMIDE AND ALBUTEROL SULFATE 3 ML: 2.5; .5 SOLUTION RESPIRATORY (INHALATION) at 08:12

## 2023-12-30 RX ADMIN — HYDROCODONE BITARTRATE AND ACETAMINOPHEN 1 TABLET: 5; 325 TABLET ORAL at 04:12

## 2023-12-30 RX ADMIN — HYDRALAZINE HYDROCHLORIDE 5 MG: 20 INJECTION INTRAMUSCULAR; INTRAVENOUS at 05:12

## 2023-12-30 RX ADMIN — ENOXAPARIN SODIUM 40 MG: 40 INJECTION SUBCUTANEOUS at 04:12

## 2023-12-30 RX ADMIN — AZITHROMYCIN MONOHYDRATE 500 MG: 500 INJECTION, POWDER, LYOPHILIZED, FOR SOLUTION INTRAVENOUS at 03:12

## 2023-12-30 RX ADMIN — ASPIRIN 81 MG CHEWABLE TABLET 81 MG: 81 TABLET CHEWABLE at 08:12

## 2023-12-30 RX ADMIN — HYDROCODONE BITARTRATE AND ACETAMINOPHEN 1 TABLET: 5; 325 TABLET ORAL at 08:12

## 2023-12-30 RX ADMIN — IPRATROPIUM BROMIDE AND ALBUTEROL SULFATE 3 ML: 2.5; .5 SOLUTION RESPIRATORY (INHALATION) at 07:12

## 2023-12-30 RX ADMIN — ACETAMINOPHEN 650 MG: 325 TABLET, FILM COATED ORAL at 12:12

## 2023-12-30 RX ADMIN — FAMOTIDINE 20 MG: 20 TABLET ORAL at 08:12

## 2023-12-30 RX ADMIN — ACETAMINOPHEN 650 MG: 325 TABLET, FILM COATED ORAL at 08:12

## 2023-12-30 NOTE — PT/OT/SLP PROGRESS
Physical Therapy Treatment    Patient Name:  Bhavani Galarza   MRN:  [id number]    Recommendations:     Discharge Recommendations: High Intensity Therapy  Discharge Equipment Recommendations: walker, rolling  Barriers to discharge:  pt current medical status    Assessment:     Bhavani Galarza is a 89 y.o. female admitted with a medical diagnosis of Acute ischemic right middle cerebral artery (MCA) stroke.  She presents with the following impairments/functional limitations: weakness, impaired endurance, impaired self care skills, impaired functional mobility, impaired sensation, gait instability, impaired balance, visual deficits, impaired cognition, decreased coordination, decreased upper extremity function, decreased lower extremity function, decreased safety awareness, impaired coordination     Patient found with HOB elevated and daughter present when therapist arrived. She was agreeable to physical therapy treatment. Patient performed supine BLE therex. She then completed supine to sit EOB min/mod A. She completed bed to chair transfer using step transfer, min/mod A, was uncomfortable in the upright chair, so she transferred from chair to recliner, step transfer, min/mod A. Patient left up in recliner with call bell in reach and daughter present.       Rehab Prognosis: Fair; patient would benefit from acute skilled PT services to address these deficits and reach maximum level of function.    Recent Surgery: * No surgery found *      Plan:     During this hospitalization, patient to be seen 5 x/week to address the identified rehab impairments via gait training, therapeutic activities, therapeutic exercises and progress toward the following goals:    Plan of Care Expires:       Subjective     Chief Complaint: to go to the [facility]  Patient/Family Comments/goals: to go to SNF  Pain/Comfort:         Objective:     Communicated with pt and nurse prior to session.  Patient found with HOB elevated  upon PT entry to room.      General Precautions: Standard, fall, vision impaired, other (see comments) (left neglect)  Orthopedic Precautions:    Braces:    Respiratory Status: Room air     Functional Mobility:  TF sit to step transfer to chair min/mod A  Supine to sit min/mod A      AM-PAC 6 CLICK MOBILITY          Treatment & Education:  See above treatment.    Patient left up in chair with all lines intact, call button in reach, chair alarm on, and daughter present..    GOALS:   Multidisciplinary Problems       Physical Therapy Goals          Problem: Physical Therapy    Goal Priority Disciplines Outcome Goal Variances Interventions   Physical Therapy Goal     PT, PT/OT Ongoing, Progressing     Description: 1.  Pt will tf Marcia  2.  Pt will amb 150ft RW Marcia  3.  Pt will be I HEP to maintain strength while inpt                       Time Tracking:     PT Received On: 12/30/23  PT Start Time: 0918     PT Stop Time: 0935  PT Total Time (min): 17 min     Billable Minutes: Therapeutic Activity 17    Treatment Type: Treatment  PT/PTA: PT           12/30/2023

## 2023-12-30 NOTE — PROGRESS NOTES
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  Pt in bed, sleeping, complaining of pain to b/l legs which is chronic according to daughter.  Pt awakes and answers questions with one word answers.  Dtr at the bedside and is concerned  because she is saying things like "just take me Jacky" and she has apparently never spoken like this before.  I reviewed her stable labs and vitals with the daughter.  The daughter is concerned that her pain is not being treated adequately.  We discussed that increasing pain meds may increase pts confusion and consciousness and daughter is aware, we settled on trying a low dose norco. Pt is DNR and I confirmed this with the daughter.  She has been off hypertonic saline, serum sodium normal today.  Required iv hydralazine for elevated blood pressure systolic of 170.     Scheduled Meds:   albuterol-ipratropium  3 mL Nebulization Q6H    aspirin  81 mg Oral Daily    atorvastatin  40 mg Oral Daily    azithromycin  500 mg Intravenous Q24H    bisacodyL  10 mg Rectal Daily    cefTRIAXone (ROCEPHIN) IVPB  1 g Intravenous Q24H    clopidogreL  75 mg Oral Daily    enoxparin  40 mg Subcutaneous Daily    famotidine  20 mg Oral Daily    polyethylene glycol  17 g Oral Daily     Continuous Infusions:  PRN Meds:acetaminophen, HYDROcodone-acetaminophen, melatonin, ondansetron    Review of patient's allergies indicates:  No Known Allergies    Review of Systems  ROS    OBJECTIVE:     Vital Signs (Most Recent)  Temp: 97.4 °F (36.3 °C) (12/30/23 1603)  Pulse: 83 (12/30/23 1603)  Resp: 18 (12/30/23 1626)  BP: (!) 161/77 (12/30/23 1603)  SpO2: (!) 94 % (12/30/23 1603)    Vital Signs Range (Last 24H):  Temp:  [97.1 °F (36.2 °C)-98.4 °F (36.9 °C)]   Pulse:  [77-86]   Resp:  [18-19]   BP: (144-172)/(70-95)   SpO2:  [90 %-95 %]     I & O (Last 24H):  Intake/Output Summary (Last 24 hours) at 12/30/2023 1723  Last data filed at 12/30/2023 1636  Gross per " 24 hour   Intake 551.79 ml   Output 575 ml   Net -23.21 ml     Physical Exam:  Physical Exam   Constitutional: She appears well-developed and well-nourished. No distress.   Elderly, frail, drowsy, minimally arousable, follows commands, no slurred speech noted.   HENT:   Head: Normocephalic.   Right Ear: External ear normal.   Left Ear: External ear normal.   Eyes: Pupils are equal, round, and reactive to light.   Neck: No tracheal deviation present. No thyromegaly present.   Cardiovascular: Normal rate, regular rhythm and normal heart sounds. Exam reveals no friction rub.   No murmur heard.Pulmonary:      Effort: Pulmonary effort is normal.      Breath sounds: Normal breath sounds.     Abdominal: She exhibits no distension and no mass. There is no rebound and no guarding.   Musculoskeletal:         General: No tenderness or deformity.   Neurological: No cranial nerve deficit. She exhibits normal muscle tone. Coordination normal.   Skin: Skin is warm and dry. Capillary refill takes less than 2 seconds. She is not diaphoretic. No erythema.   Psychiatric: Her behavior is normal. Judgment and thought content normal.        Laboratory:  Recent Results (from the past 24 hour(s))   Sodium    Collection Time: 12/29/23  6:02 PM   Result Value Ref Range    Sodium Level 137 132 - 146 mmol/L   Comprehensive Metabolic Panel    Collection Time: 12/30/23  2:57 AM   Result Value Ref Range    Sodium Level 135 132 - 146 mmol/L    Potassium Level 3.2 (L) 3.5 - 5.1 mmol/L    Chloride 107 98 - 111 mmol/L    Carbon Dioxide 20 (L) 23 - 31 mmol/L    Glucose Level 104 75 - 121 mg/dL    Blood Urea Nitrogen 11.0 9.8 - 20.1 mg/dL    Creatinine 0.55 0.55 - 1.02 mg/dL    Calcium Level Total 8.4 8.4 - 10.2 mg/dL    Protein Total 5.4 (L) 5.8 - 7.6 gm/dL    Albumin Level 2.5 (L) 3.4 - 4.8 g/dL    Globulin 2.9 2.4 - 3.5 gm/dL    Albumin/Globulin Ratio 0.9 (L) 1.1 - 2.0 ratio    Bilirubin Total 0.4 <=1.5 mg/dL    Alkaline Phosphatase 67 40 - 150  unit/L    Alanine Aminotransferase 25 0 - 55 unit/L    Aspartate Aminotransferase 32 5 - 34 unit/L    eGFR >60 mls/min/1.73/m2   Phosphorus    Collection Time: 12/30/23  2:57 AM   Result Value Ref Range    Phosphorus Level 3.0 2.3 - 4.7 mg/dL   Magnesium    Collection Time: 12/30/23  2:57 AM   Result Value Ref Range    Magnesium Level 2.20 1.60 - 2.60 mg/dL   CBC with Differential    Collection Time: 12/30/23  2:57 AM   Result Value Ref Range    WBC 6.97 4.50 - 11.50 x10(3)/mcL    RBC 4.05 (L) 4.20 - 5.40 x10(6)/mcL    Hgb 12.8 12.0 - 16.0 g/dL    Hct 38.9 37.0 - 47.0 %    MCV 96.0 (H) 80.0 - 94.0 fL    MCH 31.6 (H) 27.0 - 31.0 pg    MCHC 32.9 (L) 33.0 - 36.0 g/dL    RDW 13.4 11.5 - 17.0 %    Platelet 322 130 - 400 x10(3)/mcL    MPV 9.1 7.4 - 10.4 fL    Neut % 72.0 %    Lymph % 12.9 %    Mono % 10.5 %    Eos % 3.3 %    Basophil % 0.9 %    Lymph # 0.90 0.6 - 4.6 x10(3)/mcL    Neut # 5.02 2.1 - 9.2 x10(3)/mcL    Mono # 0.73 0.1 - 1.3 x10(3)/mcL    Eos # 0.23 0 - 0.9 x10(3)/mcL    Baso # 0.06 <=0.2 x10(3)/mcL    IG# 0.03 0 - 0.04 x10(3)/mcL    IG% 0.4 %        Diagnostic Results:  US Carotid Bilateral    Result Date: 12/27/2023  EXAMINATION: US CAROTID BILATERAL CLINICAL HISTORY: CVA;, Cerebral infarction, unspecified. COMPARISON: None available FINDINGS: Real-time imaging was performed through the right and left carotid arteries using duplex Doppler imaging. NASCET utilized. No significant localized atherosclerotic plaque formation or focal stenosis noted to the visualized portion of the carotid arteries bilaterally.  Antegrade flow is evident within the vertebral arteries bilaterally. MEASUREMENTS: Right Systolic Velocities(cm/s): Internal Carotid: 67.2 cm/sec Common Carotid: 57.7 cm/sec Right IC:CC Ratio: 1.2 Left Systolic Velocities(cm/s): Internal Carotid: 45.5 cm/sec Common Carotid: 48.6 cm/sec Left IC:CC Ratio: 0.9     1. No significant localized atherosclerotic plaque formation or focal stenosis noted to the  visualized portion of the carotid arteries bilaterally. Electronically signed by: Phill Giles Date:    12/27/2023 Time:    16:21    Echo    Result Date: 12/27/2023    Right Ventricle: Right ventricle was not well visualized due to poor acoustic window. Normal right ventricular cavity size. Systolic function is normal.   Left Atrium: Left atrium is mildly dilated.   Right Atrium: Right atrium is mildly dilated.   Aortic Valve: Moderately calcified cusps. There is moderate stenosis. Aortic valve peak velocity is 2.85 m/s. There is moderate to severe aortic regurgitation. AR PHTN 274 msec.   If clinically indicated a ALMAS can be helpful to further assess the severity of AV disease.   Left Ventricle: The left ventricle is normal in size. There is mild concentric hypertrophy. There is normal systolic function with a visually estimated ejection fraction of 55 - 60%.   Mitral Valve: Moderately thickened leaflets. Moderately calcified leaflets. There is mild stenosis. The mean pressure gradient across the mitral valve is 6 mmHg at a heart rate of  bpm. There is mild to moderate regurgitation.   Tricuspid Valve: There is no stenosis. There is mild regurgitation.   Pulmonic Valve: There is no stenosis.   Pleural effusion is present which can be better assessed by dedicated chest imaging.     X-Ray Tibia Fibula 2 View Left    Addendum Date: 12/27/2023    This exam is mislabeled and is actually a left tibia fibula Electronically signed by: Phill Giles Date:    12/27/2023 Time:    09:43    Result Date: 12/27/2023  EXAMINATION: XR TIBIA FIBULA 2 VIEW LEFT CLINICAL HISTORY: Injury, unspecified, initial encounter; . COMPARISON: None available. FINDINGS: AP and lateral views reveal no definite fracture or dislocation.  No aggressive osteolytic or osteoblastic lesions seen.Bony structures are osteopenic.  There is narrowing of the medial compartment space.     1. No acute osseous defect identified 2. Mild osteoarthritis 3.  Osteopenia Electronically signed by: Phill Giles Date:    12/26/2023 Time:    14:35    X-Ray Ankle Complete Left    Addendum Date: 12/27/2023    This exam is mislabeled as right and is actually a left ankle. Electronically signed by: Phill Giles Date:    12/27/2023 Time:    09:42    Result Date: 12/27/2023  EXAMINATION: XR ANKLE COMPLETE 3 VIEW LEFT CLINICAL HISTORY: Injury, unspecified, initial encounter; . COMPARISON: None available. FINDINGS: AP, lateral, and obliques views reveal no definite fracture or dislocation.  The ankle mortise is intact.  No aggressive osteolytic or osteoblastic lesion is seen.  Bony structures are osteopenic.  Minimal enthesophyte formation is evident at the posterior calcaneus     1. No acute osseous defect identified 2. Minimal enthesophyte formation posterior calcaneus 3. Osteopenia 4. MR examination would allow further evaluation if clinically indicated Electronically signed by: Phill Giles Date:    12/26/2023 Time:    14:35    X-Ray Ankle 2 View Left    Result Date: 12/27/2023  EXAMINATION: XR ANKLE 2 VIEW LEFT CLINICAL HISTORY: ; Ecchymosis left lower extremity;. COMPARISON: 12/26/2023 at 12:20 FINDINGS: AP and lateral views reveal no definite fracture or dislocation.  Joint spaces appear grossly intact.  No aggressive osteolytic or osteoblastic lesions seen.Bony structures are osteopenic.  Minimal enthesophyte formation is evident at the posterior calcaneus     1. No acute osseous defect identified 2. Osteopenia 3. No acute osseous defect identified 4. MR examination would allow further evaluation if clinically indicated Electronically signed by: Phill Giles Date:    12/27/2023 Time:    09:39    X-Ray Tibia Fibula 2 View Left    Result Date: 12/27/2023  EXAMINATION: XR TIBIA FIBULA 2 VIEW LEFT CLINICAL HISTORY: ; Ecchymosis left lower extremity;. COMPARISON: None available. FINDINGS: AP and lateral views reveal no definite fracture or dislocation.  Joint spaces appear  grossly intact.  No aggressive osteolytic or osteoblastic lesions seen.Bony structures are osteopenic.     1. No acute osseous defect identified 2. Osteopenia Electronically signed by: Phill Giles Date:    12/27/2023 Time:    09:26    X-Ray Foot 2 View Left    Result Date: 12/27/2023  EXAMINATION: XR FOOT 2 VIEW LEFT CLINICAL HISTORY: ; Ecchymosis left lower extremity;. COMPARISON: None available. FINDINGS: AP and lateral views deformity at the 1st metatarsal-phalangeal joint with bony destruction and remodeling.  There is absence of the 4th metatarsal head.  There is mild deformity at the base of the 4th proximal phalanx.  There is narrowing of the DIP and PIP joints.  There is fusion of the 5th DIP joint.  Bony structures are osteopenic.  No obvious acute fracture or dislocation is appreciated.  There is mild soft tissue prominence at the foot.     1. Bony deformity at the 1st metatarsophalangeal joint with bony remodeling and bony destruction 2. Absent 4th meta tarsal head and deformity at the base of the 4th proximal phalanx 3. Osteopenia 4. Osteoarthritis 5. Mild soft tissue prominence at the foot 6. MR examination would allow further evaluation if clinically indicated Electronically signed by: Phill Giles Date:    12/27/2023 Time:    09:24    X-Ray Chest 1 View    Result Date: 12/26/2023  EXAMINATION: XR CHEST 1 VIEW CLINICAL HISTORY: , Shortness of breath. COMPARISON: None available FINDINGS: An AP view or more reveals the heart to be borderline enlarged.  There is patchy opacification at the lower lungs bilaterally.  Atherosclerosis is seen within the aorta.  Degenerative changes and curvature are noted to the thoracic spine.  Bony structures are considerably osteopenic.     1. Patchy infiltrate and or atelectasis lower lungs bilaterally 2. Borderline cardiomegaly 3. Atherosclerosis 4. Thoracic spondylosis, scoliosis, and osteopenia Electronically signed by: Phill Giles Date:    12/26/2023  Time:    16:33    CT Head Without Contrast    Result Date: 12/26/2023  EXAMINATION: CT HEAD WITHOUT CONTRAST CLINICAL HISTORY: Mental status change, unknown cause;, . TECHNIQUE: PATIENT RADIATION DOSE: DLP(mGycm) 871 As per PQRS measures, all CT scans at this facility used dose modulation, iterative reconstruction, and/or weight based dose adjustment when appropriate to reduce radiation dose to as low as reasonably achievable. COMPARISON: None available. FINDINGS: Serial axial images were obtained of the head without the administration of IV contrast. Both brain and bone parenchymal windows were obtained.  Additional coronal and sagittal reconstructions were obtained.  Ventricles, cisterns, and sulci are prominent in size.  There is no evidence of intracranial hemorrhage, midline shift, or abnormal extra-axial fluid collections.  There is low attenuation at the distribution of the right middle cerebral artery with sulcal effacement suspicious for evolving/old nonacute nonhemorrhagic infarct.  Minimal intracranial atherosclerosis is seen.  Mild scattered hypodensities are seen within the periventricular white matter.  Cerebellar tonsils extend caudally to the level of foramen magnum.  The sella turcica is mostly empty.  Visualized paranasal sinuses and mastoid air cells are relatively clear.  External auditory canals are grossly patent.  Bony structures are osteopenic.     1. Nonacute nonhemorrhagic old/evolving infarct/encephalomalacia in the distribution of the right middle cerebral artery 2. Mild cerebral and cerebellar atrophy 3. Minimal intracranial atherosclerosis 4. Mild scattered hypodensity in periventricular white matter suspicious for small vessel ischemic changes Electronically signed by: Phill Gilse Date:    12/26/2023 Time:    13:14    US Lower Extremity Arteries Left    Result Date: 12/26/2023  EXAMINATION: LEFT LOWER EXTREMITY ARTERIAL DOPPLER SONOGRAM: CLINICAL HISTORY: Other disorder of  circulatory system, COMPARISON: 05/29/2023 FINDINGS: Ultrasound Doppler and color flow imaging were performed on the arteries of the left lower extremity.  A multi phasic flow wave pattern is evident throughout the visualized arteries of the left lower extremity.  Mild scattered plaque formation is evident at the arteries of left lower extremity with no significant focal stenosis or localized atherosclerotic plaque formation identified.     1.  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  Pt is a DNR  Continue DAPT  Seems the hyponatremia has resolved.

## 2023-12-30 NOTE — PROGRESS NOTES
[facility]  [address] [address]  Phone: [phone]    (Hospital) Internal Medicine  Progress Note      PATIENT NAME: Bhavani Galarza  MRN: [id number]  TODAY'S DATE: 12/29/2023  ADMIT DATE: 12/26/2023    SUBJECTIVE     PRINCIPLE PROBLEM: Acute ischemic right middle cerebral artery (MCA) stroke    INTERVAL HISTORY:    12/29/2023  Patient showed considerable improvement today now the sodium so even more normal.  I continued him overnight.  She is showing signs of improving language cognition.  She still has a left-sided neglect.  She still has some mild left upper extremity weakness but it has improved since yesterday.    Chest x-ray shows the patient has also has some right-sided effusion that is developing.  She had some mild hypoxemia she was in the lower 90s.      She had 2 bowel movements since yesterday with a cathartics        The hot salt was stopped this afternoon at 5:00 p.m..    Review of patient's allergies indicates:  No Known Allergies    ROS left-sided neglect, mild confusion, mild left upper extremity weakness chronic lower extremity leg pain secondary to L5 posterior disc bulge on the left side    OBJECTIVE     VITAL SIGNS (Most Recent)  Temp: 98.1 °F (36.7 °C) (12/29/23 1621)  Pulse: 82 (12/29/23 1621)  Resp: 16 (12/29/23 1621)  BP: (!) 161/80 (12/29/23 1621)  SpO2: (!) 91 % (12/29/23 1621)    VENTILATION STATUS  Resp: 16 (12/29/23 1621)  SpO2: (!) 91 % (12/29/23 1621)  Oxygen Concentration (%):  [28] 28        I & O (Last 24H):  Intake/Output Summary (Last 24 hours) at 12/29/2023 1847  Last data filed at 12/29/2023 1832  Gross per 24 hour   Intake 2117.17 ml   Output 1152 ml   Net 965.17 ml       WEIGHTS  Wt Readings from Last 3 Encounters:   12/26/23 1719 45.4 kg (100 lb)   12/26/23 1050 45.4 kg (100 lb)   10/18/23 1343 40.8 kg (90 lb)   09/19/23 1129 43.1 kg (95 lb)       Physical Exam patient is alert and oriented x2.  Her daughter Padmini is at the  "bedside.  Cranial nerves 2-12 appear to be intact on the right.  He has decreased visual field defect on the left.  No JVD regular rate and rhythm no rubs gallops or murmurs clear to auscultation bilaterally soft nontender nondistended abdomen.  No clubbing cyanosis or edema bilateral lower extremities.  Ecchymosis of the left lower extremities improving.  SCHEDULED MEDS:   albuterol-ipratropium  3 mL Nebulization Q6H    aspirin  81 mg Oral Daily    atorvastatin  40 mg Oral Daily    azithromycin  500 mg Intravenous Q24H    bisacodyL  10 mg Rectal Daily    cefTRIAXone (ROCEPHIN) IVPB  1 g Intravenous Q24H    clopidogreL  75 mg Oral Daily    enoxparin  40 mg Subcutaneous Daily    polyethylene glycol  17 g Oral Daily       CONTINUOUS INFUSIONS:    PRN MEDS:acetaminophen, melatonin, ondansetron    LABS AND DIAGNOSTICS     CBC LAST 3 DAYS  Recent Labs   Lab 12/26/23  1248 12/27/23  0756 12/28/23  0510   WBC 10.37 6.93 7.04   RBC 4.10* 3.79* 3.73*   HGB 13.1 12.1 11.8*   HCT 38.6 35.6* 35.6*   MCV 94.1* 93.9 95.4*   MCH 32.0* 31.9* 31.6*   MCHC 33.9 34.0 33.1   RDW 13.2 13.2 13.4    266 285   MPV 9.2 8.9 8.9       COAGULATION LAST 3 DAYS  No results for input(s): "LABPT", "INR", "APTT" in the last 168 hours.    CHEMISTRY LAST 3 DAYS  Recent Labs   Lab 12/26/23  1248 12/27/23  0756 12/27/23  1706 12/27/23  2345 12/28/23  0510 12/28/23  1822 12/29/23  0248 12/29/23  0806 12/29/23  1802   * 130* 129*   < > 132   < > 137 138 137   K 4.4 3.6 3.9  --  3.5  --   --   --   --    CO2 24 22* 23  --  19*  --   --   --   --    BUN 13.0 13.0 16.0  --  12.0  --   --   --   --    CREATININE 0.67 0.63 0.76  --  0.60  --   --   --   --    CALCIUM 8.7 8.2* 7.9*  --  8.1*  --   --   --   --    MG 1.90  --   --   --  2.00  --   --   --   --    ALBUMIN 3.4  --  2.6*  --  2.5*  --   --   --   --    ALKPHOS 85  --  64  --  63  --   --   --   --    ALT 39  --  26  --  24  --   --   --   --    AST 46*  --  28  --  27  --   --   --   " "--    BILITOT 0.8  --  0.4  --  0.5  --   --   --   --     < > = values in this interval not displayed.       CARDIAC PROFILE LAST 3 DAYS  No results for input(s): "BNP", "CPK", "CPKMB", "LDH", "TROPONINI" in the last 168 hours.    ENDOCRINE LAST 3 DAYS  Recent Labs   Lab 12/26/23 1248   TSH 0.709       LAST ARTERIAL BLOOD GAS  ABG  No results for input(s): "PH", "PO2", "PCO2", "HCO3", "BE" in the last 168 hours.    LAST 7 DAYS MICROBIOLOGY   Microbiology Results (last 7 days)       Procedure Component Value Units Date/Time    Blood culture #1 **CANNOT BE ORDERED STAT** [[id number]]  (Normal) Collected: 12/26/23 1248    Order Status: Completed Specimen: Blood from Arm, Right Updated: 12/29/23 1800     CULTURE, BLOOD ([facility]) No Growth At 72 Hours    Blood culture #2 **CANNOT BE ORDERED STAT** [[id number]]  (Normal) Collected: 12/26/23 1248    Order Status: Completed Specimen: Blood from Arm, Right Updated: 12/29/23 1800     CULTURE, BLOOD ([facility]) No Growth At 72 Hours            MOST RECENT IMAGING  CT Head Without Contrast  Narrative: EXAMINATION:  CT HEAD WITHOUT CONTRAST    CLINICAL HISTORY:  Stroke, follow up;, .    TECHNIQUE:  PATIENT RADIATION DOSE: DLP(mGycm) 844    As per PQRS measures, all CT scans at this facility used dose modulation, iterative reconstruction, and/or weight based dose adjustment when appropriate to reduce radiation dose to as low as reasonably achievable.    COMPARISON:  12/26/2023    FINDINGS:  Serial axial images were obtained of the head without the administration of IV contrast. Both brain and bone parenchymal windows were obtained.  Additional coronal and sagittal reconstructions were obtained.  Ventricles, cisterns, and sulci are prominent size.  There is again a low-attenuation area at the distribution of the right middle cerebral artery compatible with the old/evolving infarct/encephalomalacia.  There is no evidence of intracranial hemorrhage, midline shift, or abnormal extra-axial " fluid collections.  Scattered hypodensities are seen within the periventricular white matter.  Intracranial atherosclerosis is identified.  Cerebellar tonsils extend caudally to the level of foramen magnum.  The sella turcica is mostly empty.  Visualized paranasal sinuses and mastoid air cells are relatively clear.  External auditory canals are grossly patent.  Impression: 1. No acute intracranial abnormality identified  2. Old/evolving nonhemorrhagic infarct/encephalomalacia again identified in the distribution of the right middle cerebral artery which has a similar appearance to the recent exam  3. Generalized cerebral and cerebellar atrophy  4. Intracranial atherosclerosis  5. Scattered hypodensities at the peripheral white matter suspicious for small vessel ischemic changes    Electronically signed by: Phill Giles  Date:    12/28/2023  Time:    11:08  X-Ray Chest 1 View  Narrative: EXAMINATION:  XR CHEST 1 VIEW    CLINICAL HISTORY:  Hypoxia.;, .    COMPARISON:  12/26/2023    FINDINGS:  An AP view or more reveals the heart to be borderline enlarged.  The trachea is just left of midline.  There is interim increasing hazy opacification of the right mid and lower lung and left lower lung since the prior exam.  Atherosclerosis is seen within the aorta.  Bony structures are osteopenic.  Impression: 1. Interim increasing AZ opacification right mid and lower lung left lower lung suspicious for infiltrate, atelectasis, and pleural reaction  2. Borderline cardiomegaly  3. Atherosclerosis  4. Osteopenia    Electronically signed by: Phill Giles  Date:    12/28/2023  Time:    10:42  X-Ray Abdomen AP 1 View  Narrative: EXAMINATION:  XR ABDOMEN AP 1 VIEW    CLINICAL HISTORY:  R/o fecal bolus;, .    COMPARISON:  None available    FINDINGS:  Single AP or more views reveal unusual lucency at the uppermost abdomen.  There is suspect gas distension of the stomach.  Gas distended loops of large and small bowel are identified.   There is a fecal bolus at the rectum.  Degenerative changes and curvature are noted to the thoracolumbar spine.  Bony structures are osteopenic.Gas and feces are seen within the colon.  Impression: 1. Unusual lucency noted to the upper most abdomen which may be due to a protruding lower abdomen  2. Gas filled loops of large and small bowel fecal bolus at the rectum  3. Thoracolumbar spondylosis and scoliosis  4. Osteopenia    Electronically signed by: Phill Giles  Date:    12/28/2023  Time:    10:41      [facility]  Results for orders placed during the hospital encounter of 12/26/23    Echo    Interpretation Summary    Right Ventricle: Right ventricle was not well visualized due to poor acoustic window. Normal right ventricular cavity size. Systolic function is normal.    Left Atrium: Left atrium is mildly dilated.    Right Atrium: Right atrium is mildly dilated.    Aortic Valve: Moderately calcified cusps. There is moderate stenosis. Aortic valve peak velocity is 2.85 m/s. There is moderate to severe aortic regurgitation. AR PHTN 274 msec.    If clinically indicated a ALMAS can be helpful to further assess the severity of AV disease.    Left Ventricle: The left ventricle is normal in size. There is mild concentric hypertrophy. There is normal systolic function with a visually estimated ejection fraction of 55 - 60%.    Mitral Valve: Moderately thickened leaflets. Moderately calcified leaflets. There is mild stenosis. The mean pressure gradient across the mitral valve is 6 mmHg at a heart rate of  bpm. There is mild to moderate regurgitation.    Tricuspid Valve: There is no stenosis. There is mild regurgitation.    Pulmonic Valve: There is no stenosis.    Pleural effusion is present which can be better assessed by dedicated chest imaging.      CURRENT/PREVIOUS VISIT EKG  Results for orders placed or performed during the hospital encounter of 12/26/23   EKG 12-lead    Collection Time: 12/27/23 12:10 PM    Narrative     Test Reason : I69.952,    Vent. Rate : 076 BPM     Atrial Rate : 076 BPM     P-R Int : 164 ms          QRS Dur : 108 ms      QT Int : 406 ms       P-R-T Axes : 057 -74 084 degrees     QTc Int : 456 ms    Normal sinus rhythm  Left axis deviation  IVCD in LBBB morphology  Non-specific ST-T wave abnormalities  Abnormal ECG  No previous ECGs available  Confirmed by Ronald Weldon MD ([id number]) on 12/27/2023 3:30:29 PM    Referred By: [facility]   SELF           Confirmed By:Ronald Weldon MD       ASSESSMENT/PLAN:     Active Hospital Problems    Diagnosis    *Acute ischemic right middle cerebral artery (MCA) stroke    Constipation    Other localized visual field defect, left eye    Transient alteration of awareness    Hypoxemia    Encephalopathy, metabolic    Hyponatremia    Bruised sole of foot, left, initial encounter       ASSESSMENT & PLAN:   Continue ST PT OT.      Constipation resolved.      Continued aspirin Plavix for her stroke and deficits.  Continue PT.      Hypoxia secondary to a possible right-sided effusion versus pneumonia.  I have discussed this with Dr. Sierra she will follow with the weekend.  Questionable diuresis necessary.    With regards to her hyponatremia her urine sodium and serum sodium issues may have pointed towards some SIADH.  I will defer to Dr. Sierra to follow that over the weekend.  Consider salt tablets next.      RECOMMENDATIONS:  DVT prophylaxis with enoxaparin GI prophylaxis at this point not ordered but now that she is on aspirin I would consider putting that on.    Discussion with case management and the family.  I do not think that the patient was ready for discharge today even though she had a bed available at the [facility] in [place].  I am concerned that she may become hyponatremic again and we do not have control or real diagnosis of her hyponatremia.  Observation of the next 2 3 days is key to certify that diagnosis.  Plan on discharge hopefully Tuesday next week.    Will get  Dr. Sierra to follow up on the patient's cortisol if it comes in.  Consider adrenal insufficiency as another diagnosis    Tomer Samaniego III, MD  Department of Hospital Medicine ([facility])   Date of Service: 12/29/2023  6:47 PM

## 2023-12-31 LAB
ALBUMIN SERPL-MCNC: 2.7 G/DL (ref 3.4–4.8)
ALBUMIN/GLOB SERPL: 0.9 RATIO (ref 1.1–2)
ALP SERPL-CCNC: 72 UNIT/L (ref 40–150)
ALT SERPL-CCNC: 39 UNIT/L (ref 0–55)
AST SERPL-CCNC: 50 UNIT/L (ref 5–34)
BACTERIA BLD CULT: NORMAL
BACTERIA BLD CULT: NORMAL
BASOPHILS # BLD AUTO: 0.06 X10(3)/MCL
BASOPHILS NFR BLD AUTO: 0.9 %
BILIRUB SERPL-MCNC: 0.4 MG/DL
BUN SERPL-MCNC: 10 MG/DL (ref 9.8–20.1)
CALCIUM SERPL-MCNC: 8.8 MG/DL (ref 8.4–10.2)
CHLORIDE SERPL-SCNC: 101 MMOL/L (ref 98–111)
CO2 SERPL-SCNC: 24 MMOL/L (ref 23–31)
CREAT SERPL-MCNC: 0.55 MG/DL (ref 0.55–1.02)
EOSINOPHIL # BLD AUTO: 0.18 X10(3)/MCL (ref 0–0.9)
EOSINOPHIL NFR BLD AUTO: 2.7 %
ERYTHROCYTE [DISTWIDTH] IN BLOOD BY AUTOMATED COUNT: 13.2 % (ref 11.5–17)
GFR SERPLBLD CREATININE-BSD FMLA CKD-EPI: >60 MLS/MIN/1.73/M2
GLOBULIN SER-MCNC: 3.1 GM/DL (ref 2.4–3.5)
GLUCOSE SERPL-MCNC: 104 MG/DL (ref 75–121)
HCT VFR BLD AUTO: 39.2 % (ref 37–47)
HGB BLD-MCNC: 13.3 G/DL (ref 12–16)
IMM GRANULOCYTES # BLD AUTO: 0.02 X10(3)/MCL (ref 0–0.04)
IMM GRANULOCYTES NFR BLD AUTO: 0.3 %
LYMPHOCYTES # BLD AUTO: 1.01 X10(3)/MCL (ref 0.6–4.6)
LYMPHOCYTES NFR BLD AUTO: 15.3 %
MAGNESIUM SERPL-MCNC: 2 MG/DL (ref 1.6–2.6)
MCH RBC QN AUTO: 31.4 PG (ref 27–31)
MCHC RBC AUTO-ENTMCNC: 33.9 G/DL (ref 33–36)
MCV RBC AUTO: 92.7 FL (ref 80–94)
MONOCYTES # BLD AUTO: 0.63 X10(3)/MCL (ref 0.1–1.3)
MONOCYTES NFR BLD AUTO: 9.5 %
NEUTROPHILS # BLD AUTO: 4.7 X10(3)/MCL (ref 2.1–9.2)
NEUTROPHILS NFR BLD AUTO: 71.3 %
PHOSPHATE SERPL-MCNC: 3.7 MG/DL (ref 2.3–4.7)
PLATELET # BLD AUTO: 333 X10(3)/MCL (ref 130–400)
PMV BLD AUTO: 8.7 FL (ref 7.4–10.4)
POTASSIUM SERPL-SCNC: 3.4 MMOL/L (ref 3.5–5.1)
PROT SERPL-MCNC: 5.8 GM/DL (ref 5.8–7.6)
RBC # BLD AUTO: 4.23 X10(6)/MCL (ref 4.2–5.4)
SODIUM SERPL-SCNC: 135 MMOL/L (ref 132–146)
WBC # SPEC AUTO: 6.6 X10(3)/MCL (ref 4.5–11.5)

## 2023-12-31 PROCEDURE — 85025 COMPLETE CBC W/AUTO DIFF WBC: CPT | Performed by: FAMILY MEDICINE

## 2023-12-31 PROCEDURE — 25000003 PHARM REV CODE 250: Performed by: INTERNAL MEDICINE

## 2023-12-31 PROCEDURE — 84100 ASSAY OF PHOSPHORUS: CPT | Performed by: FAMILY MEDICINE

## 2023-12-31 PROCEDURE — 83735 ASSAY OF MAGNESIUM: CPT | Performed by: FAMILY MEDICINE

## 2023-12-31 PROCEDURE — 25000242 PHARM REV CODE 250 ALT 637 W/ HCPCS: Performed by: INTERNAL MEDICINE

## 2023-12-31 PROCEDURE — 99900035 HC TECH TIME PER 15 MIN (STAT)

## 2023-12-31 PROCEDURE — 63600175 PHARM REV CODE 636 W HCPCS: Performed by: INTERNAL MEDICINE

## 2023-12-31 PROCEDURE — 80053 COMPREHEN METABOLIC PANEL: CPT | Performed by: FAMILY MEDICINE

## 2023-12-31 PROCEDURE — 25000003 PHARM REV CODE 250: Performed by: FAMILY MEDICINE

## 2023-12-31 PROCEDURE — 21400001 HC TELEMETRY ROOM

## 2023-12-31 PROCEDURE — 51798 US URINE CAPACITY MEASURE: CPT

## 2023-12-31 PROCEDURE — 94640 AIRWAY INHALATION TREATMENT: CPT

## 2023-12-31 RX ORDER — POTASSIUM CHLORIDE 750 MG/1
10 TABLET, EXTENDED RELEASE ORAL ONCE
Status: COMPLETED | OUTPATIENT
Start: 2023-12-31 | End: 2023-12-31

## 2023-12-31 RX ADMIN — HYDROCODONE BITARTRATE AND ACETAMINOPHEN 1 TABLET: 7.5; 325 TABLET ORAL at 04:12

## 2023-12-31 RX ADMIN — CLOPIDOGREL BISULFATE 75 MG: 75 TABLET ORAL at 09:12

## 2023-12-31 RX ADMIN — IPRATROPIUM BROMIDE AND ALBUTEROL SULFATE 3 ML: 2.5; .5 SOLUTION RESPIRATORY (INHALATION) at 07:12

## 2023-12-31 RX ADMIN — ASPIRIN 81 MG CHEWABLE TABLET 81 MG: 81 TABLET CHEWABLE at 09:12

## 2023-12-31 RX ADMIN — HYDROCODONE BITARTRATE AND ACETAMINOPHEN 1 TABLET: 7.5; 325 TABLET ORAL at 12:12

## 2023-12-31 RX ADMIN — AZITHROMYCIN MONOHYDRATE 500 MG: 500 INJECTION, POWDER, LYOPHILIZED, FOR SOLUTION INTRAVENOUS at 03:12

## 2023-12-31 RX ADMIN — ATORVASTATIN CALCIUM 40 MG: 40 TABLET, FILM COATED ORAL at 09:12

## 2023-12-31 RX ADMIN — FAMOTIDINE 20 MG: 20 TABLET ORAL at 09:12

## 2023-12-31 RX ADMIN — POTASSIUM CHLORIDE 10 MEQ: 750 TABLET, FILM COATED, EXTENDED RELEASE ORAL at 09:12

## 2023-12-31 RX ADMIN — Medication 6 MG: at 12:12

## 2023-12-31 RX ADMIN — CEFTRIAXONE SODIUM 1 G: 1 INJECTION, POWDER, FOR SOLUTION INTRAMUSCULAR; INTRAVENOUS at 03:12

## 2023-12-31 RX ADMIN — ENOXAPARIN SODIUM 40 MG: 40 INJECTION SUBCUTANEOUS at 03:12

## 2023-12-31 NOTE — PT/OT/SLP PROGRESS
Physical Therapy      Patient Name:  Bhavani Galarza   MRN:  [id number]    Patient not seen today secondary to refused service  . Will follow-up 1-02-24.

## 2023-12-31 NOTE — PROGRESS NOTES
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  Pt in bed, sleeping, complaining of pain to b/l legs which is chronic according to daughter.  Pt awakes and answers questions with one word answers.  Dtr at the bedside and is concerned  because she is saying things like "just take me Jacky" and she has apparently never spoken like this before, she is writhing around in pain.  I reviewed her stable labs and vitals with the daughter.  The daughter is concerned that her pain is not being treated adequately.  We discussed that increasing pain meds may increase pts confusion and consciousness and daughter is aware, we settled on trying a low dose norco. Pt is DNR and I confirmed this with the daughter.  She has been off hypertonic saline, serum sodium normal today.  Required iv hydralazine for elevated blood pressure systolic of 170.     12/31- pt looks great today.  She is sitting up in bed reading the [employer].  She is eating her unsweetened yogurt and her appetite is returning.  She is currently not in any pain.  The nurses did call me last night bc family was concerned the norco was not lasting.  She got a dose of norco 7.5 around midnight and this morning is much better at 9 am denies any significant pain and so we will keep her norco 7.5 prn.  She is delighted that she has many family members comimng in and is even coordinating their whereabouts and travel arrangements.  She had 2 Bms this mornign.    Scheduled Meds:   albuterol-ipratropium  3 mL Nebulization Q6H    aspirin  81 mg Oral Daily    atorvastatin  40 mg Oral Daily    azithromycin  500 mg Intravenous Q24H    bisacodyL  10 mg Rectal Daily    cefTRIAXone (ROCEPHIN) IVPB  1 g Intravenous Q24H    clopidogreL  75 mg Oral Daily    enoxparin  40 mg Subcutaneous Daily    famotidine  20 mg Oral Daily    polyethylene glycol  17 g Oral Daily     Continuous Infusions:  PRN Meds:acetaminophen, " HYDROcodone-acetaminophen, melatonin, ondansetron    Review of patient's allergies indicates:  No Known Allergies    Review of Systems  ROS    OBJECTIVE:     Vital Signs (Most Recent)  Temp: 96.2 °F (35.7 °C) (12/31/23 0735)  Pulse: 78 (12/31/23 0735)  Resp: 18 (12/31/23 0730)  BP: (!) 163/74 (12/31/23 0735)  SpO2: (!) 94 % (12/31/23 0735)    Vital Signs Range (Last 24H):  Temp:  [96.2 °F (35.7 °C)-98.4 °F (36.9 °C)]   Pulse:  [74-83]   Resp:  [17-18]   BP: (152-169)/(70-86)   SpO2:  [93 %-96 %]     I & O (Last 24H):  Intake/Output Summary (Last 24 hours) at 12/31/2023 0955  Last data filed at 12/31/2023 0703  Gross per 24 hour   Intake --   Output 311 ml   Net -311 ml       Physical Exam:  Physical Exam   Constitutional: She is oriented to person, place, and time. She appears well-developed and well-nourished. No distress.   Elderly, frail, fully awake, alert, no slurred speech, fully oriented.  She is hard of hearing.   HENT:   Head: Normocephalic.   Right Ear: External ear normal.   Left Ear: External ear normal.   Eyes: Pupils are equal, round, and reactive to light.   Neck: No tracheal deviation present. No thyromegaly present.   Cardiovascular: Normal rate, regular rhythm and normal heart sounds. Exam reveals no friction rub.   No murmur heard.Pulmonary:      Effort: Pulmonary effort is normal.      Breath sounds: Normal breath sounds.     Abdominal: She exhibits no distension and no mass. There is no rebound and no guarding.   Musculoskeletal:         General: No tenderness or deformity.   Neurological: She is oriented to person, place, and time. No cranial nerve deficit. She exhibits normal muscle tone. Coordination normal.   Skin: Skin is warm and dry. Capillary refill takes less than 2 seconds. She is not diaphoretic. No erythema.   Psychiatric: Her behavior is normal. Judgment and thought content normal.        Laboratory:  Recent Results (from the past 24 hour(s))   Comprehensive Metabolic Panel     Collection Time: 12/31/23  2:48 AM   Result Value Ref Range    Sodium Level 135 132 - 146 mmol/L    Potassium Level 3.4 (L) 3.5 - 5.1 mmol/L    Chloride 101 98 - 111 mmol/L    Carbon Dioxide 24 23 - 31 mmol/L    Glucose Level 104 75 - 121 mg/dL    Blood Urea Nitrogen 10.0 9.8 - 20.1 mg/dL    Creatinine 0.55 0.55 - 1.02 mg/dL    Calcium Level Total 8.8 8.4 - 10.2 mg/dL    Protein Total 5.8 5.8 - 7.6 gm/dL    Albumin Level 2.7 (L) 3.4 - 4.8 g/dL    Globulin 3.1 2.4 - 3.5 gm/dL    Albumin/Globulin Ratio 0.9 (L) 1.1 - 2.0 ratio    Bilirubin Total 0.4 <=1.5 mg/dL    Alkaline Phosphatase 72 40 - 150 unit/L    Alanine Aminotransferase 39 0 - 55 unit/L    Aspartate Aminotransferase 50 (H) 5 - 34 unit/L    eGFR >60 mls/min/1.73/m2   Phosphorus    Collection Time: 12/31/23  2:48 AM   Result Value Ref Range    Phosphorus Level 3.7 2.3 - 4.7 mg/dL   Magnesium    Collection Time: 12/31/23  2:48 AM   Result Value Ref Range    Magnesium Level 2.00 1.60 - 2.60 mg/dL   CBC with Differential    Collection Time: 12/31/23  2:48 AM   Result Value Ref Range    WBC 6.60 4.50 - 11.50 x10(3)/mcL    RBC 4.23 4.20 - 5.40 x10(6)/mcL    Hgb 13.3 12.0 - 16.0 g/dL    Hct 39.2 37.0 - 47.0 %    MCV 92.7 80.0 - 94.0 fL    MCH 31.4 (H) 27.0 - 31.0 pg    MCHC 33.9 33.0 - 36.0 g/dL    RDW 13.2 11.5 - 17.0 %    Platelet 333 130 - 400 x10(3)/mcL    MPV 8.7 7.4 - 10.4 fL    Neut % 71.3 %    Lymph % 15.3 %    Mono % 9.5 %    Eos % 2.7 %    Basophil % 0.9 %    Lymph # 1.01 0.6 - 4.6 x10(3)/mcL    Neut # 4.70 2.1 - 9.2 x10(3)/mcL    Mono # 0.63 0.1 - 1.3 x10(3)/mcL    Eos # 0.18 0 - 0.9 x10(3)/mcL    Baso # 0.06 <=0.2 x10(3)/mcL    IG# 0.02 0 - 0.04 x10(3)/mcL    IG% 0.3 %        Diagnostic Results:  US Carotid Bilateral    Result Date: 12/27/2023  EXAMINATION: US CAROTID BILATERAL CLINICAL HISTORY: CVA;, Cerebral infarction, unspecified. COMPARISON: None available FINDINGS: Real-time imaging was performed through the right and left carotid arteries using  duplex Doppler imaging. NASCET utilized. No significant localized atherosclerotic plaque formation or focal stenosis noted to the visualized portion of the carotid arteries bilaterally.  Antegrade flow is evident within the vertebral arteries bilaterally. MEASUREMENTS: Right Systolic Velocities(cm/s): Internal Carotid: 67.2 cm/sec Common Carotid: 57.7 cm/sec Right IC:CC Ratio: 1.2 Left Systolic Velocities(cm/s): Internal Carotid: 45.5 cm/sec Common Carotid: 48.6 cm/sec Left IC:CC Ratio: 0.9     1. No significant localized atherosclerotic plaque formation or focal stenosis noted to the visualized portion of the carotid arteries bilaterally. Electronically signed by: Phill Giles Date:    12/27/2023 Time:    16:21    Echo    Result Date: 12/27/2023    Right Ventricle: Right ventricle was not well visualized due to poor acoustic window. Normal right ventricular cavity size. Systolic function is normal.   Left Atrium: Left atrium is mildly dilated.   Right Atrium: Right atrium is mildly dilated.   Aortic Valve: Moderately calcified cusps. There is moderate stenosis. Aortic valve peak velocity is 2.85 m/s. There is moderate to severe aortic regurgitation. AR PHTN 274 msec.   If clinically indicated a ALMAS can be helpful to further assess the severity of AV disease.   Left Ventricle: The left ventricle is normal in size. There is mild concentric hypertrophy. There is normal systolic function with a visually estimated ejection fraction of 55 - 60%.   Mitral Valve: Moderately thickened leaflets. Moderately calcified leaflets. There is mild stenosis. The mean pressure gradient across the mitral valve is 6 mmHg at a heart rate of  bpm. There is mild to moderate regurgitation.   Tricuspid Valve: There is no stenosis. There is mild regurgitation.   Pulmonic Valve: There is no stenosis.   Pleural effusion is present which can be better assessed by dedicated chest imaging.     X-Ray Tibia Fibula 2 View Left    Addendum Date:  12/27/2023    This exam is mislabeled and is actually a left tibia fibula Electronically signed by: Phill Giles Date:    12/27/2023 Time:    09:43    Result Date: 12/27/2023  EXAMINATION: XR TIBIA FIBULA 2 VIEW LEFT CLINICAL HISTORY: Injury, unspecified, initial encounter; . COMPARISON: None available. FINDINGS: AP and lateral views reveal no definite fracture or dislocation.  No aggressive osteolytic or osteoblastic lesions seen.Bony structures are osteopenic.  There is narrowing of the medial compartment space.     1. No acute osseous defect identified 2. Mild osteoarthritis 3. Osteopenia Electronically signed by: Phill Giles Date:    12/26/2023 Time:    14:35    X-Ray Ankle Complete Left    Addendum Date: 12/27/2023    This exam is mislabeled as right and is actually a left ankle. Electronically signed by: Phill Giles Date:    12/27/2023 Time:    09:42    Result Date: 12/27/2023  EXAMINATION: XR ANKLE COMPLETE 3 VIEW LEFT CLINICAL HISTORY: Injury, unspecified, initial encounter; . COMPARISON: None available. FINDINGS: AP, lateral, and obliques views reveal no definite fracture or dislocation.  The ankle mortise is intact.  No aggressive osteolytic or osteoblastic lesion is seen.  Bony structures are osteopenic.  Minimal enthesophyte formation is evident at the posterior calcaneus     1. No acute osseous defect identified 2. Minimal enthesophyte formation posterior calcaneus 3. Osteopenia 4. MR examination would allow further evaluation if clinically indicated Electronically signed by: Phill Giles Date:    12/26/2023 Time:    14:35    X-Ray Ankle 2 View Left    Result Date: 12/27/2023  EXAMINATION: XR ANKLE 2 VIEW LEFT CLINICAL HISTORY: ; Ecchymosis left lower extremity;. COMPARISON: 12/26/2023 at 12:20 FINDINGS: AP and lateral views reveal no definite fracture or dislocation.  Joint spaces appear grossly intact.  No aggressive osteolytic or osteoblastic lesions seen.Bony structures are osteopenic.   Minimal enthesophyte formation is evident at the posterior calcaneus     1. No acute osseous defect identified 2. Osteopenia 3. No acute osseous defect identified 4. MR examination would allow further evaluation if clinically indicated Electronically signed by: Phill Giles Date:    12/27/2023 Time:    09:39    X-Ray Tibia Fibula 2 View Left    Result Date: 12/27/2023  EXAMINATION: XR TIBIA FIBULA 2 VIEW LEFT CLINICAL HISTORY: ; Ecchymosis left lower extremity;. COMPARISON: None available. FINDINGS: AP and lateral views reveal no definite fracture or dislocation.  Joint spaces appear grossly intact.  No aggressive osteolytic or osteoblastic lesions seen.Bony structures are osteopenic.     1. No acute osseous defect identified 2. Osteopenia Electronically signed by: Phill Giles Date:    12/27/2023 Time:    09:26    X-Ray Foot 2 View Left    Result Date: 12/27/2023  EXAMINATION: XR FOOT 2 VIEW LEFT CLINICAL HISTORY: ; Ecchymosis left lower extremity;. COMPARISON: None available. FINDINGS: AP and lateral views deformity at the 1st metatarsal-phalangeal joint with bony destruction and remodeling.  There is absence of the 4th metatarsal head.  There is mild deformity at the base of the 4th proximal phalanx.  There is narrowing of the DIP and PIP joints.  There is fusion of the 5th DIP joint.  Bony structures are osteopenic.  No obvious acute fracture or dislocation is appreciated.  There is mild soft tissue prominence at the foot.     1. Bony deformity at the 1st metatarsophalangeal joint with bony remodeling and bony destruction 2. Absent 4th meta tarsal head and deformity at the base of the 4th proximal phalanx 3. Osteopenia 4. Osteoarthritis 5. Mild soft tissue prominence at the foot 6. MR examination would allow further evaluation if clinically indicated Electronically signed by: Phill Giles Date:    12/27/2023 Time:    09:24    X-Ray Chest 1 View    Result Date: 12/26/2023  EXAMINATION: XR CHEST 1 VIEW CLINICAL  HISTORY: , Shortness of breath. COMPARISON: None available FINDINGS: An AP view or more reveals the heart to be borderline enlarged.  There is patchy opacification at the lower lungs bilaterally.  Atherosclerosis is seen within the aorta.  Degenerative changes and curvature are noted to the thoracic spine.  Bony structures are considerably osteopenic.     1. Patchy infiltrate and or atelectasis lower lungs bilaterally 2. Borderline cardiomegaly 3. Atherosclerosis 4. Thoracic spondylosis, scoliosis, and osteopenia Electronically signed by: Phill Giles Date:    12/26/2023 Time:    16:33    CT Head Without Contrast    Result Date: 12/26/2023  EXAMINATION: CT HEAD WITHOUT CONTRAST CLINICAL HISTORY: Mental status change, unknown cause;, . TECHNIQUE: PATIENT RADIATION DOSE: DLP(mGycm) 871 As per PQRS measures, all CT scans at this facility used dose modulation, iterative reconstruction, and/or weight based dose adjustment when appropriate to reduce radiation dose to as low as reasonably achievable. COMPARISON: None available. FINDINGS: Serial axial images were obtained of the head without the administration of IV contrast. Both brain and bone parenchymal windows were obtained.  Additional coronal and sagittal reconstructions were obtained.  Ventricles, cisterns, and sulci are prominent in size.  There is no evidence of intracranial hemorrhage, midline shift, or abnormal extra-axial fluid collections.  There is low attenuation at the distribution of the right middle cerebral artery with sulcal effacement suspicious for evolving/old nonacute nonhemorrhagic infarct.  Minimal intracranial atherosclerosis is seen.  Mild scattered hypodensities are seen within the periventricular white matter.  Cerebellar tonsils extend caudally to the level of foramen magnum.  The sella turcica is mostly empty.  Visualized paranasal sinuses and mastoid air cells are relatively clear.  External auditory canals are grossly patent.  Bony  structures are osteopenic.     1. Nonacute nonhemorrhagic old/evolving infarct/encephalomalacia in the distribution of the right middle cerebral artery 2. Mild cerebral and cerebellar atrophy 3. Minimal intracranial atherosclerosis 4. Mild scattered hypodensity in periventricular white matter suspicious for small vessel ischemic changes Electronically signed by: Phill Giles Date:    12/26/2023 Time:    13:14    US Lower Extremity Arteries Left    Result Date: 12/26/2023  EXAMINATION: LEFT LOWER EXTREMITY ARTERIAL DOPPLER SONOGRAM: CLINICAL HISTORY: Other disorder of circulatory system, COMPARISON: 05/29/2023 FINDINGS: Ultrasound Doppler and color flow imaging were performed on the arteries of the left lower extremity.  A multi phasic flow wave pattern is evident throughout the visualized arteries of the left lower extremity.  Mild scattered plaque formation is evident at the arteries of left lower extremity with no significant focal stenosis or localized atherosclerotic plaque formation identified.     1.  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 12/28/2023    Constipation 12/28/2023    Other localized visual field defect, left eye 12/27/2023    Transient alteration of awareness 12/26/2023    Hypoxemia 12/26/2023    Encephalopathy, metabolic 12/26/2023    Hyponatremia 12/26/2023    Bruised sole of foot, left, initial encounter 12/26/2023    Lumbar stenosis with neurogenic claudication 09/22/2023      Better today  [place] decision bw myself and daughter to alleviate any suffering in this elderly lady we will start with norco 5 mg and see how this goes- did increase this  to 7.5 mg prn, she has not required any prn doses for the last 10 hrs.   Pt is a DNR  Continue DAPT  Seems the hyponatremia has resolved.  Bowel regimen

## 2024-01-01 PROCEDURE — 21400001 HC TELEMETRY ROOM

## 2024-01-01 PROCEDURE — 25000003 PHARM REV CODE 250: Performed by: INTERNAL MEDICINE

## 2024-01-01 PROCEDURE — 25000003 PHARM REV CODE 250: Performed by: FAMILY MEDICINE

## 2024-01-01 PROCEDURE — 63600175 PHARM REV CODE 636 W HCPCS: Performed by: INTERNAL MEDICINE

## 2024-01-01 PROCEDURE — 94761 N-INVAS EAR/PLS OXIMETRY MLT: CPT

## 2024-01-01 PROCEDURE — 25000242 PHARM REV CODE 250 ALT 637 W/ HCPCS: Performed by: INTERNAL MEDICINE

## 2024-01-01 PROCEDURE — 94640 AIRWAY INHALATION TREATMENT: CPT

## 2024-01-01 RX ORDER — HYDROCODONE BITARTRATE AND ACETAMINOPHEN 7.5; 325 MG/1; MG/1
1 TABLET ORAL EVERY 8 HOURS
Status: DISCONTINUED | OUTPATIENT
Start: 2024-01-01 | End: 2024-01-02

## 2024-01-01 RX ADMIN — HYDROCODONE BITARTRATE AND ACETAMINOPHEN 1 TABLET: 7.5; 325 TABLET ORAL at 02:01

## 2024-01-01 RX ADMIN — HYDROCODONE BITARTRATE AND ACETAMINOPHEN 1 TABLET: 7.5; 325 TABLET ORAL at 09:01

## 2024-01-01 RX ADMIN — ENOXAPARIN SODIUM 40 MG: 40 INJECTION SUBCUTANEOUS at 04:01

## 2024-01-01 RX ADMIN — IPRATROPIUM BROMIDE AND ALBUTEROL SULFATE 3 ML: 2.5; .5 SOLUTION RESPIRATORY (INHALATION) at 07:01

## 2024-01-01 RX ADMIN — ASPIRIN 81 MG CHEWABLE TABLET 81 MG: 81 TABLET CHEWABLE at 09:01

## 2024-01-01 RX ADMIN — HYDROCODONE BITARTRATE AND ACETAMINOPHEN 1 TABLET: 7.5; 325 TABLET ORAL at 05:01

## 2024-01-01 RX ADMIN — CEFTRIAXONE SODIUM 1 G: 1 INJECTION, POWDER, FOR SOLUTION INTRAMUSCULAR; INTRAVENOUS at 03:01

## 2024-01-01 RX ADMIN — FAMOTIDINE 20 MG: 20 TABLET ORAL at 09:01

## 2024-01-01 RX ADMIN — CLOPIDOGREL BISULFATE 75 MG: 75 TABLET ORAL at 09:01

## 2024-01-01 RX ADMIN — AZITHROMYCIN MONOHYDRATE 500 MG: 500 INJECTION, POWDER, LYOPHILIZED, FOR SOLUTION INTRAVENOUS at 02:01

## 2024-01-01 RX ADMIN — POLYETHYLENE GLYCOL 3350 17 G: 17 POWDER, FOR SOLUTION ORAL at 09:01

## 2024-01-01 RX ADMIN — ATORVASTATIN CALCIUM 40 MG: 40 TABLET, FILM COATED ORAL at 09:01

## 2024-01-01 NOTE — PROGRESS NOTES
"Progress Note    Admit Date: 12/26/2023   LOS: 6 days     SUBJECTIVE:     Follow-up For:  89 yo with mca cva.  Pt in bed, sleeping, complaining of pain to b/l legs which is chronic according to daughter.  Pt awakes and answers questions with one word answers.  Dtr at the bedside and is concerned  because she is saying things like "just take me Jacky" and she has apparently never spoken like this before, she is writhing around in pain.  I reviewed her stable labs and vitals with the daughter.  The daughter is concerned that her pain is not being treated adequately.  We discussed that increasing pain meds may increase pts confusion and consciousness and daughter is aware, we settled on trying a low dose norco. Pt is DNR and I confirmed this with the daughter.  She has been off hypertonic saline, serum sodium normal today.  Required iv hydralazine for elevated blood pressure systolic of 170.     12/31- pt looks great today.  She is sitting up in bed reading the [employer].  She is eating her unsweetened yogurt and her appetite is returning.  She is currently not in any pain.  The nurses did call me last night bc family was concerned the norco was not lasting.  She got a dose of norco 7.5 around midnight and this morning is much better at 9 am denies any significant pain and so we will keep her norco 7.5 prn.  She is delighted that she has many family members comimng in and is even coordinating their whereabouts and travel arrangements.  She had 2 Bms this mornign.    1/1- pt had increased pain overnight and daughter requesting to schedule to norco, we will schedule it q8 hrs and can still have it prn q4 for breakthrough.  Her granddaughter who is a PT is at the bedside. Pt seems motivated to get stronger.    Scheduled Meds:   albuterol-ipratropium  3 mL Nebulization Q6H    aspirin  81 mg Oral Daily    atorvastatin  40 mg Oral Daily    azithromycin  500 mg Intravenous Q24H    bisacodyL  10 mg Rectal Daily    " cefTRIAXone (ROCEPHIN) IVPB  1 g Intravenous Q24H    clopidogreL  75 mg Oral Daily    enoxparin  40 mg Subcutaneous Daily    famotidine  20 mg Oral Daily    HYDROcodone-acetaminophen  1 tablet Oral Q8H    polyethylene glycol  17 g Oral Daily     Continuous Infusions:  PRN Meds:acetaminophen, HYDROcodone-acetaminophen, melatonin, ondansetron    Review of patient's allergies indicates:  No Known Allergies    Review of Systems  ROS    OBJECTIVE:     Vital Signs (Most Recent)  Temp: 97.5 °F (36.4 °C) (01/01/24 1236)  Pulse: 80 (01/01/24 1236)  Resp: 18 (01/01/24 0923)  BP: (!) 146/72 (01/01/24 1236)  SpO2: (!) 93 % (01/01/24 1236)    Vital Signs Range (Last 24H):  Temp:  [97.4 °F (36.3 °C)-98.4 °F (36.9 °C)]   Pulse:  [80-96]   Resp:  [18-20]   BP: (146-172)/(72-90)   SpO2:  [92 %-94 %]     I & O (Last 24H):  Intake/Output Summary (Last 24 hours) at 1/1/2024 1431  Last data filed at 1/1/2024 1258  Gross per 24 hour   Intake 487 ml   Output --   Net 487 ml       Physical Exam:  Physical Exam   Constitutional: She is oriented to person, place, and time. She appears well-developed and well-nourished. No distress.   Elderly, frail, fully awake, alert, no slurred speech, fully oriented.  She is hard of hearing.   HENT:   Head: Normocephalic.   Right Ear: External ear normal.   Left Ear: External ear normal.   Eyes: Pupils are equal, round, and reactive to light.   Neck: No tracheal deviation present. No thyromegaly present.   Cardiovascular: Normal rate, regular rhythm and normal heart sounds. Exam reveals no friction rub.   No murmur heard.Pulmonary:      Effort: Pulmonary effort is normal.      Breath sounds: Normal breath sounds.     Abdominal: She exhibits no distension and no mass. There is no rebound and no guarding.   Musculoskeletal:         General: No tenderness or deformity.   Neurological: She is oriented to person, place, and time. No cranial nerve deficit. She exhibits normal muscle tone. Coordination normal.    Skin: Skin is warm and dry. Capillary refill takes less than 2 seconds. She is not diaphoretic. No erythema.   Psychiatric: Her behavior is normal. Judgment and thought content normal.        Laboratory:  No results found for this or any previous visit (from the past 24 hour(s)).       Diagnostic Results:  US Carotid Bilateral    Result Date: 12/27/2023  EXAMINATION: US CAROTID BILATERAL CLINICAL HISTORY: CVA;, Cerebral infarction, unspecified. COMPARISON: None available FINDINGS: Real-time imaging was performed through the right and left carotid arteries using duplex Doppler imaging. NASCET utilized. No significant localized atherosclerotic plaque formation or focal stenosis noted to the visualized portion of the carotid arteries bilaterally.  Antegrade flow is evident within the vertebral arteries bilaterally. MEASUREMENTS: Right Systolic Velocities(cm/s): Internal Carotid: 67.2 cm/sec Common Carotid: 57.7 cm/sec Right IC:CC Ratio: 1.2 Left Systolic Velocities(cm/s): Internal Carotid: 45.5 cm/sec Common Carotid: 48.6 cm/sec Left IC:CC Ratio: 0.9     1. No significant localized atherosclerotic plaque formation or focal stenosis noted to the visualized portion of the carotid arteries bilaterally. Electronically signed by: Phill Gilse Date:    12/27/2023 Time:    16:21    Echo    Result Date: 12/27/2023    Right Ventricle: Right ventricle was not well visualized due to poor acoustic window. Normal right ventricular cavity size. Systolic function is normal.   Left Atrium: Left atrium is mildly dilated.   Right Atrium: Right atrium is mildly dilated.   Aortic Valve: Moderately calcified cusps. There is moderate stenosis. Aortic valve peak velocity is 2.85 m/s. There is moderate to severe aortic regurgitation. AR PHTN 274 msec.   If clinically indicated a ALMAS can be helpful to further assess the severity of AV disease.   Left Ventricle: The left ventricle is normal in size. There is mild concentric hypertrophy.  There is normal systolic function with a visually estimated ejection fraction of 55 - 60%.   Mitral Valve: Moderately thickened leaflets. Moderately calcified leaflets. There is mild stenosis. The mean pressure gradient across the mitral valve is 6 mmHg at a heart rate of  bpm. There is mild to moderate regurgitation.   Tricuspid Valve: There is no stenosis. There is mild regurgitation.   Pulmonic Valve: There is no stenosis.   Pleural effusion is present which can be better assessed by dedicated chest imaging.     X-Ray Tibia Fibula 2 View Left    Addendum Date: 12/27/2023    This exam is mislabeled and is actually a left tibia fibula Electronically signed by: Phill Giles Date:    12/27/2023 Time:    09:43    Result Date: 12/27/2023  EXAMINATION: XR TIBIA FIBULA 2 VIEW LEFT CLINICAL HISTORY: Injury, unspecified, initial encounter; . COMPARISON: None available. FINDINGS: AP and lateral views reveal no definite fracture or dislocation.  No aggressive osteolytic or osteoblastic lesions seen.Bony structures are osteopenic.  There is narrowing of the medial compartment space.     1. No acute osseous defect identified 2. Mild osteoarthritis 3. Osteopenia Electronically signed by: Phill Giles Date:    12/26/2023 Time:    14:35    X-Ray Ankle Complete Left    Addendum Date: 12/27/2023    This exam is mislabeled as right and is actually a left ankle. Electronically signed by: Phill Giles Date:    12/27/2023 Time:    09:42    Result Date: 12/27/2023  EXAMINATION: XR ANKLE COMPLETE 3 VIEW LEFT CLINICAL HISTORY: Injury, unspecified, initial encounter; . COMPARISON: None available. FINDINGS: AP, lateral, and obliques views reveal no definite fracture or dislocation.  The ankle mortise is intact.  No aggressive osteolytic or osteoblastic lesion is seen.  Bony structures are osteopenic.  Minimal enthesophyte formation is evident at the posterior calcaneus     1. No acute osseous defect identified 2. Minimal enthesophyte  formation posterior calcaneus 3. Osteopenia 4. MR examination would allow further evaluation if clinically indicated Electronically signed by: Phill Giles Date:    12/26/2023 Time:    14:35    X-Ray Ankle 2 View Left    Result Date: 12/27/2023  EXAMINATION: XR ANKLE 2 VIEW LEFT CLINICAL HISTORY: ; Ecchymosis left lower extremity;. COMPARISON: 12/26/2023 at 12:20 FINDINGS: AP and lateral views reveal no definite fracture or dislocation.  Joint spaces appear grossly intact.  No aggressive osteolytic or osteoblastic lesions seen.Bony structures are osteopenic.  Minimal enthesophyte formation is evident at the posterior calcaneus     1. No acute osseous defect identified 2. Osteopenia 3. No acute osseous defect identified 4. MR examination would allow further evaluation if clinically indicated Electronically signed by: Phill Giles Date:    12/27/2023 Time:    09:39    X-Ray Tibia Fibula 2 View Left    Result Date: 12/27/2023  EXAMINATION: XR TIBIA FIBULA 2 VIEW LEFT CLINICAL HISTORY: ; Ecchymosis left lower extremity;. COMPARISON: None available. FINDINGS: AP and lateral views reveal no definite fracture or dislocation.  Joint spaces appear grossly intact.  No aggressive osteolytic or osteoblastic lesions seen.Bony structures are osteopenic.     1. No acute osseous defect identified 2. Osteopenia Electronically signed by: Phill Giles Date:    12/27/2023 Time:    09:26    X-Ray Foot 2 View Left    Result Date: 12/27/2023  EXAMINATION: XR FOOT 2 VIEW LEFT CLINICAL HISTORY: ; Ecchymosis left lower extremity;. COMPARISON: None available. FINDINGS: AP and lateral views deformity at the 1st metatarsal-phalangeal joint with bony destruction and remodeling.  There is absence of the 4th metatarsal head.  There is mild deformity at the base of the 4th proximal phalanx.  There is narrowing of the DIP and PIP joints.  There is fusion of the 5th DIP joint.  Bony structures are osteopenic.  No obvious acute fracture or  dislocation is appreciated.  There is mild soft tissue prominence at the foot.     1. Bony deformity at the 1st metatarsophalangeal joint with bony remodeling and bony destruction 2. Absent 4th meta tarsal head and deformity at the base of the 4th proximal phalanx 3. Osteopenia 4. Osteoarthritis 5. Mild soft tissue prominence at the foot 6. MR examination would allow further evaluation if clinically indicated Electronically signed by: Phill Giles Date:    12/27/2023 Time:    09:24    X-Ray Chest 1 View    Result Date: 12/26/2023  EXAMINATION: XR CHEST 1 VIEW CLINICAL HISTORY: , Shortness of breath. COMPARISON: None available FINDINGS: An AP view or more reveals the heart to be borderline enlarged.  There is patchy opacification at the lower lungs bilaterally.  Atherosclerosis is seen within the aorta.  Degenerative changes and curvature are noted to the thoracic spine.  Bony structures are considerably osteopenic.     1. Patchy infiltrate and or atelectasis lower lungs bilaterally 2. Borderline cardiomegaly 3. Atherosclerosis 4. Thoracic spondylosis, scoliosis, and osteopenia Electronically signed by: Phill Giles Date:    12/26/2023 Time:    16:33    CT Head Without Contrast    Result Date: 12/26/2023  EXAMINATION: CT HEAD WITHOUT CONTRAST CLINICAL HISTORY: Mental status change, unknown cause;, . TECHNIQUE: PATIENT RADIATION DOSE: DLP(mGycm) 871 As per PQRS measures, all CT scans at this facility used dose modulation, iterative reconstruction, and/or weight based dose adjustment when appropriate to reduce radiation dose to as low as reasonably achievable. COMPARISON: None available. FINDINGS: Serial axial images were obtained of the head without the administration of IV contrast. Both brain and bone parenchymal windows were obtained.  Additional coronal and sagittal reconstructions were obtained.  Ventricles, cisterns, and sulci are prominent in size.  There is no evidence of intracranial hemorrhage, midline  shift, or abnormal extra-axial fluid collections.  There is low attenuation at the distribution of the right middle cerebral artery with sulcal effacement suspicious for evolving/old nonacute nonhemorrhagic infarct.  Minimal intracranial atherosclerosis is seen.  Mild scattered hypodensities are seen within the periventricular white matter.  Cerebellar tonsils extend caudally to the level of foramen magnum.  The sella turcica is mostly empty.  Visualized paranasal sinuses and mastoid air cells are relatively clear.  External auditory canals are grossly patent.  Bony structures are osteopenic.     1. Nonacute nonhemorrhagic old/evolving infarct/encephalomalacia in the distribution of the right middle cerebral artery 2. Mild cerebral and cerebellar atrophy 3. Minimal intracranial atherosclerosis 4. Mild scattered hypodensity in periventricular white matter suspicious for small vessel ischemic changes Electronically signed by: Phill Giles Date:    12/26/2023 Time:    13:14    US Lower Extremity Arteries Left    Result Date: 12/26/2023  EXAMINATION: LEFT LOWER EXTREMITY ARTERIAL DOPPLER SONOGRAM: CLINICAL HISTORY: Other disorder of circulatory system, COMPARISON: 05/29/2023 FINDINGS: Ultrasound Doppler and color flow imaging were performed on the arteries of the left lower extremity.  A multi phasic flow wave pattern is evident throughout the visualized arteries of the left lower extremity.  Mild scattered plaque formation is evident at the arteries of left lower extremity with no significant focal stenosis or localized atherosclerotic plaque formation identified.     1.  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 12/28/2023    Constipation 12/28/2023    Other localized visual field defect, left eye 12/27/2023    Transient alteration of awareness 12/26/2023    Hypoxemia 12/26/2023    Encephalopathy, metabolic 12/26/2023    Hyponatremia 12/26/2023    Bruised sole of foot, left, initial encounter 12/26/2023    Lumbar stenosis with neurogenic claudication 09/22/2023      Better today  [place] decision bw myself and daughter to alleviate any suffering in this elderly lady we will start with norco 5 mg and see how this goes- did increase this to 7.5 mg prn, she has not required any prn doses for the last 10 hrs.   Pt is a DNR  Continue DAPT  Seems the hyponatremia has resolved.  Bowel regimen

## 2024-01-02 PROBLEM — M79.606: Status: ACTIVE | Noted: 2024-01-02

## 2024-01-02 LAB
ALBUMIN SERPL-MCNC: 2.7 G/DL (ref 3.4–4.8)
ALBUMIN/GLOB SERPL: 0.9 RATIO (ref 1.1–2)
ALP SERPL-CCNC: 76 UNIT/L (ref 40–150)
ALT SERPL-CCNC: 70 UNIT/L (ref 0–55)
AST SERPL-CCNC: 78 UNIT/L (ref 5–34)
BASOPHILS # BLD AUTO: 0.08 X10(3)/MCL
BASOPHILS NFR BLD AUTO: 1.1 %
BILIRUB SERPL-MCNC: 0.4 MG/DL
BUN SERPL-MCNC: 14 MG/DL (ref 9.8–20.1)
CALCIUM SERPL-MCNC: 8.3 MG/DL (ref 8.4–10.2)
CHLORIDE SERPL-SCNC: 102 MMOL/L (ref 98–111)
CO2 SERPL-SCNC: 25 MMOL/L (ref 23–31)
CREAT SERPL-MCNC: 0.6 MG/DL (ref 0.55–1.02)
EOSINOPHIL # BLD AUTO: 0.16 X10(3)/MCL (ref 0–0.9)
EOSINOPHIL NFR BLD AUTO: 2.3 %
ERYTHROCYTE [DISTWIDTH] IN BLOOD BY AUTOMATED COUNT: 13.2 % (ref 11.5–17)
GFR SERPLBLD CREATININE-BSD FMLA CKD-EPI: >60 MLS/MIN/1.73/M2
GLOBULIN SER-MCNC: 2.9 GM/DL (ref 2.4–3.5)
GLUCOSE SERPL-MCNC: 97 MG/DL (ref 75–121)
HCT VFR BLD AUTO: 38.1 % (ref 37–47)
HGB BLD-MCNC: 12.8 G/DL (ref 12–16)
IMM GRANULOCYTES # BLD AUTO: 0.02 X10(3)/MCL (ref 0–0.04)
IMM GRANULOCYTES NFR BLD AUTO: 0.3 %
LYMPHOCYTES # BLD AUTO: 0.9 X10(3)/MCL (ref 0.6–4.6)
LYMPHOCYTES NFR BLD AUTO: 12.8 %
MAGNESIUM SERPL-MCNC: 2 MG/DL (ref 1.6–2.6)
MCH RBC QN AUTO: 32.2 PG (ref 27–31)
MCHC RBC AUTO-ENTMCNC: 33.6 G/DL (ref 33–36)
MCV RBC AUTO: 95.7 FL (ref 80–94)
MONOCYTES # BLD AUTO: 0.66 X10(3)/MCL (ref 0.1–1.3)
MONOCYTES NFR BLD AUTO: 9.4 %
NEUTROPHILS # BLD AUTO: 5.2 X10(3)/MCL (ref 2.1–9.2)
NEUTROPHILS NFR BLD AUTO: 74.1 %
PHOSPHATE SERPL-MCNC: 3.7 MG/DL (ref 2.3–4.7)
PLATELET # BLD AUTO: 337 X10(3)/MCL (ref 130–400)
PMV BLD AUTO: 9 FL (ref 7.4–10.4)
POTASSIUM SERPL-SCNC: 3.3 MMOL/L (ref 3.5–5.1)
PROT SERPL-MCNC: 5.6 GM/DL (ref 5.8–7.6)
RBC # BLD AUTO: 3.98 X10(6)/MCL (ref 4.2–5.4)
SODIUM SERPL-SCNC: 136 MMOL/L (ref 132–146)
WBC # SPEC AUTO: 7.02 X10(3)/MCL (ref 4.5–11.5)

## 2024-01-02 PROCEDURE — 25000003 PHARM REV CODE 250: Performed by: FAMILY MEDICINE

## 2024-01-02 PROCEDURE — 63600175 PHARM REV CODE 636 W HCPCS: Performed by: INTERNAL MEDICINE

## 2024-01-02 PROCEDURE — 94761 N-INVAS EAR/PLS OXIMETRY MLT: CPT

## 2024-01-02 PROCEDURE — 83735 ASSAY OF MAGNESIUM: CPT | Performed by: FAMILY MEDICINE

## 2024-01-02 PROCEDURE — 84100 ASSAY OF PHOSPHORUS: CPT | Performed by: FAMILY MEDICINE

## 2024-01-02 PROCEDURE — 85025 COMPLETE CBC W/AUTO DIFF WBC: CPT | Performed by: FAMILY MEDICINE

## 2024-01-02 PROCEDURE — 21400001 HC TELEMETRY ROOM

## 2024-01-02 PROCEDURE — 99900035 HC TECH TIME PER 15 MIN (STAT)

## 2024-01-02 PROCEDURE — 80053 COMPREHEN METABOLIC PANEL: CPT | Performed by: FAMILY MEDICINE

## 2024-01-02 PROCEDURE — 25000003 PHARM REV CODE 250: Performed by: INTERNAL MEDICINE

## 2024-01-02 RX ORDER — HYDROCODONE BITARTRATE AND ACETAMINOPHEN 7.5; 325 MG/1; MG/1
1 TABLET ORAL EVERY 6 HOURS PRN
Status: DISCONTINUED | OUTPATIENT
Start: 2024-01-02 | End: 2024-01-03 | Stop reason: HOSPADM

## 2024-01-02 RX ORDER — IPRATROPIUM BROMIDE AND ALBUTEROL SULFATE 2.5; .5 MG/3ML; MG/3ML
3 SOLUTION RESPIRATORY (INHALATION) EVERY 6 HOURS PRN
Status: DISCONTINUED | OUTPATIENT
Start: 2024-01-02 | End: 2024-01-03 | Stop reason: HOSPADM

## 2024-01-02 RX ORDER — POTASSIUM CHLORIDE 20 MEQ/1
20 TABLET, EXTENDED RELEASE ORAL DAILY
Status: DISCONTINUED | OUTPATIENT
Start: 2024-01-02 | End: 2024-01-03 | Stop reason: HOSPADM

## 2024-01-02 RX ADMIN — HYDROCODONE BITARTRATE AND ACETAMINOPHEN 1 TABLET: 7.5; 325 TABLET ORAL at 09:01

## 2024-01-02 RX ADMIN — POLYETHYLENE GLYCOL 3350 17 G: 17 POWDER, FOR SOLUTION ORAL at 09:01

## 2024-01-02 RX ADMIN — POTASSIUM CHLORIDE 20 MEQ: 1500 TABLET, EXTENDED RELEASE ORAL at 12:01

## 2024-01-02 RX ADMIN — ENOXAPARIN SODIUM 40 MG: 40 INJECTION SUBCUTANEOUS at 05:01

## 2024-01-02 RX ADMIN — HYDROCODONE BITARTRATE AND ACETAMINOPHEN 1 TABLET: 7.5; 325 TABLET ORAL at 02:01

## 2024-01-02 RX ADMIN — BISACODYL 10 MG: 10 SUPPOSITORY RECTAL at 09:01

## 2024-01-02 RX ADMIN — CLOPIDOGREL BISULFATE 75 MG: 75 TABLET ORAL at 09:01

## 2024-01-02 RX ADMIN — FAMOTIDINE 20 MG: 20 TABLET ORAL at 09:01

## 2024-01-02 RX ADMIN — ASPIRIN 81 MG CHEWABLE TABLET 81 MG: 81 TABLET CHEWABLE at 09:01

## 2024-01-02 RX ADMIN — ATORVASTATIN CALCIUM 40 MG: 40 TABLET, FILM COATED ORAL at 09:01

## 2024-01-02 NOTE — PT/OT/SLP PROGRESS
Speech Language Pathology      Bhavani Galarza  MRN: [id number]    Patient not seen today secondary to  decreased level of alertness due to meds admitted as per fmaily. Will follow-up on 1/3/24.

## 2024-01-02 NOTE — PT/OT/SLP PROGRESS
Physical Therapy      Patient Name:  Bhavani Galarza   MRN:  [id number]    Patient not seen today secondary to unarousable due to medication . Will follow-up tomorrow.

## 2024-01-02 NOTE — PROGRESS NOTES
[facility]  [address] [place] [address]  Phone: [phone]    (Hospital) Internal Medicine  Progress Note      PATIENT NAME: Bhavani Galarza  MRN: [id number]  TODAY'S DATE: 01/02/2024  ADMIT DATE: 12/26/2023    SUBJECTIVE     PRINCIPLE PROBLEM: Acute ischemic right middle cerebral artery (MCA) stroke    INTERVAL HISTORY:    1/2/2024  Patient had some bumps in the road this weekend.  She was having some acute delirium on Saturday per Dr. Sierra his recollection the daughter and the staff.  Please see Dr. Sierra his note.  Patient was in significant pain which necessitated initiation of some Norco for this back pain down into her left leg.  Again she has a known MRI with some claudication.  It was improved with Norco.      However, since the patient has been on Norco.  She has been more somnolent, not able to participate with physical therapy.    Of note, her sodium levels have resolved and are holding steady.  A few decrement in her potassium levels which are being supplemented.         Review of patient's allergies indicates:  No Known Allergies    ROS-14 point review of systems except as mentioned above    OBJECTIVE     VITAL SIGNS (Most Recent)  Temp: 97.7 °F (36.5 °C) (01/02/24 1235)  Pulse: 87 (01/02/24 1235)  Resp: 20 (01/02/24 0908)  BP: (!) 164/87 (01/02/24 1235)  SpO2: (!) 94 % (01/02/24 1235)    VENTILATION STATUS  Resp: 20 (01/02/24 0908)  SpO2: (!) 94 % (01/02/24 1235)           I & O (Last 24H):  Intake/Output Summary (Last 24 hours) at 1/2/2024 1428  Last data filed at 1/2/2024 0906  Gross per 24 hour   Intake 335 ml   Output 950 ml   Net -615 ml       WEIGHTS  Wt Readings from Last 3 Encounters:   12/26/23 1719 45.4 kg (100 lb)   12/26/23 1050 45.4 kg (100 lb)   10/18/23 1343 40.8 kg (90 lb)   09/19/23 1129 43.1 kg (95 lb)       Physical Exam    Patient is alert and oriented x3.  She is somnolent however, her daughter Noreen and granddaughter are in the  "room.  Also director of nursing home placement Farnaz is as well    Patient has discernible less left-sided neglect on exam.  Cranial nerves otherwise appear to be intact.  She is swallowing well without any difficulties.  Her extremity weakness of the left side upper extremity has resolved.  She is regular rate and rhythm no rubs gallops or murmurs clear to auscultation bilaterally soft nontender nondistended abdomen no clubbing cyanosis or edema bilateral lower extremities.  She has no lower extremity deficits 1, versus of the other,.      SCHEDULED MEDS:   albuterol-ipratropium  3 mL Nebulization Q6H    aspirin  81 mg Oral Daily    atorvastatin  40 mg Oral Daily    azithromycin  500 mg Intravenous Q24H    bisacodyL  10 mg Rectal Daily    cefTRIAXone (ROCEPHIN) IVPB  1 g Intravenous Q24H    clopidogreL  75 mg Oral Daily    enoxparin  40 mg Subcutaneous Daily    famotidine  20 mg Oral Daily    HYDROcodone-acetaminophen  1 tablet Oral Q8H    polyethylene glycol  17 g Oral Daily    potassium chloride  20 mEq Oral Daily       CONTINUOUS INFUSIONS:    PRN MEDS:acetaminophen, HYDROcodone-acetaminophen, melatonin, ondansetron    LABS AND DIAGNOSTICS     CBC LAST 3 DAYS  Recent Labs   Lab 12/30/23 0257 12/31/23 0248 01/02/24  0433   WBC 6.97 6.60 7.02   RBC 4.05* 4.23 3.98*   HGB 12.8 13.3 12.8   HCT 38.9 39.2 38.1   MCV 96.0* 92.7 95.7*   MCH 31.6* 31.4* 32.2*   MCHC 32.9* 33.9 33.6   RDW 13.4 13.2 13.2    333 337   MPV 9.1 8.7 9.0       COAGULATION LAST 3 DAYS  No results for input(s): "LABPT", "INR", "APTT" in the last 168 hours.    CHEMISTRY LAST 3 DAYS  Recent Labs   Lab 12/30/23 0257 12/31/23 0248 01/02/24  0433    135 136   K 3.2* 3.4* 3.3*   CO2 20* 24 25   BUN 11.0 10.0 14.0   CREATININE 0.55 0.55 0.60   CALCIUM 8.4 8.8 8.3*   MG 2.20 2.00 2.00   ALBUMIN 2.5* 2.7* 2.7*   ALKPHOS 67 72 76   ALT 25 39 70*   AST 32 50* 78*   BILITOT 0.4 0.4 0.4       CARDIAC PROFILE LAST 3 DAYS  No results for " "input(s): "BNP", "CPK", "CPKMB", "LDH", "TROPONINI" in the last 168 hours.    ENDOCRINE LAST 3 DAYS  No results for input(s): "TSH", "PROCAL" in the last 168 hours.    LAST ARTERIAL BLOOD GAS  ABG  No results for input(s): "PH", "PO2", "PCO2", "HCO3", "BE" in the last 168 hours.    LAST 7 DAYS MICROBIOLOGY   Microbiology Results (last 7 days)       Procedure Component Value Units Date/Time    Blood culture #1 **CANNOT BE ORDERED STAT** [[id number]]  (Normal) Collected: 12/26/23 1248    Order Status: Completed Specimen: Blood from Arm, Right Updated: 12/31/23 1801     CULTURE, BLOOD ([facility]) No Growth at 5 days    Blood culture #2 **CANNOT BE ORDERED STAT** [[id number]]  (Normal) Collected: 12/26/23 1248    Order Status: Completed Specimen: Blood from Arm, Right Updated: 12/31/23 1801     CULTURE, BLOOD ([facility]) No Growth at 5 days            MOST RECENT IMAGING  X-Ray Chest 1 View  Narrative: EXAMINATION:  XR CHEST 1 VIEW    CLINICAL HISTORY:  f/u on pleural effusion;, .    COMPARISON:  12/28/2023    FINDINGS:  An AP view or more reveals the heart to be mildly enlarged.  The trachea is just left of midline.  Atherosclerosis is seen within the aorta.  Patchy hazy opacities evident at the mid and lower lungs bilaterally.  Bony structures are osteopenic.  Impression: 1. Patchy hazy opacities mid and lower lungs bilaterally suspicious for infiltrate, atelectasis, and pleural reaction  2. Borderline cardiomegaly  3. Atherosclerosis    Electronically signed by: Phill Giles  Date:    01/02/2024  Time:    13:50      [facility]  Results for orders placed during the hospital encounter of 12/26/23    Echo    Interpretation Summary    Right Ventricle: Right ventricle was not well visualized due to poor acoustic window. Normal right ventricular cavity size. Systolic function is normal.    Left Atrium: Left atrium is mildly dilated.    Right Atrium: Right atrium is mildly dilated.    Aortic Valve: Moderately calcified cusps. There is " moderate stenosis. Aortic valve peak velocity is 2.85 m/s. There is moderate to severe aortic regurgitation. AR PHTN 274 msec.    If clinically indicated a ALMAS can be helpful to further assess the severity of AV disease.    Left Ventricle: The left ventricle is normal in size. There is mild concentric hypertrophy. There is normal systolic function with a visually estimated ejection fraction of 55 - 60%.    Mitral Valve: Moderately thickened leaflets. Moderately calcified leaflets. There is mild stenosis. The mean pressure gradient across the mitral valve is 6 mmHg at a heart rate of  bpm. There is mild to moderate regurgitation.    Tricuspid Valve: There is no stenosis. There is mild regurgitation.    Pulmonic Valve: There is no stenosis.    Pleural effusion is present which can be better assessed by dedicated chest imaging.      CURRENT/PREVIOUS VISIT EKG  Results for orders placed or performed during the hospital encounter of 12/26/23   EKG 12-lead    Collection Time: 12/27/23 12:10 PM    Narrative    Test Reason : I69.952,    Vent. Rate : 076 BPM     Atrial Rate : 076 BPM     P-R Int : 164 ms          QRS Dur : 108 ms      QT Int : 406 ms       P-R-T Axes : 057 -74 084 degrees     QTc Int : 456 ms    Normal sinus rhythm  Left axis deviation  IVCD in LBBB morphology  Non-specific ST-T wave abnormalities  Abnormal ECG  No previous ECGs available  Confirmed by Ronald Weldon MD ([id number]) on 12/27/2023 3:30:29 PM    Referred By: [facility]   SELF           Confirmed By:Ronald Weldon MD       ASSESSMENT/PLAN:     Active Hospital Problems    Diagnosis    *Acute ischemic right middle cerebral artery (MCA) stroke    Lower extremity pain, anterior, unspecified laterality    Constipation    Other localized visual field defect, left eye    Transient alteration of awareness    Hypoxemia    Encephalopathy, metabolic    Hyponatremia    Bruised sole of foot, left, initial encounter       ASSESSMENT & PLAN:   Patient seems to be  recovering well on establishing stability of side-effects/residuals of right MCA stroke.  Continue aspirin Plavix.      Continue cathartics as needed for constipation.    Watching for her left visual defect were seems to be mildly improving subjectively    I am going to hold off on her opiates and changed to p.r.n. because I do not want him to be an issue with regards to complications.  Lengthy discussion with the family for that.  They agree.  Will initiate some other therapies for her lower extremity pain.  I am going to initiate some Neurontin b.i.d..  Will also add on likely an SSRI for numerous reasons including increased appetite, onset depression because of current medical illnesses, and possible pain control.  I will restart the Neurontin 1st.  Cautious in the setting of previous low-sodium      RECOMMENDATIONS:  DVT prophylaxis with enoxaparin GI prophylaxis with famotidine.      Of note, will initiate potassium for potassium low blood level.  And additionally will stop antibiotics today has received 1 week of treatment.  Chest x-ray with some persistence that I think it is just atelectasis and passive effusion due to increased supine state of the patient.        Tomer Samaniego III, MD  Department of Hospital Medicine ([facility])   Date of Service: 01/02/2024  2:21 PM

## 2024-01-02 NOTE — PLAN OF CARE
Daughter is not sure if she wants her mom to go to [facility] and has reached out to Farn[facility] at [facility].  She really wants her mom at [facility], however, they are full.  She is contemplating sending her to [facility], however, she will have to share a room until [facility] has an opening for a private room, and will go and tour SW today. Will follow up in AM.

## 2024-01-03 VITALS
SYSTOLIC BLOOD PRESSURE: 153 MMHG | RESPIRATION RATE: 19 BRPM | WEIGHT: 100 LBS | DIASTOLIC BLOOD PRESSURE: 84 MMHG | HEIGHT: 61 IN | HEART RATE: 76 BPM | OXYGEN SATURATION: 95 % | BODY MASS INDEX: 18.88 KG/M2 | TEMPERATURE: 97 F

## 2024-01-03 PROBLEM — K59.00 CONSTIPATION: Status: RESOLVED | Noted: 2023-12-28 | Resolved: 2024-01-03

## 2024-01-03 PROBLEM — G93.41 ENCEPHALOPATHY, METABOLIC: Status: RESOLVED | Noted: 2023-12-26 | Resolved: 2024-01-03

## 2024-01-03 PROBLEM — R09.02 HYPOXEMIA: Status: RESOLVED | Noted: 2023-12-26 | Resolved: 2024-01-03

## 2024-01-03 PROBLEM — E87.1 HYPONATREMIA: Status: RESOLVED | Noted: 2023-12-26 | Resolved: 2024-01-03

## 2024-01-03 PROBLEM — R40.4 TRANSIENT ALTERATION OF AWARENESS: Status: RESOLVED | Noted: 2023-12-26 | Resolved: 2024-01-03

## 2024-01-03 PROBLEM — R63.0 DECREASED APPETITE: Status: ACTIVE | Noted: 2024-01-03

## 2024-01-03 LAB
ALBUMIN SERPL-MCNC: 2.8 G/DL (ref 3.4–4.8)
ALBUMIN/GLOB SERPL: 1 RATIO (ref 1.1–2)
ALP SERPL-CCNC: 77 UNIT/L (ref 40–150)
ALT SERPL-CCNC: 70 UNIT/L (ref 0–55)
AST SERPL-CCNC: 66 UNIT/L (ref 5–34)
BASOPHILS # BLD AUTO: 0.06 X10(3)/MCL
BASOPHILS NFR BLD AUTO: 1 %
BILIRUB SERPL-MCNC: 0.5 MG/DL
BUN SERPL-MCNC: 12 MG/DL (ref 9.8–20.1)
CALCIUM SERPL-MCNC: 8.6 MG/DL (ref 8.4–10.2)
CHLORIDE SERPL-SCNC: 103 MMOL/L (ref 98–111)
CO2 SERPL-SCNC: 25 MMOL/L (ref 23–31)
CREAT SERPL-MCNC: 0.48 MG/DL (ref 0.55–1.02)
EOSINOPHIL # BLD AUTO: 0.26 X10(3)/MCL (ref 0–0.9)
EOSINOPHIL NFR BLD AUTO: 4.4 %
ERYTHROCYTE [DISTWIDTH] IN BLOOD BY AUTOMATED COUNT: 13.2 % (ref 11.5–17)
GFR SERPLBLD CREATININE-BSD FMLA CKD-EPI: >60 MLS/MIN/1.73/M2
GLOBULIN SER-MCNC: 2.9 GM/DL (ref 2.4–3.5)
GLUCOSE SERPL-MCNC: 87 MG/DL (ref 75–121)
HCT VFR BLD AUTO: 39.4 % (ref 37–47)
HGB BLD-MCNC: 13.3 G/DL (ref 12–16)
IMM GRANULOCYTES # BLD AUTO: 0.02 X10(3)/MCL (ref 0–0.04)
IMM GRANULOCYTES NFR BLD AUTO: 0.3 %
LYMPHOCYTES # BLD AUTO: 1.18 X10(3)/MCL (ref 0.6–4.6)
LYMPHOCYTES NFR BLD AUTO: 19.9 %
MAGNESIUM SERPL-MCNC: 1.9 MG/DL (ref 1.6–2.6)
MCH RBC QN AUTO: 31.8 PG (ref 27–31)
MCHC RBC AUTO-ENTMCNC: 33.8 G/DL (ref 33–36)
MCV RBC AUTO: 94.3 FL (ref 80–94)
MONOCYTES # BLD AUTO: 0.57 X10(3)/MCL (ref 0.1–1.3)
MONOCYTES NFR BLD AUTO: 9.6 %
NEUTROPHILS # BLD AUTO: 3.85 X10(3)/MCL (ref 2.1–9.2)
NEUTROPHILS NFR BLD AUTO: 64.8 %
PHOSPHATE SERPL-MCNC: 3.1 MG/DL (ref 2.3–4.7)
PLATELET # BLD AUTO: 349 X10(3)/MCL (ref 130–400)
PMV BLD AUTO: 9.1 FL (ref 7.4–10.4)
POTASSIUM SERPL-SCNC: 3.4 MMOL/L (ref 3.5–5.1)
PROT SERPL-MCNC: 5.7 GM/DL (ref 5.8–7.6)
RBC # BLD AUTO: 4.18 X10(6)/MCL (ref 4.2–5.4)
SODIUM SERPL-SCNC: 136 MMOL/L (ref 132–146)
WBC # SPEC AUTO: 5.94 X10(3)/MCL (ref 4.5–11.5)

## 2024-01-03 PROCEDURE — 80053 COMPREHEN METABOLIC PANEL: CPT | Performed by: INTERNAL MEDICINE

## 2024-01-03 PROCEDURE — 85025 COMPLETE CBC W/AUTO DIFF WBC: CPT | Performed by: INTERNAL MEDICINE

## 2024-01-03 PROCEDURE — 99900035 HC TECH TIME PER 15 MIN (STAT)

## 2024-01-03 PROCEDURE — 25000003 PHARM REV CODE 250: Performed by: INTERNAL MEDICINE

## 2024-01-03 PROCEDURE — 83735 ASSAY OF MAGNESIUM: CPT | Performed by: INTERNAL MEDICINE

## 2024-01-03 PROCEDURE — 25000003 PHARM REV CODE 250: Performed by: FAMILY MEDICINE

## 2024-01-03 PROCEDURE — 94761 N-INVAS EAR/PLS OXIMETRY MLT: CPT

## 2024-01-03 PROCEDURE — 84100 ASSAY OF PHOSPHORUS: CPT | Performed by: INTERNAL MEDICINE

## 2024-01-03 RX ORDER — GABAPENTIN 300 MG/1
300 CAPSULE ORAL 2 TIMES DAILY
Status: DISCONTINUED | OUTPATIENT
Start: 2024-01-03 | End: 2024-01-03 | Stop reason: HOSPADM

## 2024-01-03 RX ORDER — POTASSIUM CHLORIDE 20 MEQ/1
20 TABLET, EXTENDED RELEASE ORAL DAILY
Qty: 3 TABLET | Refills: 0
Start: 2024-01-03 | End: 2024-01-06

## 2024-01-03 RX ORDER — ACETAMINOPHEN 325 MG/1
650 TABLET ORAL EVERY 6 HOURS PRN
Refills: 0 | Status: ON HOLD
Start: 2024-01-03 | End: 2024-01-30

## 2024-01-03 RX ORDER — FAMOTIDINE 20 MG/1
20 TABLET, FILM COATED ORAL DAILY
Qty: 30 TABLET | Refills: 11 | Status: SHIPPED | OUTPATIENT
Start: 2024-01-03 | End: 2025-01-02

## 2024-01-03 RX ORDER — SERTRALINE HYDROCHLORIDE 25 MG/1
25 TABLET, FILM COATED ORAL DAILY
Status: DISCONTINUED | OUTPATIENT
Start: 2024-01-03 | End: 2024-01-03 | Stop reason: HOSPADM

## 2024-01-03 RX ORDER — SERTRALINE HYDROCHLORIDE 25 MG/1
25 TABLET, FILM COATED ORAL DAILY
Qty: 30 TABLET | Refills: 11 | Status: ON HOLD | OUTPATIENT
Start: 2024-01-03 | End: 2024-01-30 | Stop reason: HOSPADM

## 2024-01-03 RX ORDER — ATORVASTATIN CALCIUM 40 MG/1
40 TABLET, FILM COATED ORAL DAILY
Qty: 90 TABLET | Refills: 3 | Status: SHIPPED | OUTPATIENT
Start: 2024-01-03 | End: 2025-01-02

## 2024-01-03 RX ORDER — CLOPIDOGREL BISULFATE 75 MG/1
75 TABLET ORAL DAILY
Qty: 30 TABLET | Refills: 11 | Status: ON HOLD | OUTPATIENT
Start: 2024-01-03 | End: 2024-01-18

## 2024-01-03 RX ORDER — IPRATROPIUM BROMIDE AND ALBUTEROL SULFATE 2.5; .5 MG/3ML; MG/3ML
3 SOLUTION RESPIRATORY (INHALATION) EVERY 6 HOURS PRN
Qty: 75 ML | Refills: 0 | Status: SHIPPED | OUTPATIENT
Start: 2024-01-03 | End: 2025-01-02

## 2024-01-03 RX ORDER — POLYETHYLENE GLYCOL 3350 17 G/17G
17 POWDER, FOR SOLUTION ORAL DAILY
Refills: 0 | Status: ON HOLD
Start: 2024-01-03 | End: 2024-01-30 | Stop reason: HOSPADM

## 2024-01-03 RX ORDER — HYDROCODONE BITARTRATE AND ACETAMINOPHEN 7.5; 325 MG/1; MG/1
1 TABLET ORAL EVERY 6 HOURS PRN
Qty: 30 TABLET | Refills: 0 | Status: SHIPPED | OUTPATIENT
Start: 2024-01-03

## 2024-01-03 RX ADMIN — HYDROCODONE BITARTRATE AND ACETAMINOPHEN 1 TABLET: 7.5; 325 TABLET ORAL at 06:01

## 2024-01-03 RX ADMIN — POTASSIUM CHLORIDE 20 MEQ: 1500 TABLET, EXTENDED RELEASE ORAL at 09:01

## 2024-01-03 RX ADMIN — FAMOTIDINE 20 MG: 20 TABLET ORAL at 09:01

## 2024-01-03 RX ADMIN — POLYETHYLENE GLYCOL 3350 17 G: 17 POWDER, FOR SOLUTION ORAL at 09:01

## 2024-01-03 RX ADMIN — ACETAMINOPHEN 650 MG: 325 TABLET, FILM COATED ORAL at 12:01

## 2024-01-03 RX ADMIN — ASPIRIN 81 MG CHEWABLE TABLET 81 MG: 81 TABLET CHEWABLE at 09:01

## 2024-01-03 RX ADMIN — CLOPIDOGREL BISULFATE 75 MG: 75 TABLET ORAL at 09:01

## 2024-01-03 RX ADMIN — GABAPENTIN 300 MG: 300 CAPSULE ORAL at 09:01

## 2024-01-03 RX ADMIN — SERTRALINE HYDROCHLORIDE 25 MG: 25 TABLET ORAL at 09:01

## 2024-01-03 RX ADMIN — ATORVASTATIN CALCIUM 40 MG: 40 TABLET, FILM COATED ORAL at 09:01

## 2024-01-03 NOTE — PLAN OF CARE
01/03/24 0913   Final Note   Assessment Type Final Discharge Note   Anticipated Discharge Disposition SNF   Post-Acute Status   Post-Acute Authorization Placement   Post-Acute Placement Status Set-up Complete/Auth obtained   Discharge Delays None known at this time     D/c info sent to [facility] for her to d/c to SNF today.  Placed pt in will-call w/ [facility]. Pt has poor trunk control and trip will be billed to her insurance.  Nurse will call report to Meghan.  Hard narcotic script copied and placed copy in red chart and also sent to [facility] in [place].

## 2024-01-04 NOTE — DISCHARGE SUMMARY
[facility] - Medical Surgical Unit  Hospital Medicine  Discharge Summary      Patient Name: Bhavani Galarza  MRN: [id number]  COURTNEY: [id number]  Patient Class: IP- Inpatient  Admission Date: 12/26/2023  Hospital Length of Stay: 8 days  Discharge Date and Time:  01/03/2024 10:10 PM  Attending Physician: No att. providers found   Discharging Provider: Tomer Samaniego III, MD  Primary Care Provider: Tomer Samaniego III, MD    Primary Care Team: Networked reference to record PCT     HPI:   This is a 89-year-old very pleasant intelligent articulate white female who presented to the emergency room in [facility].  Upon arriving the patient was hypoxemic and showed signs of confusion.  The patient had some O2 sats that were in low enough arrange for 2 L to maintain.  Patient had a head CT which was negative for any acute infarctions.      Further peripheral discussion later had this evening from friend of the family Mr. Pickett.  Indicated that the patient had a [place] dinner yesterday and was doing quite well until he jokes and laughing and then shortly after the dinner the patient became much more confused and was not doing well.  He said that she stated she did not want to go to [facility].  He came back over to check on her but she would already hit the ambulance monitor that she wears around her neck.    The only invasive issue she has had in the last 2-3 months surgically speaking was an epidural steroid injection by Dr. Amrit Song in October.    She received IV fluids and some empiric antibiotics in the ER per my discussion with the emergency room doctor.  We are admitting her for workup of her hepatic encephalopathy, treatment for her hyponatremia and further workup of her reason for current condition she presented in.        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  Please see my admit H&P for full historical details.  Essentially she presented with a left-sided neglect as well as a left upper extremity weakness.  She also presented hypoxemic for unclear etiology as well.  I consider she might have had an aspiration pneumonia versus aspiration pneumonitis was kept on some azithromycin and Rocephin for weak but recovered from this well without any complications.      Patient was treated for her stroke acutely and monitored conservatively.  Family was involved in these discussions.  She had a CT that was done a proximally 48 hours later of the head without IV contrast that showed relative stability but without hemorrhagic transformation stroke.  Aspirin Plavix atorvastatin 40 mg were given daily and she showed signs of clinical stability over the next 48-72 hours.  She started to have less upper extremity weakness on the left side; and today and yesterday I saw that she was having left sided neglect improvement even the may still be present to some degree.    She had a negative carotid ultrasound without any blockages.  She did have an echocardiogram which showed some moderate to severe aortic stenosis and if she does well as an outpatient will consider having Cardiology look at this a little further.  Please see their notes for documentation.    The patient also presented with some hyponatremia.  Her sodium was a proximally 129 when she was admitted she failed volume resuscitation had to put her on hot salt for about 72 hours.  She responded this well and maintain her sodium to a level 135.  She was not on any known medications besides perhaps rare cases of gabapentin causing this.  She did not show signs of heart failure and again, she seemed to be transiently hypovolemic hyponatremia.  She did sustain a fall that the family thinks to the head that was confirmed with some leg trauma on the contralateral  side but that was negative for orthopedic injury.  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  Did she resolve this?  Regardless I think we reset her Osmo stat and she is doing well.  I was careful to restart her Neurontin and her adding of sertraline for appetite will have to follow up on this as an outpatient.  Of note, tramadol can cause some of this is well.  She was taken that most recently but not every day    Her hospital course was further complicated by her lower extremity pain.  She has has a known L5 root radicular lesion that failed 1 epidural steroid injection done here at the hospital.  As an outpatient prior to her stroke we were planning on getting her another KEERTHI but she failed to make it that far.  Dr. Sierra, my partner over the weekend placed her on some Norco and that seemed to help because the patient was in such painful stress/duress.  Will discharge on p.r.n. q.6 Norco as discussed at length with the family in terms of the pros and cons and risks and benefits of this medication.  They understand and agree.    Otherwise we all discerned that getting her to the skilled nursing facility was in her best interest as opposed to a lingering here more in the hospital.    Will therefore discharge her to the skilled nursing facility with re-initiated Neurontin b.i.d., added sertraline for appetite since it is decreased during the hospitalization.    Will get a repeat CMP in the next week when she is restarted on all these medications.  And will follow up in my office in 1-2 weeks.      Lengthy discussion with the daughter Noreen.  She is in agreement with the plan.  Time spent today 30 minutes.     Goals of Care Treatment Preferences:  Code Status: DNR      Consults:   Consults (From admission, onward)          Status Ordering Provider     Inpatient consult to Cardiology  Once        Provider:  Arturo Jo MD    Completed OMAR BOLTON III            No new Assessment & Plan notes have  been filed under this hospital service since the last note was generated.  Service: Hospital Medicine    Final Active Diagnoses:    Diagnosis Date Noted POA    PRINCIPAL PROBLEM:  Acute ischemic right middle cerebral artery (MCA) stroke [I63.511] 12/28/2023 Yes    Decreased appetite [R63.0] 01/03/2024 Clinically Undetermined    Lower extremity pain, anterior, unspecified laterality [M79.606] 01/02/2024 Yes    Other localized visual field defect, left eye [H53.452] 12/27/2023 Yes    Bruised sole of foot, left, initial encounter [S90.32XA] 12/26/2023 Yes      Problems Resolved During this Admission:    Diagnosis Date Noted Date Resolved POA    Constipation [K59.00] 12/28/2023 01/03/2024 Yes    Transient alteration of awareness [R40.4] 12/26/2023 01/03/2024 Yes    Hypoxemia [R09.02] 12/26/2023 01/03/2024 Yes    Encephalopathy, metabolic [G93.41] 12/26/2023 01/03/2024 Yes    Hyponatremia [E87.1] 12/26/2023 01/03/2024 Yes       Discharged Condition: fair    Disposition: Skilled Nursing Facility    Follow Up:    Patient Instructions:   No discharge procedures on file.    Significant Diagnostic Studies: Labs: CMP   Recent Labs   Lab 01/02/24 0433 01/03/24 0434    136   K 3.3* 3.4*   CO2 25 25   BUN 14.0 12.0   CREATININE 0.60 0.48*   CALCIUM 8.3* 8.6   ALBUMIN 2.7* 2.8*   BILITOT 0.4 0.5   ALKPHOS 76 77   AST 78* 66*   ALT 70* 70*   , CBC   Recent Labs   Lab 01/02/24 0433 01/03/24 0434   WBC 7.02 5.94   HGB 12.8 13.3   HCT 38.1 39.4    349   , and All labs within the past 24 hours have been reviewed  Radiology: X-Ray: CXR: X-Ray Chest 1 View (CXR):   Results for orders placed or performed during the hospital encounter of 12/26/23   X-Ray Chest 1 View    Narrative    EXAMINATION:  XR CHEST 1 VIEW    CLINICAL HISTORY:  f/u on pleural effusion;, .    COMPARISON:  12/28/2023    FINDINGS:  An AP view or more reveals the heart to be mildly enlarged.  The trachea is just left of midline.  Atherosclerosis is seen  within the aorta.  Patchy hazy opacities evident at the mid and lower lungs bilaterally.  Bony structures are osteopenic.      Impression    1. Patchy hazy opacities mid and lower lungs bilaterally suspicious for infiltrate, atelectasis, and pleural reaction  2. Borderline cardiomegaly  3. Atherosclerosis      Electronically signed by: Phill Giles  Date:    01/02/2024  Time:    13:50    and X-Ray Chest PA and Lateral (CXR): No results found for this visit on 12/26/23. and KUB: X-Ray Abdomen AP 1 View (KUB):   Results for orders placed or performed during the hospital encounter of 12/26/23   X-Ray Abdomen AP 1 View    Narrative    EXAMINATION:  XR ABDOMEN AP 1 VIEW    CLINICAL HISTORY:  R/o fecal bolus;, .    COMPARISON:  None available    FINDINGS:  Single AP or more views reveal unusual lucency at the uppermost abdomen.  There is suspect gas distension of the stomach.  Gas distended loops of large and small bowel are identified.  There is a fecal bolus at the rectum.  Degenerative changes and curvature are noted to the thoracolumbar spine.  Bony structures are osteopenic.Gas and feces are seen within the colon.      Impression    1. Unusual lucency noted to the upper most abdomen which may be due to a protruding lower abdomen  2. Gas filled loops of large and small bowel fecal bolus at the rectum  3. Thoracolumbar spondylosis and scoliosis  4. Osteopenia      Electronically signed by: Phill Giles  Date:    12/28/2023  Time:    10:41     CT scan:  CT of the head on admission with right-sided MCA CVA ischemic  Ultrasound:  See cardiac echo    Pending Diagnostic Studies:       None           Medications:  Transfer Medications (for Discharge Readmit only):   No current facility-administered medications for this encounter.     Current Outpatient Medications   Medication Sig Dispense Refill    aspirin (ECOTRIN) 81 MG EC tablet Take 81 mg by mouth every evening. Stopped 9/21/23      gabapentin (NEURONTIN) 300 MG  capsule Take 300 mg by mouth 2 (two) times daily.      acetaminophen (TYLENOL) 325 MG tablet Take 2 tablets (650 mg total) by mouth every 6 (six) hours as needed.  0    albuterol-ipratropium (DUO-NEB) 2.5 mg-0.5 mg/3 mL nebulizer solution Take 3 mLs by nebulization every 6 (six) hours as needed for Wheezing. Rescue 75 mL 0    atorvastatin (LIPITOR) 40 MG tablet Take 1 tablet (40 mg total) by mouth once daily. 90 tablet 3    clopidogreL (PLAVIX) 75 mg tablet Take 1 tablet (75 mg total) by mouth once daily. 30 tablet 11    famotidine (PEPCID) 20 MG tablet Take 1 tablet (20 mg total) by mouth once daily. 30 tablet 11    HYDROcodone-acetaminophen (NORCO) 7.5-325 mg per tablet Take 1 tablet by mouth every 6 (six) hours as needed for Pain. 30 tablet 0    polyethylene glycol (GLYCOLAX) 17 gram PwPk Take 17 g by mouth once daily.  0    potassium chloride SA (K-DUR,KLOR-CON) 20 MEQ tablet Take 1 tablet (20 mEq total) by mouth once daily. for 3 days 3 tablet 0    sertraline (ZOLOFT) 25 MG tablet Take 1 tablet (25 mg total) by mouth once daily. 30 tablet 11     Facility-Administered Medications Ordered in Other Encounters   Medication Dose Route Frequency Provider Last Rate Last Admin    lactated ringers infusion   Intravenous Continuous Bayron Martines DO           Indwelling Lines/Drains at time of discharge:   Lines/Drains/Airways       Drain  Duration             Female External Urinary Catheter w/ Suction 12/30/23 4 days                    Time spent on the discharge of patient:  30 minutes minutes         Tomer Samaniego III, MD  Department of Hospital Medicine  [facility] - Medical Surgical Unit

## 2024-01-04 NOTE — HOSPITAL COURSE
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  Please see my admit H&P for full historical details.  Essentially she presented with a left-sided neglect as well as a left upper extremity weakness.  She also presented hypoxemic for unclear etiology as well.  I consider she might have had an aspiration pneumonia versus aspiration pneumonitis was kept on some azithromycin and Rocephin for weak but recovered from this well without any complications.      Patient was treated for her stroke acutely and monitored conservatively.  Family was involved in these discussions.  She had a CT that was done a proximally 48 hours later of the head without IV contrast that showed relative stability but without hemorrhagic transformation stroke.  Aspirin Plavix atorvastatin 40 mg were given daily and she showed signs of clinical stability over the next 48-72 hours.  She started to have less upper extremity weakness on the left side; and today and yesterday I saw that she was having left sided neglect improvement even the may still be present to some degree.    She had a negative carotid ultrasound without any blockages.  She did have an echocardiogram which showed some moderate to severe aortic stenosis and if she does well as an outpatient will consider having Cardiology look at this a little further.  Please see their notes for documentation.    The patient also presented with some hyponatremia.  Her sodium was a proximally 129 when she was admitted she failed volume resuscitation had to put her on hot salt for about 72 hours.  She responded this well and maintain her sodium to a level 135.  She was not on any known medications besides perhaps rare cases of gabapentin causing this.  She did not show signs of heart failure and again, she seemed to be transiently hypovolemic  hyponatremia.  She did sustain a fall that the family thinks to the head that was confirmed with some leg trauma on the contralateral side but that was negative for orthopedic injury.  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  Did she resolve this?  Regardless I think we reset her Osmo stat and she is doing well.  I was careful to restart her Neurontin and her adding of sertraline for appetite will have to follow up on this as an outpatient.  Of note, tramadol can cause some of this is well.  She was taken that most recently but not every day    Her hospital course was further complicated by her lower extremity pain.  She has has a known L5 root radicular lesion that failed 1 epidural steroid injection done here at the hospital.  As an outpatient prior to her stroke we were planning on getting her another KEERTHI but she failed to make it that far.  Dr. Sierra, my partner over the weekend placed her on some Norco and that seemed to help because the patient was in such painful stress/duress.  Will discharge on p.r.n. q.6 Norco as discussed at length with the family in terms of the pros and cons and risks and benefits of this medication.  They understand and agree.    Otherwise we all discerned that getting her to the skilled nursing facility was in her best interest as opposed to a lingering here more in the hospital.    Will therefore discharge her to the skilled nursing facility with re-initiated Neurontin b.i.d., added sertraline for appetite since it is decreased during the hospitalization.    Will get a repeat CMP in the next week when she is restarted on all these medications.  And will follow up in my office in 1-2 weeks.      Lengthy discussion with the daughter Noreen.  She is in agreement with the plan.  Time spent today 30 minutes.

## 2024-01-18 ENCOUNTER — HOSPITAL ENCOUNTER (INPATIENT)
Facility: HOSPITAL | Age: 89
LOS: 12 days | Discharge: NURSING, REHAB OR LONG-TERM CARE | DRG: 085 | End: 2024-01-30
Attending: EMERGENCY MEDICINE | Admitting: INTERNAL MEDICINE
Payer: MEDICARE

## 2024-01-18 DIAGNOSIS — S06.5XAA SDH (SUBDURAL HEMATOMA): Primary | ICD-10-CM

## 2024-01-18 DIAGNOSIS — Z66 DO NOT RESUSCITATE STATUS: ICD-10-CM

## 2024-01-18 DIAGNOSIS — I63.9 ISCHEMIC STROKE: ICD-10-CM

## 2024-01-18 DIAGNOSIS — S01.01XA LACERATION OF SCALP, INITIAL ENCOUNTER: ICD-10-CM

## 2024-01-18 DIAGNOSIS — Z79.02 ANTIPLATELET OR ANTITHROMBOTIC LONG-TERM USE: ICD-10-CM

## 2024-01-18 DIAGNOSIS — S06.36AA TRAUMATIC INTRACEREBRAL HEMORRHAGE WITH UNKNOWN LOSS OF CONSCIOUSNESS STATUS, UNSPECIFIED LATERALITY, INITIAL ENCOUNTER: ICD-10-CM

## 2024-01-18 DIAGNOSIS — R54 ADVANCED AGE: ICD-10-CM

## 2024-01-18 PROBLEM — U07.1 COVID-19: Status: ACTIVE | Noted: 2024-01-18

## 2024-01-18 LAB
ALBUMIN SERPL-MCNC: 3.3 G/DL (ref 3.4–4.8)
ALBUMIN/GLOB SERPL: 1.2 RATIO (ref 1.1–2)
ALP SERPL-CCNC: 96 UNIT/L (ref 40–150)
ALT SERPL-CCNC: 49 UNIT/L (ref 0–55)
AMORPH URATE CRY URNS QL MICRO: ABNORMAL /HPF
APPEARANCE UR: CLEAR
APTT PPP: 30.3 SECONDS (ref 24.6–37.2)
AST SERPL-CCNC: 44 UNIT/L (ref 5–34)
BACTERIA #/AREA URNS AUTO: ABNORMAL /HPF
BASOPHILS # BLD AUTO: 0.06 X10(3)/MCL
BASOPHILS NFR BLD AUTO: 0.7 %
BILIRUB SERPL-MCNC: 0.4 MG/DL
BILIRUB UR QL STRIP.AUTO: NEGATIVE
BUN SERPL-MCNC: 14 MG/DL (ref 9.8–20.1)
CALCIUM SERPL-MCNC: 8.9 MG/DL (ref 8.4–10.2)
CHLORIDE SERPL-SCNC: 98 MMOL/L (ref 98–111)
CO2 SERPL-SCNC: 26 MMOL/L (ref 23–31)
COLOR UR AUTO: YELLOW
CREAT SERPL-MCNC: 0.57 MG/DL (ref 0.55–1.02)
EOSINOPHIL # BLD AUTO: 0.05 X10(3)/MCL (ref 0–0.9)
EOSINOPHIL NFR BLD AUTO: 0.6 %
ERYTHROCYTE [DISTWIDTH] IN BLOOD BY AUTOMATED COUNT: 13.3 % (ref 11.5–17)
FLUAV AG UPPER RESP QL IA.RAPID: NOT DETECTED
FLUBV AG UPPER RESP QL IA.RAPID: NOT DETECTED
GFR SERPLBLD CREATININE-BSD FMLA CKD-EPI: >60 MLS/MIN/1.73/M2
GLOBULIN SER-MCNC: 2.8 GM/DL (ref 2.4–3.5)
GLUCOSE SERPL-MCNC: 111 MG/DL (ref 75–121)
GLUCOSE UR QL STRIP.AUTO: NEGATIVE
HCT VFR BLD AUTO: 35.3 % (ref 37–47)
HGB BLD-MCNC: 11.9 G/DL (ref 12–16)
IMM GRANULOCYTES # BLD AUTO: 0.04 X10(3)/MCL (ref 0–0.04)
IMM GRANULOCYTES NFR BLD AUTO: 0.5 %
INR PPP: 1
KETONES UR QL STRIP.AUTO: NEGATIVE
LEUKOCYTE ESTERASE UR QL STRIP.AUTO: NEGATIVE
LYMPHOCYTES # BLD AUTO: 0.43 X10(3)/MCL (ref 0.6–4.6)
LYMPHOCYTES NFR BLD AUTO: 5.4 %
MCH RBC QN AUTO: 32 PG (ref 27–31)
MCHC RBC AUTO-ENTMCNC: 33.7 G/DL (ref 33–36)
MCV RBC AUTO: 94.9 FL (ref 80–94)
MONOCYTES # BLD AUTO: 0.83 X10(3)/MCL (ref 0.1–1.3)
MONOCYTES NFR BLD AUTO: 10.3 %
NEUTROPHILS # BLD AUTO: 6.62 X10(3)/MCL (ref 2.1–9.2)
NEUTROPHILS NFR BLD AUTO: 82.5 %
NITRITE UR QL STRIP.AUTO: NEGATIVE
PH UR STRIP.AUTO: 6.5 [PH]
PLATELET # BLD AUTO: 207 X10(3)/MCL (ref 130–400)
PMV BLD AUTO: 8.9 FL (ref 7.4–10.4)
POTASSIUM SERPL-SCNC: 4.1 MMOL/L (ref 3.5–5.1)
PROT SERPL-MCNC: 6.1 GM/DL (ref 5.8–7.6)
PROT UR QL STRIP.AUTO: NEGATIVE
PROTHROMBIN TIME: 13.1 SECONDS (ref 12.5–14.5)
RBC # BLD AUTO: 3.72 X10(6)/MCL (ref 4.2–5.4)
RBC #/AREA URNS AUTO: ABNORMAL /HPF
RBC UR QL AUTO: ABNORMAL
SARS-COV-2 RNA RESP QL NAA+PROBE: DETECTED
SODIUM SERPL-SCNC: 131 MMOL/L (ref 132–146)
SP GR UR STRIP.AUTO: 1.02 (ref 1–1.03)
SQUAMOUS #/AREA URNS AUTO: ABNORMAL /HPF
TROPONIN I SERPL-MCNC: 0.03 NG/ML (ref 0–0.04)
TSH SERPL-ACNC: 0.67 UIU/ML (ref 0.35–4.94)
UROBILINOGEN UR STRIP-ACNC: 0.2
WBC # SPEC AUTO: 8.03 X10(3)/MCL (ref 4.5–11.5)
WBC #/AREA URNS AUTO: ABNORMAL /HPF

## 2024-01-18 PROCEDURE — 0HQ0XZZ REPAIR SCALP SKIN, EXTERNAL APPROACH: ICD-10-PCS | Performed by: INTERNAL MEDICINE

## 2024-01-18 PROCEDURE — 81003 URINALYSIS AUTO W/O SCOPE: CPT | Performed by: EMERGENCY MEDICINE

## 2024-01-18 PROCEDURE — 85610 PROTHROMBIN TIME: CPT | Performed by: EMERGENCY MEDICINE

## 2024-01-18 PROCEDURE — 85025 COMPLETE CBC W/AUTO DIFF WBC: CPT | Performed by: EMERGENCY MEDICINE

## 2024-01-18 PROCEDURE — 0240U COVID/FLU A&B PCR: CPT | Performed by: INTERNAL MEDICINE

## 2024-01-18 PROCEDURE — 85730 THROMBOPLASTIN TIME PARTIAL: CPT | Performed by: EMERGENCY MEDICINE

## 2024-01-18 PROCEDURE — 12002 RPR S/N/AX/GEN/TRNK2.6-7.5CM: CPT

## 2024-01-18 PROCEDURE — 27000207 HC ISOLATION

## 2024-01-18 PROCEDURE — 84443 ASSAY THYROID STIM HORMONE: CPT | Performed by: EMERGENCY MEDICINE

## 2024-01-18 PROCEDURE — 99285 EMERGENCY DEPT VISIT HI MDM: CPT | Mod: 25

## 2024-01-18 PROCEDURE — 94761 N-INVAS EAR/PLS OXIMETRY MLT: CPT

## 2024-01-18 PROCEDURE — 80053 COMPREHEN METABOLIC PANEL: CPT | Performed by: EMERGENCY MEDICINE

## 2024-01-18 PROCEDURE — 21400001 HC TELEMETRY ROOM

## 2024-01-18 PROCEDURE — 84484 ASSAY OF TROPONIN QUANT: CPT | Performed by: EMERGENCY MEDICINE

## 2024-01-18 PROCEDURE — 25000003 PHARM REV CODE 250: Performed by: INTERNAL MEDICINE

## 2024-01-18 PROCEDURE — 02HV33Z INSERTION OF INFUSION DEVICE INTO SUPERIOR VENA CAVA, PERCUTANEOUS APPROACH: ICD-10-PCS | Performed by: INTERNAL MEDICINE

## 2024-01-18 PROCEDURE — 11000001 HC ACUTE MED/SURG PRIVATE ROOM

## 2024-01-18 RX ORDER — GABAPENTIN 300 MG/1
300 CAPSULE ORAL 2 TIMES DAILY
Status: DISCONTINUED | OUTPATIENT
Start: 2024-01-18 | End: 2024-01-30 | Stop reason: HOSPADM

## 2024-01-18 RX ORDER — POLYETHYLENE GLYCOL 3350 17 G/17G
17 POWDER, FOR SOLUTION ORAL DAILY
Status: DISCONTINUED | OUTPATIENT
Start: 2024-01-19 | End: 2024-01-18

## 2024-01-18 RX ORDER — ACETAMINOPHEN 325 MG/1
650 TABLET ORAL EVERY 6 HOURS PRN
Status: DISCONTINUED | OUTPATIENT
Start: 2024-01-18 | End: 2024-01-25

## 2024-01-18 RX ORDER — SODIUM CHLORIDE 0.9 % (FLUSH) 0.9 %
10 SYRINGE (ML) INJECTION
Status: DISCONTINUED | OUTPATIENT
Start: 2024-01-18 | End: 2024-01-30 | Stop reason: HOSPADM

## 2024-01-18 RX ORDER — GABAPENTIN 300 MG/1
300 CAPSULE ORAL 2 TIMES DAILY
Status: DISCONTINUED | OUTPATIENT
Start: 2024-01-18 | End: 2024-01-18

## 2024-01-18 RX ORDER — BISACODYL 10 MG/1
10 SUPPOSITORY RECTAL DAILY PRN
Status: DISCONTINUED | OUTPATIENT
Start: 2024-01-18 | End: 2024-01-30 | Stop reason: HOSPADM

## 2024-01-18 RX ORDER — ATORVASTATIN CALCIUM 40 MG/1
40 TABLET, FILM COATED ORAL DAILY
Status: DISCONTINUED | OUTPATIENT
Start: 2024-01-19 | End: 2024-01-18

## 2024-01-18 RX ORDER — SODIUM CHLORIDE 9 MG/ML
INJECTION, SOLUTION INTRAVENOUS CONTINUOUS
Status: DISCONTINUED | OUTPATIENT
Start: 2024-01-18 | End: 2024-01-19

## 2024-01-18 RX ORDER — POLYETHYLENE GLYCOL 3350 17 G/17G
17 POWDER, FOR SOLUTION ORAL DAILY
Status: DISCONTINUED | OUTPATIENT
Start: 2024-01-19 | End: 2024-01-30 | Stop reason: HOSPADM

## 2024-01-18 RX ORDER — ACETAMINOPHEN 325 MG/1
650 TABLET ORAL EVERY 6 HOURS PRN
Status: DISCONTINUED | OUTPATIENT
Start: 2024-01-18 | End: 2024-01-18

## 2024-01-18 RX ORDER — IPRATROPIUM BROMIDE AND ALBUTEROL SULFATE 2.5; .5 MG/3ML; MG/3ML
3 SOLUTION RESPIRATORY (INHALATION) EVERY 6 HOURS PRN
Status: DISCONTINUED | OUTPATIENT
Start: 2024-01-18 | End: 2024-01-30 | Stop reason: HOSPADM

## 2024-01-18 RX ORDER — FAMOTIDINE 20 MG/1
20 TABLET, FILM COATED ORAL DAILY
Status: DISCONTINUED | OUTPATIENT
Start: 2024-01-19 | End: 2024-01-19

## 2024-01-18 RX ORDER — ATORVASTATIN CALCIUM 40 MG/1
40 TABLET, FILM COATED ORAL DAILY
Status: DISCONTINUED | OUTPATIENT
Start: 2024-01-19 | End: 2024-01-30 | Stop reason: HOSPADM

## 2024-01-18 RX ORDER — FAMOTIDINE 20 MG/1
20 TABLET, FILM COATED ORAL DAILY
Status: DISCONTINUED | OUTPATIENT
Start: 2024-01-19 | End: 2024-01-18

## 2024-01-18 RX ADMIN — SODIUM CHLORIDE: 9 INJECTION, SOLUTION INTRAVENOUS at 10:01

## 2024-01-18 NOTE — Clinical Note
Diagnosis: SDH (subdural hematoma) [883752]   Future Attending Provider: OMAR BOLTON III [[id number]]   Reason for IP Medical Treatment  (Clinical interventions that can only be accomplished in the IP setting? ) :: BRAIN BLEED   I certify that Inpatient services for greater than or equal to 2 midnights are medically necessary:: Yes   Plans for Post-Acute care--if anticipated (pick the single best option):: D. Skilled Nursing Placement

## 2024-01-18 NOTE — ED PROVIDER NOTES
Encounter Date: 1/18/2024       History     Chief Complaint   Patient presents with    Fall     Sent from [facility] for unwitnessed fall with laceration to posterior head. PT on Plavix. Unknown LOC. Pt is also Covid +     A 89-year-old female who resides at a local nursing home.  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  Since that time she has been at the nursing home for rehab.  She is a do not resuscitate.  She tells us she is ready go to [facility].  Today she had an unwitnessed fall they found her down on the ground with bleeding from the left side occiput area of her head.  She ons anti platelet drugs Plavix   She has a mild amount of confusion normally.  She is awake oriented speaking she does have some mild dementia.  She is followed by Dr. Tomer Samaniego  I have spoken to her daughter Lyly who lives in [facility] via the phone she says her mother is a do not resuscitate patient she says her mother does not want any heroic measures done.  She said she does not want her mother to have any type of neurosurgery.  She asked me to go speak to the patient directly and asked her if she wants to have neurosurgery I did go in the room spoke to the patient per the daughter's instructions the patient adamantly declined any type of surgery neurosurgery or otherwise.  She does not want to be resuscitated have any heroic measures.  I then called and spoke to the internist of record Dr. Tomer Samaniego.  He said if the patient does not want surgery and  the family is comfortable with that he is willing to keep the patient here   was living independently till the stroke now is in the rehab and our nursing unit.      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      Past Surgical History:   Procedure  "Laterality Date    BACK SURGERY      COLONOSCOPY      X 2    EPIDURAL STEROID INJECTION INTO LUMBAR SPINE Right 10/20/2023    Procedure: INJECTION, STEROID, SPINE, LUMBAR, EPIDURAL (Right L5- S1 ILESI);  Surgeon: Amrit Song MD;  Location: [facility];  Service: Pain Management;  Laterality: Right;    TONSILLECTOMY      TRANSFORAMINAL EPIDURAL INJECTION OF STEROID Right 09/22/2023    Procedure: INJECTION, STEROID, EPIDURAL, TRANSFORAMINAL APPROACH (Right TFESI L4 & L5);  Surgeon: Amrit Song MD;  Location: [facility] OR;  Service: Pain Management;  Laterality: Right;     Family History   Problem Relation Age of Onset    No Known Problems Mother     Heart attack Father      Social History     Tobacco Use    Smoking status: Never    Smokeless tobacco: Never   Substance Use Topics    Alcohol use: Not Currently    Drug use: Never     Review of Systems   Constitutional: Negative.         The patient who does not remember the fall tells me there is nothing wrong with her accept her legs hurt she says "now that is a chronic problem it is not new today"   HENT: Negative.     Eyes: Negative.    Respiratory: Negative.     Cardiovascular: Negative.    Gastrointestinal: Negative.    Endocrine: Negative.    Genitourinary: Negative.    Musculoskeletal: Negative.    Skin:  Positive for wound.   Allergic/Immunologic: Negative.    Neurological: Negative.    Hematological: Negative.    Psychiatric/Behavioral: Negative.     All other systems reviewed and are negative.      Physical Exam     Initial Vitals [01/18/24 1235]   BP Pulse Resp Temp SpO2   (!) 182/86 83 18 98.5 °F (36.9 °C) 96 %      MAP       --         Physical Exam    Nursing note and vitals reviewed.  Constitutional: She appears well-developed and well-nourished.   HENT:   Head: Normocephalic.   Right Ear: Tympanic membrane and external ear normal.   Left Ear: Tympanic membrane and external ear normal.   Nose: Nose normal.   Mouth/Throat: Oropharynx is clear and moist and " mucous membranes are normal.   There is a proximally 4 cm scalp laceration left occipital.  macerated edges  total with minimum bleeding it is somewhat irregular and jagged   Eyes: EOM are normal. Pupils are equal, round, and reactive to light.   Neck: Neck supple. No thyromegaly present. No tracheal deviation present. No JVD present.   There is no palpable step-off in the neck the C-collar was left in place until the CT resulted it was then removed.  Flexion-extension of neck is normal.   Normal range of motion.  Cardiovascular:  Normal rate, regular rhythm and normal heart sounds.     Exam reveals no gallop and no friction rub.       No murmur heard.  Pulmonary/Chest: Breath sounds normal. No stridor. No respiratory distress. She has no wheezes. She has no rhonchi. She has no rales. She exhibits no tenderness.   Abdominal: Abdomen is soft. Bowel sounds are normal. She exhibits no distension and no mass. There is no abdominal tenderness.   Genitourinary:    Genitourinary Comments: No CVA tenderness noted     Musculoskeletal:         General: No tenderness or edema. Normal range of motion.      Cervical back: Normal range of motion and neck supple.     Lymphadenopathy:     She has no cervical adenopathy.   Neurological: She is alert. She has normal strength and normal reflexes. No cranial nerve deficit. GCS score is 15. GCS eye subscore is 4. GCS verbal subscore is 5. GCS motor subscore is 6.   She is oriented to her name she has not sure about what happened she has not sure where she is at she is quite pleasant to speak with and very well-spoken  There are no focal  deficits shoulders elbows wrist and hands normal distal little bit of global dementia   Skin: Skin is warm and dry. Capillary refill takes less than 2 seconds. No rash noted.   Psychiatric: She has a normal mood and affect. Thought content normal.         ED Course   Lac Repair    Date/Time: 1/18/2024 2:39 PM    Performed by: Phill Nixon,  MD  Authorized by: Tomer Samaniego III, MD    Consent:     Consent obtained:  Verbal    Consent given by:  Patient    Risks, benefits, and alternatives were discussed: yes      Risks discussed:  Pain  Universal protocol:     Procedure explained and questions answered to patient or proxy's satisfaction: yes      Patient identity confirmed:  Verbally with patient  Anesthesia:     Anesthesia method:  Local infiltration    Local anesthetic:  Lidocaine 1% WITH epi  Laceration details:     Location:  Scalp    Scalp location:  Occipital    Length (cm):  5    Depth (mm):  0.5  Pre-procedure details:     Preparation:  Patient was prepped and draped in usual sterile fashion  Exploration:     Limited defect created (wound extended): yes      Hemostasis achieved with:  Epinephrine    Contaminated: no    Treatment:     Area cleansed with:  Povidone-iodine    Amount of cleaning:  Extensive    Irrigation solution:  Sterile saline    Irrigation volume:  500    Irrigation method:  Syringe    Visualized foreign bodies/material removed: no      Debridement:  None    Undermining:  None    Scar revision: no    Skin repair:     Repair method:  Staples    Number of staples:  9  Approximation:     Approximation:  Close  Repair type:     Repair type:  Simple  Post-procedure details:     Dressing:  Open (no dressing)    Procedure completion:  Tolerated  Comments:      Jagged scalp laceration with macerated edges.  We tried to approximated his best we could close stapling was used to achieve achieve hemostasis  Critical Care    Date/Time: 1/18/2024 2:41 PM    Performed by: Phill Nixon MD  Authorized by: Tomer Samaniego III, MD  Direct patient critical care time: 10 minutes  Additional history critical care time: 6 minutes  Ordering / reviewing critical care time: 6 minutes  Documentation critical care time: 7 minutes  Consulting other physicians critical care time: 7 minutes  Consult with family critical care time: 5  minutes  Total critical care time (exclusive of procedural time) : 41 minutes  Critical care time was exclusive of separately billable procedures and treating other patients.  Critical care was necessary to treat or prevent imminent or life-threatening deterioration of the following conditions: CNS failure or compromise.  Critical care was time spent personally by me on the following activities: discussions with primary provider, examination of patient, review of old charts and re-evaluation of patient's condition.        Labs Reviewed   COMPREHENSIVE METABOLIC PANEL - Abnormal; Notable for the following components:       Result Value    Sodium Level 131 (*)     Albumin Level 3.3 (*)     Aspartate Aminotransferase 44 (*)     All other components within normal limits   CBC WITH DIFFERENTIAL - Abnormal; Notable for the following components:    RBC 3.72 (*)     Hgb 11.9 (*)     Hct 35.3 (*)     MCV 94.9 (*)     MCH 32.0 (*)     Lymph # 0.43 (*)     All other components within normal limits   APTT - Normal   PROTIME-INR - Normal   TROPONIN I - Normal   CBC W/ AUTO DIFFERENTIAL    Narrative:     The following orders were created for panel order CBC auto differential.  Procedure                               Abnormality         Status                     ---------                               -----------         ------                     CBC with Differential[[id number]]       Abnormal            Final result                 Please view results for these tests on the individual orders.   TSH   URINALYSIS, REFLEX TO URINE CULTURE          Imaging Results              CT Chest Without Contrast (Final result)  Result time 01/18/24 14:15:06      Final result by Phill Giles MD (01/18/24 14:15:06)                   Impression:      1. Considerable gas distension of the upper and mid esophagus measuring up to 4 cm in diameter suspicious for presbyesophagus  2. Small left pleural effusion with associated infiltrate and  atelectasis at the left lung base  3. Smaller right basilar pleural reaction with associated infiltrate and atelectasis  4. Nodular wedge-shaped atelectasis and or scarring at the lung apices with bronchiectasis at the right apex.  5. Atherosclerosis with prominent ascending aorta  6. Thoracic spondylosis with anterior wedge compression deformity at T7      Electronically signed by: Phill Giles  Date:    01/18/2024  Time:    14:15               Narrative:    EXAMINATION:  CT CHEST WITHOUT CONTRAST    CLINICAL HISTORY:  FALL;, .    TECHNIQUE:  PATIENT RADIATION DOSE: DLP(mGycm) 263    As per PQRS measures, all CT scans at this facility used dose modulation, iterative reconstruction, and/or weight based dose adjustment when appropriate to reduce radiation dose to as low as reasonably achievable.    COMPARISON:  None available    FINDINGS:  Degenerative changes are noted to the thoracic spine.  There is anterior wedge compression deformity at T7 without the 13 50 loss of anterior vertebral body height.  There is low-attenuation heterogeneity of the thyroid lobes bilaterally.  There is considerable gaseous distension of the upper and mid esophagus suggesting presbyesophagus measuring up the 4 cm in diameter.  Atherosclerosis is seen within the aorta and branching vessels.  There is enlargement of the ascending aorta measuring 3.6 cm in AP diameter.  The heart is normal in size.  Trace pericardial effusion is present.  A small left pleural effusion is identified with associated infiltrate and atelectasis at the left lower lung.  The airways are grossly patent.  Patchy wedge-shaped nodular foci are evident at the apices bilaterally with the right larger than the left.  There is associated bronchiectatic changes at the right apex.  Atelectasis and/or scarring is evident posterior right upper lung.  A smaller pleural reaction is noted to the posterior right lung base with minimal infiltrate and or atelectasis.                                        CT Head Without Contrast (Final result)  Result time 01/18/24 13:34:58      Final result by Phill Giles MD (01/18/24 13:34:58)                   Impression:      1. Interim development of a lobulated sub dural and suspect epidural hematomas along the right parietal and posterior convexity containing acute and nonacute the components measuring up to 1.5 cm in thickness  2. Interim development of scattered small parenchymal hemorrhages at the posterior right parietal lobe and right occipital lobe as well as a small bleed at the anterior right frontal lobe  3. Parenchymal defect/old infarct/encephalomalacia in the distribution of the right middle cerebral artery  4. Generalized cerebral and cerebellar atrophy  5. Intracranial atherosclerosis  6. Scattered hypodensities at the periventricular white matter suspicious for small vessel ischemic changes  7. Interim development of a scalp normal and soft tissue emphysema overlying the left occipital  8. The emergency department physician was notified of the findings at 13:10.      Electronically signed by: Phill Giles  Date:    01/18/2024  Time:    13:34               Narrative:    EXAMINATION:  CT HEAD WITHOUT CONTRAST    CLINICAL HISTORY:  Fall;, .    TECHNIQUE:  PATIENT RADIATION DOSE: DLP(mGycm) 111 5    As per PQRS measures, all CT scans at this facility used dose modulation, iterative reconstruction, and/or weight based dose adjustment when appropriate to reduce radiation dose to as low as reasonably achievable.    COMPARISON:  12/28/2023    FINDINGS:  Serial axial images were obtained of the head without the administration of IV contrast. Both brain and bone parenchymal windows were obtained.  Additional coronal and sagittal reconstructions were obtained.  Ventricles, cisterns, and sulci are prominent in size.  There is interim development of a bilobed sub dural and suspect epidural hematoma at the right lateral and posterior  convexity.  The are acute and subacute components within the hematomas.  The hematoma measures up to 1.5 cm in thickness.  Scattered parenchymal hemorrhages are evident at the right posterior parietal lobe and right occipital lobe and at the anterior right frontal lobe.  There is again low-attenuation/parenchymal defect/encephalomalacia at the distribution of the right middle cerebral artery.  No significant midline shift is appreciated.  There is again sulcal effacement at the right parietal simple lobe.  Scattered hypodensities are again seen within the periventricular white matter.  Intracranial atherosclerosis is seen.  Visualized portions the paranasal sinuses and mastoid air cells are relatively clear.  External auditory canals are grossly patent.  No acute fracture is seen.  There is a scalp hematoma with soft tissue emphysema overlying the left occipital bone.                                       CT Cervical Spine Without Contrast (Final result)  Result time 01/18/24 13:42:02      Final result by Phill Giles MD (01/18/24 13:42:02)                   Impression:      1. Slight retrolisthesis of C5 on C6  2. Moderate to advanced cervical spondylosis  3. Osteopenia  4. Levoconvexity of the cervical spine  5. Multilevel mild to severe encroachment into the neural foramina and to a lesser extent into the central spinal canal as described  6. Unusual considerable gaseous distension of the upper esophagus incompletely included on the exam.  CT exam of the chest would allow further evaluation.      Electronically signed by: Phill Giles  Date:    01/18/2024  Time:    13:42               Narrative:    EXAMINATION:  CT CERVICAL SPINE WITHOUT CONTRAST    CLINICAL HISTORY:  , Fall;    TECHNIQUE,:  PATIENT RADIATION DOSE: DLP(mGycm) 1115    As per PQRS measures, all CT scans at this facility used dose modulation, iterative reconstruction, and/or weight based dose adjustment when appropriate to reduce radiation  dose to as low as reasonably achievable.    COMPARISON:  None available    FINDINGS:  Serial axial images were obtained of the cervical spine with the administration of intravenous contrast.  Additional coronal and sagittal images were performed.  Both soft tissue and bone windows were obtained. Vertebral body height is grossly intact.  There is slight retrolisthesis of C5 on C6.  No fracture is seen.  There is no loss of integrity to the bony spinal canal.  Scattered small subchondral cyst formation is noted to the vertebral bodies including the odontoid process.  Disc space narrowing, osteophyte formation, and facet arthrosis are identified.  Degenerative endplate changes are evident at C5-6.  Bony structures are osteopenic.  There is multilevel mild to severe encroachment into the neural foramina and to a lesser extent the central spinal canal secondary to posterior osteophyte formation, facet arthrosis, and poorly visualized posterior disc bulge.  There is curvature of the cervical spine with the convexity to the left.  There is low-attenuation heterogeneity of the thyroid lobes.  There is a nodular wedge-shaped atelectasis and or scarring the lung apices.  There is unusual considerable gaseous distension of the upper esophagus measuring 4 cm in diameter.  Atherosclerosis is seen within the carotid bulbs.                                       Medications - No data to display  Medical Decision Making    A 89-year-old female who resides at a local nursing home.  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  Since that time she has been at the nursing home for rehab.  She is a do not resuscitate.  She tells us she is ready go to [facility].  Today she had an unwitnessed fall they found her down on the ground with bleeding from the left side occiput area of her head.  She ons anti platelet drugs Plavix   She has a mild amount of  confusion normally.  She is awake oriented speaking she does have some mild dementia.  She is followed by Dr. Tomer Samaniego  I have spoken to her daughter Lyly who lives in [facility] via the phone she says her mother is a do not resuscitate patient she says her mother does not want any heroic measures done.  She said she does not want her mother to have any type of neurosurgery.  She asked me to go speak to the patient directly and asked her if she wants to have neurosurgery I did go in the room spoke to the patient per the daughter's instructions the patient adamantly declined any type of surgery neurosurgery or otherwise.  She does not want to be resuscitated have any heroic measures.  I then called and spoke to the internist of record Dr. Tomer Samaniego.  He said if the patient does not want surgery and  the family is comfortable with that he is willing to keep the patient here      Amount and/or Complexity of Data Reviewed  Independent Historian: EMS  External Data Reviewed: radiology and notes.     Details: Prior CT and notes from recent admission for stroke  Labs: ordered. Decision-making details documented in ED Course.     Details: Reviewed and documented in record  Radiology: ordered and independent interpretation performed.     Details: I saw hemorrhage on the brain CT both parenchymal and probable subdurals acute and chronic subdural  Discussion of management or test interpretation with external provider(s): Differential diagnosis would include posttraumatic intracranial hemorrhage, scalp laceration, concussion, anemia due to scalp laceration bleeding on Plavix, high risk for further bleeding on Plavix.    Risk  Decision regarding hospitalization.  Diagnosis or treatment significantly limited by social determinants of health.  Risk Details: MDM  Problems addressed  Co-morbidities and/or factors adding to the complexity or risk for the patient:  Plavix therapy  Problems addressed:  Fall with scalp laceration  and intracranial hemorrhage mixed subdural hematomas and small parenchymal hemorrhage  Acute problem/illness or progression/exacerbation of chronic problem with potential threat to life/bodily dysfunction?:  Bleeding in head on Plavix  Differential diagnoses/problems considered: see above     Amount and/or Complexity of Data Reviewed  Independent Historian: daughter  (see above for summary)  External Data Reviewed: notes from previous admissions (see above for summary)  Risk and benefits of testing: discussed   Labs: Labs: ordered and reviewed  Radiology:Radiology: ordered and independent interpretation performed (see above or ED course)  ECG/medicine tests:Radiology: ordered and independent interpretation performed (see above or ED course)  discussed with primary care physician discussed with radiologist's    Risk  Decision regarding hospitalization  Diagnosis or treatment significantly impacted by social determinants of health: none   Shared decision making     Critical Care  30-74 minutes      Critical Care  Total time providing critical care: 41 minutes               ED Course as of 01/18/24 1449   Thu Jan 18, 2024   1445 White cell count 8.03.  Hemoglobin 11.9 hematocrit 35.3 PT INR 13.11.0 PTT 30.3 platelet count 770700.  Sodium is 131 potassium is 4.1 albumin is 3.3 AST is [DM]   1446 After discussion with primary care doctor after discussion with the daughter we feel patient can be treated here they do not want any surgeries both patient and family are adamant no brain surgery patient will be admitted here for expectant care.  Scalp wound has been closed with staples  I did review the CT of the chest.  She has dilated upper esophagus probably chronic in nature brain CT showed the subdurals acute on chronic as well as small parenchymal hemorrhage [DM]      ED Course User Index  [DM] Phill Nixon MD                           Clinical Impression:  Final diagnoses:  [S06.5XAA] SDH (subdural hematoma)  (Primary)  [Z66] Do not resuscitate status  [S06.36AA] Traumatic intracerebral hemorrhage with unknown loss of consciousness status, unspecified laterality, initial encounter  [R54] Advanced age  [S01.01XA] Laceration of scalp, initial encounter  [Z79.02] Antiplatelet or antithrombotic long-term use - Plavix          ED Disposition Condition    Admit Stable                Phill Nixon MD  01/18/24 1834

## 2024-01-19 LAB
ALBUMIN SERPL-MCNC: 3.2 G/DL (ref 3.4–4.8)
ALBUMIN/GLOB SERPL: 1.1 RATIO (ref 1.1–2)
ALP SERPL-CCNC: 81 UNIT/L (ref 40–150)
ALT SERPL-CCNC: 44 UNIT/L (ref 0–55)
ANION GAP SERPL CALC-SCNC: 7 MEQ/L
AST SERPL-CCNC: 40 UNIT/L (ref 5–34)
BASOPHILS # BLD AUTO: 0.01 X10(3)/MCL
BASOPHILS NFR BLD AUTO: 0.2 %
BILIRUB SERPL-MCNC: 0.7 MG/DL
BUN SERPL-MCNC: 12 MG/DL (ref 9.8–20.1)
BUN SERPL-MCNC: 13 MG/DL (ref 9.8–20.1)
CALCIUM SERPL-MCNC: 8.5 MG/DL (ref 8.4–10.2)
CALCIUM SERPL-MCNC: 8.7 MG/DL (ref 8.4–10.2)
CHLORIDE SERPL-SCNC: 102 MMOL/L (ref 98–111)
CHLORIDE SERPL-SCNC: 95 MMOL/L (ref 98–111)
CHOLEST SERPL-MCNC: 176 MG/DL
CHOLEST/HDLC SERPL: 2 {RATIO} (ref 0–5)
CK SERPL-CCNC: 64 U/L (ref 29–168)
CO2 SERPL-SCNC: 23 MMOL/L (ref 23–31)
CO2 SERPL-SCNC: 26 MMOL/L (ref 23–31)
CREAT SERPL-MCNC: 0.54 MG/DL (ref 0.55–1.02)
CREAT SERPL-MCNC: 0.58 MG/DL (ref 0.55–1.02)
CREAT/UREA NIT SERPL: 24
CRP SERPL-MCNC: 50.9 MG/L
D DIMER PPP IA.FEU-MCNC: 17.15 UG/ML FEU (ref 0–0.5)
EOSINOPHIL # BLD AUTO: 0 X10(3)/MCL (ref 0–0.9)
EOSINOPHIL NFR BLD AUTO: 0 %
ERYTHROCYTE [DISTWIDTH] IN BLOOD BY AUTOMATED COUNT: 12.9 % (ref 11.5–17)
ERYTHROCYTE [SEDIMENTATION RATE] IN BLOOD: 25 MM/HR (ref 0–20)
EST. AVERAGE GLUCOSE BLD GHB EST-MCNC: 105.4 MG/DL
FERRITIN SERPL-MCNC: 239.6 NG/ML (ref 4.63–204)
GFR SERPLBLD CREATININE-BSD FMLA CKD-EPI: >60 MLS/MIN/1.73/M2
GFR SERPLBLD CREATININE-BSD FMLA CKD-EPI: >60 MLS/MIN/1.73/M2
GLOBULIN SER-MCNC: 2.9 GM/DL (ref 2.4–3.5)
GLUCOSE SERPL-MCNC: 102 MG/DL (ref 75–121)
GLUCOSE SERPL-MCNC: 135 MG/DL (ref 75–121)
HBA1C MFR BLD: 5.3 %
HCT VFR BLD AUTO: 31.6 % (ref 37–47)
HDLC SERPL-MCNC: 87 MG/DL (ref 35–60)
HGB BLD-MCNC: 10.7 G/DL (ref 12–16)
IMM GRANULOCYTES # BLD AUTO: 0.02 X10(3)/MCL (ref 0–0.04)
IMM GRANULOCYTES NFR BLD AUTO: 0.3 %
LDH SERPL-CCNC: 256 U/L (ref 125–220)
LDLC SERPL CALC-MCNC: 81 MG/DL (ref 50–140)
LYMPHOCYTES # BLD AUTO: 0.37 X10(3)/MCL (ref 0.6–4.6)
LYMPHOCYTES NFR BLD AUTO: 5.8 %
MCH RBC QN AUTO: 31.4 PG (ref 27–31)
MCHC RBC AUTO-ENTMCNC: 33.9 G/DL (ref 33–36)
MCV RBC AUTO: 92.7 FL (ref 80–94)
MONOCYTES # BLD AUTO: 0.55 X10(3)/MCL (ref 0.1–1.3)
MONOCYTES NFR BLD AUTO: 8.6 %
NEUTROPHILS # BLD AUTO: 5.42 X10(3)/MCL (ref 2.1–9.2)
NEUTROPHILS NFR BLD AUTO: 85.1 %
PLATELET # BLD AUTO: 197 X10(3)/MCL (ref 130–400)
PMV BLD AUTO: 8.9 FL (ref 7.4–10.4)
POTASSIUM SERPL-SCNC: 3.6 MMOL/L (ref 3.5–5.1)
POTASSIUM SERPL-SCNC: 3.9 MMOL/L (ref 3.5–5.1)
PROT SERPL-MCNC: 6.1 GM/DL (ref 5.8–7.6)
RBC # BLD AUTO: 3.41 X10(6)/MCL (ref 4.2–5.4)
SODIUM SERPL-SCNC: 129 MMOL/L (ref 132–146)
SODIUM SERPL-SCNC: 130 MMOL/L (ref 132–146)
SODIUM SERPL-SCNC: 132 MMOL/L (ref 132–146)
TRIGL SERPL-MCNC: 41 MG/DL (ref 37–140)
VLDLC SERPL CALC-MCNC: 8 MG/DL
WBC # SPEC AUTO: 6.37 X10(3)/MCL (ref 4.5–11.5)

## 2024-01-19 PROCEDURE — 82550 ASSAY OF CK (CPK): CPT | Performed by: INTERNAL MEDICINE

## 2024-01-19 PROCEDURE — 27000207 HC ISOLATION

## 2024-01-19 PROCEDURE — 80053 COMPREHEN METABOLIC PANEL: CPT | Performed by: INTERNAL MEDICINE

## 2024-01-19 PROCEDURE — 80061 LIPID PANEL: CPT | Performed by: INTERNAL MEDICINE

## 2024-01-19 PROCEDURE — 63600175 PHARM REV CODE 636 W HCPCS: Performed by: INTERNAL MEDICINE

## 2024-01-19 PROCEDURE — 11000001 HC ACUTE MED/SURG PRIVATE ROOM

## 2024-01-19 PROCEDURE — 99900035 HC TECH TIME PER 15 MIN (STAT)

## 2024-01-19 PROCEDURE — 83615 LACTATE (LD) (LDH) ENZYME: CPT | Performed by: INTERNAL MEDICINE

## 2024-01-19 PROCEDURE — 97161 PT EVAL LOW COMPLEX 20 MIN: CPT

## 2024-01-19 PROCEDURE — 82728 ASSAY OF FERRITIN: CPT | Performed by: INTERNAL MEDICINE

## 2024-01-19 PROCEDURE — 21400001 HC TELEMETRY ROOM

## 2024-01-19 PROCEDURE — 83036 HEMOGLOBIN GLYCOSYLATED A1C: CPT | Performed by: INTERNAL MEDICINE

## 2024-01-19 PROCEDURE — 85025 COMPLETE CBC W/AUTO DIFF WBC: CPT | Performed by: INTERNAL MEDICINE

## 2024-01-19 PROCEDURE — 36569 INSJ PICC 5 YR+ W/O IMAGING: CPT

## 2024-01-19 PROCEDURE — 85652 RBC SED RATE AUTOMATED: CPT | Performed by: INTERNAL MEDICINE

## 2024-01-19 PROCEDURE — 84295 ASSAY OF SERUM SODIUM: CPT | Performed by: INTERNAL MEDICINE

## 2024-01-19 PROCEDURE — 94761 N-INVAS EAR/PLS OXIMETRY MLT: CPT

## 2024-01-19 PROCEDURE — 85379 FIBRIN DEGRADATION QUANT: CPT | Performed by: INTERNAL MEDICINE

## 2024-01-19 PROCEDURE — A4217 STERILE WATER/SALINE, 500 ML: HCPCS | Performed by: INTERNAL MEDICINE

## 2024-01-19 PROCEDURE — 86140 C-REACTIVE PROTEIN: CPT | Performed by: INTERNAL MEDICINE

## 2024-01-19 PROCEDURE — 25000003 PHARM REV CODE 250: Performed by: INTERNAL MEDICINE

## 2024-01-19 PROCEDURE — C1751 CATH, INF, PER/CENT/MIDLINE: HCPCS

## 2024-01-19 RX ORDER — MUPIROCIN 20 MG/G
OINTMENT TOPICAL 2 TIMES DAILY
Status: COMPLETED | OUTPATIENT
Start: 2024-01-19 | End: 2024-01-23

## 2024-01-19 RX ORDER — SODIUM CHLORIDE 0.9 % (FLUSH) 0.9 %
10 SYRINGE (ML) INJECTION
Status: DISCONTINUED | OUTPATIENT
Start: 2024-01-19 | End: 2024-01-30 | Stop reason: HOSPADM

## 2024-01-19 RX ORDER — SODIUM CHLORIDE 0.9 % (FLUSH) 0.9 %
10 SYRINGE (ML) INJECTION EVERY 6 HOURS
Status: DISCONTINUED | OUTPATIENT
Start: 2024-01-19 | End: 2024-01-30 | Stop reason: HOSPADM

## 2024-01-19 RX ORDER — PANTOPRAZOLE SODIUM 40 MG/1
40 TABLET, DELAYED RELEASE ORAL DAILY
Status: DISCONTINUED | OUTPATIENT
Start: 2024-01-20 | End: 2024-01-22

## 2024-01-19 RX ADMIN — POLYETHYLENE GLYCOL 3350 17 G: 17 POWDER, FOR SOLUTION ORAL at 09:01

## 2024-01-19 RX ADMIN — VASOPRESSIN: 20 INJECTION, SOLUTION INTRAVENOUS at 09:01

## 2024-01-19 RX ADMIN — MUPIROCIN 1 G: 20 OINTMENT TOPICAL at 09:01

## 2024-01-19 RX ADMIN — FAMOTIDINE 20 MG: 20 TABLET ORAL at 09:01

## 2024-01-19 RX ADMIN — GABAPENTIN 300 MG: 300 CAPSULE ORAL at 09:01

## 2024-01-19 RX ADMIN — ATORVASTATIN CALCIUM 40 MG: 40 TABLET, FILM COATED ORAL at 09:01

## 2024-01-19 NOTE — ASSESSMENT & PLAN NOTE
At this point monitor blood pressures.  Will put p.r.n..  I will repeat CT in the morning to evaluate any advancement.  Conservative measures.  Discussed with daughter who is power-of-.  No heroic measures no neurosurgery consultation.

## 2024-01-19 NOTE — ASSESSMENT & PLAN NOTE
Patient has hyponatremia which is uncontrolled,We will aim to correct the sodium by 4-6mEq in 24 hours. We will monitor sodium Every 12 hours. The hyponatremia is due to presumed dehydration versus SIADH versus COVID. We will obtain the following studies: Urine sodium, urine osmolality, serum osmolality. We will treat the hyponatremia with Fluid restriction of:  1.5 liter per day. The patient's sodium results have been reviewed and are listed below.  Recent Labs   Lab 01/18/24  1350   *

## 2024-01-19 NOTE — PROCEDURES
"Bhavani Galarza is a 89 y.o. female patient.    Temp: 97.6 °F (36.4 °C) (01/19/24 1415)  Pulse: 75 (01/19/24 1415)  Resp: 18 (01/19/24 1415)  BP: 133/68 (01/19/24 1415)  SpO2: (!) 94 % (01/19/24 1415)  Weight: 42.2 kg (93 lb 0.6 oz) (01/19/24 0500)  Height: 5' 1" (154.9 cm) (01/18/24 2048)    PICC  Date/Time: 1/19/2024 5:27 PM  Performed by: Mckenzie Song, MELA  Consent Done: Yes  Time out: Immediately prior to procedure a time out was called to verify the correct patient, procedure, equipment, support staff and site/side marked as required  Indications: med administration and vascular access  Anesthesia: local infiltration  Local anesthetic: lidocaine 1% without epinephrine  Anesthetic Total (mL): 3  Preparation: skin prepped with ChloraPrep  Skin prep agent dried: skin prep agent completely dried prior to procedure  Sterile barriers: all five maximum sterile barriers used - cap, mask, sterile gown, sterile gloves, and large sterile sheet  Hand hygiene: hand hygiene performed prior to central venous catheter insertion  Location details: right basilic  Catheter type: double lumen  Catheter size: 5 Fr  Catheter Length: 33cm    Ultrasound guidance: yes  Vessel Caliber: medium and patent, compressibility normal  Needle advanced into vessel with real time Ultrasound guidance.  Guidewire confirmed in vessel.  Sterile sheath used.  Number of attempts: 1  Post-procedure: blood return through all ports, sterile dressing applied and chlorhexidine patch    Assessment: placement verified by x-ray  Complications: none          Mckenzie Song RN  1/19/2024    "

## 2024-01-19 NOTE — H&P
[facility] Medicine  History & Physical    Patient Name: Bhavani Galarza  MRN: [id number]  Patient Class: IP- Inpatient  Admission Date: 1/18/2024  Attending Physician: Tomer Samaniego III, *   Primary Care Provider: Tomer Samaniego III, MD         Patient information was obtained from patient, relative(s), and ER records.     Subjective:     Principal Problem:SDH (subdural hematoma)    Chief Complaint:   Chief Complaint   Patient presents with    Fall     Sent from [facility] for unwitnessed fall with laceration to posterior head. PT on Plavix. Unknown LOC. Pt is also Covid +        HPI: 89-year-old white female with a recent MCA ischemic infarct from 3 weeks ago has a fall reported and evaluated in the ER with 3 subdural right-sided small bleeds.  Patient also presents with hyponatremia semi of 131.  Discussion with the family in my previous BERNARD for seen her early this Monday, she reported with improving strength, appetite.  She had a left lower quadrant anopsia from previous right-sided MCA infarct.  Doing better but we recommended her to continue PT at the nursing home.    I was contacted by Dr. Nixon from the ER which point he discussed me and his preliminary workup of the patient's bleeds.  No mentioned of the patient being COVID was made to me, nonetheless I see in the chart this evening.    Patient denies any particular pain.  She is able to get up and move to the commode with maximal assist.  She does have some communication issues and she does look to have a little bit of disorientation    Collateral information with the family agrees with regards to the patient.  The daughter in [facility] was contacted about the patient being COVID positive.  The daughter-in-law Liv noted today that the patient had fallen in a manner while she was waiting to be taken to the bathroom.    Upon my discussion with the ER doctor, he had a further discussion with  the patient and the family power-of- and daughter Ms. Sorto.  They did not want any aggressive measures.  They wanted to keep her in [place] without any neurosurgical consultation for management of the intracranial bleeds.  Please see Dr. Nixon is documentation    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        Past Surgical History:   Procedure Laterality Date    BACK SURGERY      COLONOSCOPY      X 2    EPIDURAL STEROID INJECTION INTO LUMBAR SPINE Right 10/20/2023    Procedure: INJECTION, STEROID, SPINE, LUMBAR, EPIDURAL (Right L5- S1 ILESI);  Surgeon: Amrit Song MD;  Location: [facility] OR;  Service: Pain Management;  Laterality: Right;    TONSILLECTOMY      TRANSFORAMINAL EPIDURAL INJECTION OF STEROID Right 09/22/2023    Procedure: INJECTION, STEROID, EPIDURAL, TRANSFORAMINAL APPROACH (Right TFESI L4 & L5);  Surgeon: Amrit Song MD;  Location: [facility] OR;  Service: Pain Management;  Laterality: Right;       Review of patient's allergies indicates:  No Known Allergies    Current Facility-Administered Medications on File Prior to Encounter   Medication    lactated ringers infusion     Current Outpatient Medications on File Prior to Encounter   Medication Sig    acetaminophen (TYLENOL) 325 MG tablet Take 2 tablets (650 mg total) by mouth every 6 (six) hours as needed.    albuterol-ipratropium (DUO-NEB) 2.5 mg-0.5 mg/3 mL nebulizer solution Take 3 mLs by nebulization every 6 (six) hours as needed for Wheezing. Rescue    atorvastatin (LIPITOR) 40 MG tablet Take 1 tablet (40 mg total) by mouth once daily.    famotidine (PEPCID) 20 MG tablet Take 1 tablet (20 mg total) by mouth once daily.    gabapentin (NEURONTIN) 300 MG capsule Take 300 mg by mouth 2 (two) times daily.    HYDROcodone-acetaminophen (NORCO) 7.5-325 mg per tablet Take 1 tablet by mouth every 6 (six) hours as needed for Pain.    polyethylene glycol (GLYCOLAX) 17 gram PwPk Take 17 g  by mouth once daily.    sertraline (ZOLOFT) 25 MG tablet Take 1 tablet (25 mg total) by mouth once daily.    [DISCONTINUED] aspirin (ECOTRIN) 81 MG EC tablet Take 81 mg by mouth every evening. Stopped 9/21/23    [DISCONTINUED] clopidogreL (PLAVIX) 75 mg tablet Take 1 tablet (75 mg total) by mouth once daily.     Family History       Problem Relation (Age of Onset)    Heart attack Father    No Known Problems Mother          Tobacco Use    Smoking status: Never    Smokeless tobacco: Never   Substance and Sexual Activity    Alcohol use: Not Currently    Drug use: Never    Sexual activity: Not Currently     Review of Systems   Constitutional:  Positive for activity change and fatigue.   HENT:  Positive for congestion, postnasal drip, rhinorrhea, sinus pressure, sneezing, sore throat and trouble swallowing.    Respiratory:  Positive for chest tightness. Negative for cough, shortness of breath, wheezing and stridor.    Gastrointestinal:  Positive for constipation.   Neurological:  Positive for dizziness, speech difficulty and weakness.   Psychiatric/Behavioral:  Positive for confusion.      Objective:     Vital Signs (Most Recent):  Temp: 97.5 °F (36.4 °C) (01/18/24 1959)  Pulse: 86 (01/18/24 1959)  Resp: 16 (01/18/24 1959)  BP: (!) 169/84 (01/18/24 1959)  SpO2: 95 % (01/18/24 1959) Vital Signs (24h Range):  Temp:  [97.5 °F (36.4 °C)-98.5 °F (36.9 °C)] 97.5 °F (36.4 °C)  Pulse:  [83-96] 86  Resp:  [16-20] 16  SpO2:  [92 %-96 %] 95 %  BP: (136-191)/() 169/84     Weight: 42.2 kg (93 lb)  Body mass index is 17.57 kg/m².     Physical Exam    On physical exam she has with a partner and friend of her Yazidi who has also [facility].  The patient is somnolent but arousable.  She is difficulty with attention.  Patient could not cooperate for cranial nerve exam but pupils are equal reactive to light and accommodation bilaterally.  Her speech is slowed and she is actively hallucinating and making  automatisms in the air.  She was directable to degree and denies pain.  I discussed the case with the nurse Vangie as well who was not aware of her COVID status.    She is regular rate and rhythm no rubs gallops or murmurs clear to auscultation bilaterally soft nontender nondistended abdomen no clubbing cyanosis or edema.  She has a mild faint bruise to the right knee that appears to be a couple days old.  She is trace edema of the ankles.    On further musculoskeletal exam she moves all 4 extremities and does not appear to show any signs of focal weakness.  Just generalized weakness          Significant Labs: All pertinent labs within the past 24 hours have been reviewed.    Significant Imaging: I have reviewed all pertinent imaging results/findings within the past 24 hours.  Assessment/Plan:     * SDH (subdural hematoma)  At this point monitor blood pressures.  Will put p.r.n..  I will repeat CT in the morning to evaluate any advancement.  Conservative measures.  Discussed with daughter who is power-of-.  No heroic measures no neurosurgery consultation.      COVID-19  Patient is identified as low risk  Initiate standard COVID protocols; COVID-19 testing ,Infection Control notification  and isolation- respiratory, contact and droplet per protocol    Diagnostics: CBC, CMP, Ferritin, CRP, and Portable CXR    Management: Inhaled bronchodilators as needed for shortness of breath.    Advance Care Planning Current advance care plan has been discussed with patient/family/POA and patient currently wishes DNR (Do Not Resuscitate).     Scalp laceration    Monitor clinically.    Lower extremity pain, anterior, unspecified laterality    Patient on gabapentin.  This is an L5 nerve radiculopathy    Hyponatremia  Patient has hyponatremia which is uncontrolled,We will aim to correct the sodium by 4-6mEq in 24 hours. We will monitor sodium Every 12 hours. The hyponatremia is due to presumed dehydration versus SIADH versus  COVID. We will obtain the following studies: Urine sodium, urine osmolality, serum osmolality. We will treat the hyponatremia with Fluid restriction of:  1.5 liter per day. The patient's sodium results have been reviewed and are listed below.  Recent Labs   Lab 01/18/24  1350   *         VTE Risk Mitigation (From admission, onward)           Ordered     IP VTE HIGH RISK PATIENT  Once         01/18/24 2056     Place sequential compression device  Until discontinued         01/18/24 2056                                    Tomer Samaniego III, MD  Department of Hospital Medicine  [facility]

## 2024-01-19 NOTE — ASSESSMENT & PLAN NOTE
Patient is identified as low risk  Initiate standard COVID protocols; COVID-19 testing ,Infection Control notification  and isolation- respiratory, contact and droplet per protocol    Diagnostics: CBC, CMP, Ferritin, CRP, and Portable CXR    Management: Inhaled bronchodilators as needed for shortness of breath.    Advance Care Planning  Current advance care plan has been discussed with patient/family/POA and patient currently wishes DNR (Do Not Resuscitate).

## 2024-01-19 NOTE — PROGRESS NOTES
[facility]  [address] [place] [address]  Phone: [phone]    (Hospital) Internal Medicine  Progress Note      PATIENT NAME: Bhavani Galarza  MRN: [id number]  TODAY'S DATE: 01/19/2024  ADMIT DATE: 1/18/2024    SUBJECTIVE     PRINCIPLE PROBLEM: SDH (subdural hematoma)    INTERVAL HISTORY:    1/19/2024  Patient underwent CT late this afternoon which shows more confluence of bleeding of the brain on the right hemisphere the patient clinically doing better today.  Her last dose Plavix was yesterday.  Patient was eating, she is ambulating with assistance.  She has not complaining of any empiric       Review of patient's allergies indicates:  No Known Allergies    ROS negative urine point systems except as mentioned    OBJECTIVE     VITAL SIGNS (Most Recent)  Temp: 97.6 °F (36.4 °C) (01/19/24 1415)  Pulse: 75 (01/19/24 1415)  Resp: 18 (01/19/24 1415)  BP: 133/68 (01/19/24 1415)  SpO2: (!) 94 % (01/19/24 1415)    VENTILATION STATUS  Resp: 18 (01/19/24 1415)  SpO2: (!) 94 % (01/19/24 1415)           I & O (Last 24H):  Intake/Output Summary (Last 24 hours) at 1/19/2024 1635  Last data filed at 1/19/2024 0800  Gross per 24 hour   Intake 240 ml   Output 900 ml   Net -660 ml       WEIGHTS  Wt Readings from Last 3 Encounters:   01/19/24 0500 42.2 kg (93 lb 0.6 oz)   01/18/24 1535 42.2 kg (93 lb)   01/18/24 1235 42.2 kg (93 lb)   12/26/23 1719 45.4 kg (100 lb)   12/26/23 1050 45.4 kg (100 lb)   10/18/23 1343 40.8 kg (90 lb)       Physical Exam    Patient is alert and oriented x3.  She is with her  coli.  Cranial nerves 2-12 appear to be basically intact.  She is previous left-sided neglect to the left lower quadrant.  She is regular rate and rhythm no rubs gallops to auscultation bilaterally soft nontender nondistended abdomen no clubbing cyanosis or edema.    SCHEDULED MEDS:   atorvastatin  40 mg Oral Daily    famotidine  20 mg Oral Daily    gabapentin  300 mg Oral BID    mupirocin   " Nasal BID    polyethylene glycol  17 g Oral Daily       CONTINUOUS INFUSIONS:   sodium chloride (23.4%) HYPERTONIC 4 mEq/mL 172 mEq in sterile water 500 mL (sodium chloride 2%) infusion 33 mL/hr at 01/19/24 0950    sodium chloride 0.9%         PRN MEDS:acetaminophen, albuterol-ipratropium, bisacodyL, sodium chloride 0.9%, sodium chloride 0.9%    LABS AND DIAGNOSTICS     CBC LAST 3 DAYS  Recent Labs   Lab 01/18/24  1350 01/19/24  0502   WBC 8.03 6.37   RBC 3.72* 3.41*   HGB 11.9* 10.7*   HCT 35.3* 31.6*   MCV 94.9* 92.7   MCH 32.0* 31.4*   MCHC 33.7 33.9   RDW 13.3 12.9    197   MPV 8.9 8.9       COAGULATION LAST 3 DAYS  Recent Labs   Lab 01/18/24  1350   INR 1.0       CHEMISTRY LAST 3 DAYS  Recent Labs   Lab 01/18/24  1350 01/19/24  0502 01/19/24  1148   * 129* 130*   K 4.1 3.9  --    CO2 26 26  --    BUN 14.0 12.0  --    CREATININE 0.57 0.58  --    CALCIUM 8.9 8.7  --    ALBUMIN 3.3* 3.2*  --    ALKPHOS 96 81  --    ALT 49 44  --    AST 44* 40*  --    BILITOT 0.4 0.7  --        CARDIAC PROFILE LAST 3 DAYS  Recent Labs   Lab 01/18/24  1349 01/19/24  0502   CPK  --  64   LDH  --  256*   TROPONINI 0.026  --        ENDOCRINE LAST 3 DAYS  Recent Labs   Lab 01/18/24  1350   TSH 0.669       LAST ARTERIAL BLOOD GAS  ABG  No results for input(s): "PH", "PO2", "PCO2", "HCO3", "BE" in the last 168 hours.    LAST 7 DAYS MICROBIOLOGY   Microbiology Results (last 7 days)       ** No results found for the last 168 hours. **            MOST RECENT IMAGING  CT Head Without Contrast  Narrative: EXAMINATION:  CT HEAD WITHOUT CONTRAST    CLINICAL HISTORY:  Subdural hemorrhage;, .    TECHNIQUE:  PATIENT RADIATION DOSE: DLP(mGycm) 988    As per PQRS measures, all CT scans at this facility used dose modulation, iterative reconstruction, and/or weight based dose adjustment when appropriate to reduce radiation dose to as low as reasonably achievable.    COMPARISON:  01/18/2024    FINDINGS:  Serial axial images were obtained " of the head without the administration of IV contrast. Both brain and bone parenchymal windows were obtained.  Additional coronal and sagittal reconstructions were obtained.  The right subdural hematoma is less lobulated in appearance since the prior exam without the epidural characteristics.  There is interim increase in density and size of the sub dural hematoma along the right cerebral convexity, compatible with acute component.  There is interim increase in size of the small parenchymal hemorrhage at the right frontal lobe since the prior exam now measuring up to 2.5 cm in greatest diameter.  Small parenchymal hemorrhages at the posterior right parietal lobe are similar in appearance.  Parenchymal defect is again evident at the right the temple parietal lobe compatible with old infarct/encephalomalacia.  The hematoma measures approximately 1.5 cm in greatest thickness.  There is now diffuse sulcal effacement along the right cerebral hemisphere with slight midline shift to the left estimated at 4-5 mm.  Intracranial atherosclerosis seen.  Scattered hypodensities are noted to the periventricular white matter.  Paranasal sinuses and mastoid air cells are relatively clear.  External auditory canals are grossly patent.  There is interim resolution of previous soft tissue emphysema at the posterior left scalp with overlying staples.  Impression: 1. Interim changes in configuration of previous right subdural hematoma with decreased lobulation and increased opacification of the right cerebral convexity measuring up to 1.5 cm in thickness.  There is overall increase in density compatible with the increase in acute hemorrhagic components.  There is interim development of diffuse mass effect/diffuse sulcal effacement along the right cerebral convexity and slight midline shift to the left estimated at 4-5 mm.  2. Interim increase in size of small parenchymal hemorrhage at the anterior right frontal lobe since the prior exam  measuring up to 2.5 cm  3. Small parenchymal hemorrhages at the posterior right parietal lobe are similar in size  4. Generalized cerebral and cerebellar atrophy  5. Intracranial atherosclerosis  6. Interim resolution of scalp emphysema/scalp hematoma overlying the left occipital bone with overlying staples  7. The referring physician was notified of the findings on 01/19/2024 at 16:15.    Electronically signed by: Phill Giles  Date:    01/19/2024  Time:    16:24  X-Ray Chest 1 View  Narrative: EXAMINATION:  XR CHEST 1 VIEW    CLINICAL HISTORY:  Covid +;, .    COMPARISON:  01/02/2024    FINDINGS:  An AP view or more reveals the heart to be borderline enlarged.  The trachea is midline.  Atherosclerosis is seen within the aorta.  There is interim improved aeration of the lower lungs bilaterally.  Degenerative changes and mild curvature are noted to the thoracic spine.  Bony structures are osteopenic.  Impression: 1. Improved aeration of the lower lungs bilaterally with minimal residual infiltrate and or atelectasis left lung base  2. Borderline cardiomegaly  3. Atherosclerosis  4. Thoracic spondylosis, scoliosis, and osteopenia    Electronically signed by: Phill Giles  Date:    01/19/2024  Time:    11:23      [facility]  Results for orders placed during the hospital encounter of 12/26/23    Echo    Interpretation Summary    Right Ventricle: Right ventricle was not well visualized due to poor acoustic window. Normal right ventricular cavity size. Systolic function is normal.    Left Atrium: Left atrium is mildly dilated.    Right Atrium: Right atrium is mildly dilated.    Aortic Valve: Moderately calcified cusps. There is moderate stenosis. Aortic valve peak velocity is 2.85 m/s. There is moderate to severe aortic regurgitation. AR PHTN 274 msec.    If clinically indicated a ALMAS can be helpful to further assess the severity of AV disease.    Left Ventricle: The left ventricle is normal in size. There is mild  concentric hypertrophy. There is normal systolic function with a visually estimated ejection fraction of 55 - 60%.    Mitral Valve: Moderately thickened leaflets. Moderately calcified leaflets. There is mild stenosis. The mean pressure gradient across the mitral valve is 6 mmHg at a heart rate of  bpm. There is mild to moderate regurgitation.    Tricuspid Valve: There is no stenosis. There is mild regurgitation.    Pulmonic Valve: There is no stenosis.    Pleural effusion is present which can be better assessed by dedicated chest imaging.      CURRENT/PREVIOUS VISIT EKG  Results for orders placed or performed during the hospital encounter of 12/26/23   EKG 12-lead    Collection Time: 12/27/23 12:10 PM    Narrative    Test Reason : I69.952,    Vent. Rate : 076 BPM     Atrial Rate : 076 BPM     P-R Int : 164 ms          QRS Dur : 108 ms      QT Int : 406 ms       P-R-T Axes : 057 -74 084 degrees     QTc Int : 456 ms    Normal sinus rhythm  Left axis deviation  IVCD in LBBB morphology  Non-specific ST-T wave abnormalities  Abnormal ECG  No previous ECGs available  Confirmed by Ronald Weldon MD ([id number]) on 12/27/2023 3:30:29 PM    Referred By: BABS   SELF           Confirmed By:Ronald Weldon MD       ASSESSMENT/PLAN:     Active Hospital Problems    Diagnosis    *SDH (subdural hematoma)    Scalp laceration    COVID-19     Will get some markers in the morning.  Patient vaccination status unclear.  Will check with my lab in the morning office.  Will get COVID markers in the morning.  Patient was not requiring oxygen at this point.  To low risk because of age.      Lower extremity pain, anterior, unspecified laterality    Hyponatremia       ASSESSMENT & PLAN:   Will continue to monitor closely.  Patient spent 1 subdural hematoma but I think is due to Plavix being on board.  Will continue to monitor.  Conservative follow up.  Will discuss with family.    Patient with no signs of deterioration COVID-19 will follow up  clinically.  No indication for remdesivir.    Continue current meds for her lower extremities.      Continue hot salt monitor sodium levels.      RECOMMENDATIONS:  DVT prophylaxis contraindicated due to bleed.  GI prophylaxis pantoprazole        Tomer Samaniego III, MD  Department of Hospital Medicine ([facility])   Date of Service: 01/19/2024  4:35 PM

## 2024-01-19 NOTE — HPI
89-year-old white female with a recent MCA ischemic infarct from 3 weeks ago has a fall reported and evaluated in the ER with 3 subdural right-sided small bleeds.  Patient also presents with hyponatremia semi of 131.  Discussion with the family in my previous BERNARD for seen her early this Monday, she reported with improving strength, appetite.  She had a left lower quadrant anopsia from previous right-sided MCA infarct.  Doing better but we recommended her to continue PT at the nursing home.    I was contacted by Dr. Nixon from the ER which point he discussed me and his preliminary workup of the patient's bleeds.  No mentioned of the patient being COVID was made to me, nonetheless I see in the chart this evening.    Patient denies any particular pain.  She is able to get up and move to the commode with maximal assist.  She does have some communication issues and she does look to have a little bit of disorientation    Collateral information with the family agrees with regards to the patient.  The daughter in [place] was contacted about the patient being COVID positive.  The daughter-in-law Liv noted today that the patient had fallen in a manner while she was waiting to be taken to the bathroom.    Upon my discussion with the ER doctor, he had a further discussion with the patient and the family power-of- and daughter Ms. Sorto.  They did not want any aggressive measures.  They wanted to keep her in [place] without any neurosurgical consultation for management of the intracranial bleeds.  Please see Dr. Nixon is documentation

## 2024-01-19 NOTE — PT/OT/SLP EVAL
"Physical Therapy Evaluation    Patient Name:  Bhavani Galarza   MRN:  [id number]    Recommendations:     Discharge Recommendations:to be determined once activity limitations are lifted    Discharge Equipment Recommendations:  (to be determined once activity limitations are lifted)   Barriers to discharge: Decreased caregiver support    Assessment:     Bhavani Galarza is a 89 y.o. female admitted with a medical diagnosis of SDH (subdural hematoma).  She presents with the following impairments/functional limitations: other (comment) (activity limitations per MD/Nsg to ROM in bed today only) .     Chief Complaint:        Chief Complaint   Patient presents with    Fall       Sent from [facility] for unwitnessed fall with laceration to posterior head. PT on Plavix. Unknown LOC. Pt is also Covid +         From HPI: "89-year-old white female with a recent MCA ischemic infarct from 3 weeks ago has a fall reported and evaluated in the ER with 3 subdural right-sided small bleeds.  Patient also presents with hyponatremia semi of 131...She had a left lower quadrant anopsia from previous right-sided MCA infarct."    Rehab Prognosis:  to be determined ; patient would benefit from acute skilled PT services to address these deficits and reach maximum level of function.    Recent Surgery: * No surgery found *      Plan:     During this hospitalization, patient to be seen 5 x/week to address the identified rehab impairments via therapeutic exercises and progress toward the following goals:    Plan of Care Expires:       Subjective     Chief Complaint: Pt unaware of her limitations, impulsive, trying to get up  Patient/Family Comments/goals: to be determined  Pain/Comfort:       Patients cultural, spiritual, Yarsanism conflicts given the current situation:      Living Environment:  Pt came to hospital from [facility] where she was on SNF post CVA.  Prior to that she was living at home.  Prior to admission, patients level of " function was ambulator.  Equipment used at home:  (pt was at [facility]).  DME owned (not currently used): none.  Upon discharge, patient will have assistance from family/[facility] staff.    Objective:     Communicated with pt prior to session.  Patient found HOB elevated with    upon PT entry to room.    General Precautions: Standard,    Orthopedic Precautions:    Braces:    Respiratory Status: Room air    Exams:  RLE ROM: WFL  LLE ROM: WFL    Functional Mobility:  Bed Mobility:     Scooting: dependence      AM-PAC 6 CLICK MOBILITY  Total Score:7       Treatment & Education:  PT instructed pt in ROM exercises BLEs including heel slides, quad sets, SLR, hip abd add, ankle pumps, and glutes sets [id number].  Will reassess and consult MD and nsg on Monday.    Patient left HOB elevated with all lines intact and call button in reach.    GOALS:   Multidisciplinary Problems       Physical Therapy Goals          Problem: Physical Therapy    Goal Priority Disciplines Outcome Goal Variances Interventions   Physical Therapy Goal     PT, PT/OT Ongoing, Progressing     Description: 1.  Pt to be instructed in safe, appropriate exercises in bed to maintain BLE AROM and strength while on current activity limitations today 1-19-24.  2.  Pt to be reassessed and MD/Nurse to be consulted prior to further PT on Monday 1-22-24 in regards to activity limitations and medical status.                       History:     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        Past Surgical History:   Procedure Laterality Date    BACK SURGERY      COLONOSCOPY      X 2    EPIDURAL STEROID INJECTION INTO LUMBAR SPINE Right 10/20/2023    Procedure: INJECTION, STEROID, SPINE, LUMBAR, EPIDURAL (Right L5- S1 ILESI);  Surgeon: Amrit Song MD;  Location: [facility];  Service: Pain Management;  Laterality: Right;    TONSILLECTOMY      TRANSFORAMINAL EPIDURAL INJECTION OF STEROID Right 09/22/2023     Procedure: INJECTION, STEROID, EPIDURAL, TRANSFORAMINAL APPROACH (Right TFESI L4 & L5);  Surgeon: Amrit Song MD;  Location: [facility];  Service: Pain Management;  Laterality: Right;       Time Tracking:     PT Received On: 01/19/24  PT Start Time: 0845     PT Stop Time: 0900  PT Total Time (min): 15 min     Billable Minutes: Evaluation 15      01/19/2024

## 2024-01-19 NOTE — PT/OT/SLP PROGRESS
Speech Language Pathology      Bhavani Galarza  MRN: [id number]     orders received and attempted this date. Chart review completed and spoke with Nsg who cleared Pt for assessment and reported that Pt took medications w/o swallow difficulties. SLP unable to arouse Pt for eval. Pt sleeping soundly. SLP will re-attempt assessment next session as able.     Marylu Calhoun MS, CCC-SLP  01/19/2024

## 2024-01-19 NOTE — SUBJECTIVE & OBJECTIVE
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        Past Surgical History:   Procedure Laterality Date    BACK SURGERY      COLONOSCOPY      X 2    EPIDURAL STEROID INJECTION INTO LUMBAR SPINE Right 10/20/2023    Procedure: INJECTION, STEROID, SPINE, LUMBAR, EPIDURAL (Right L5- S1 ILESI);  Surgeon: Amrit Song MD;  Location: [facility] OR;  Service: Pain Management;  Laterality: Right;    TONSILLECTOMY      TRANSFORAMINAL EPIDURAL INJECTION OF STEROID Right 09/22/2023    Procedure: INJECTION, STEROID, EPIDURAL, TRANSFORAMINAL APPROACH (Right TFESI L4 & L5);  Surgeon: Amrit Song MD;  Location: [facility] OR;  Service: Pain Management;  Laterality: Right;       Review of patient's allergies indicates:  No Known Allergies    Current Facility-Administered Medications on File Prior to Encounter   Medication    lactated ringers infusion     Current Outpatient Medications on File Prior to Encounter   Medication Sig    acetaminophen (TYLENOL) 325 MG tablet Take 2 tablets (650 mg total) by mouth every 6 (six) hours as needed.    albuterol-ipratropium (DUO-NEB) 2.5 mg-0.5 mg/3 mL nebulizer solution Take 3 mLs by nebulization every 6 (six) hours as needed for Wheezing. Rescue    atorvastatin (LIPITOR) 40 MG tablet Take 1 tablet (40 mg total) by mouth once daily.    famotidine (PEPCID) 20 MG tablet Take 1 tablet (20 mg total) by mouth once daily.    gabapentin (NEURONTIN) 300 MG capsule Take 300 mg by mouth 2 (two) times daily.    HYDROcodone-acetaminophen (NORCO) 7.5-325 mg per tablet Take 1 tablet by mouth every 6 (six) hours as needed for Pain.    polyethylene glycol (GLYCOLAX) 17 gram PwPk Take 17 g by mouth once daily.    sertraline (ZOLOFT) 25 MG tablet Take 1 tablet (25 mg total) by mouth once daily.    [DISCONTINUED] aspirin (ECOTRIN) 81 MG EC tablet Take 81 mg by mouth every evening. Stopped 9/21/23    [DISCONTINUED] clopidogreL (PLAVIX) 75 mg tablet Take 1 tablet  (75 mg total) by mouth once daily.     Family History       Problem Relation (Age of Onset)    Heart attack Father    No Known Problems Mother          Tobacco Use    Smoking status: Never    Smokeless tobacco: Never   Substance and Sexual Activity    Alcohol use: Not Currently    Drug use: Never    Sexual activity: Not Currently     Review of Systems   Constitutional:  Positive for activity change and fatigue.   HENT:  Positive for congestion, postnasal drip, rhinorrhea, sinus pressure, sneezing, sore throat and trouble swallowing.    Respiratory:  Positive for chest tightness. Negative for cough, shortness of breath, wheezing and stridor.    Gastrointestinal:  Positive for constipation.   Neurological:  Positive for dizziness, speech difficulty and weakness.   Psychiatric/Behavioral:  Positive for confusion.      Objective:     Vital Signs (Most Recent):  Temp: 97.5 °F (36.4 °C) (01/18/24 1959)  Pulse: 86 (01/18/24 1959)  Resp: 16 (01/18/24 1959)  BP: (!) 169/84 (01/18/24 1959)  SpO2: 95 % (01/18/24 1959) Vital Signs (24h Range):  Temp:  [97.5 °F (36.4 °C)-98.5 °F (36.9 °C)] 97.5 °F (36.4 °C)  Pulse:  [83-96] 86  Resp:  [16-20] 16  SpO2:  [92 %-96 %] 95 %  BP: (136-191)/() 169/84     Weight: 42.2 kg (93 lb)  Body mass index is 17.57 kg/m².     Physical Exam    On physical exam she has with a partner and friend of her Christian who has also a CNA at the [facility] home Ms. Beltran.  The patient is somnolent but arousable.  She is difficulty with attention.  Patient could not cooperate for cranial nerve exam but pupils are equal reactive to light and accommodation bilaterally.  Her speech is slowed and she is actively hallucinating and making automatisms in the air.  She was directable to degree and denies pain.  I discussed the case with the nurse Vangie as well who was not aware of her COVID status.    She is regular rate and rhythm no rubs gallops or murmurs clear to auscultation bilaterally soft nontender  nondistended abdomen no clubbing cyanosis or edema.  She has a mild faint bruise to the right knee that appears to be a couple days old.  She is trace edema of the ankles.    On further musculoskeletal exam she moves all 4 extremities and does not appear to show any signs of focal weakness.  Just generalized weakness          Significant Labs: All pertinent labs within the past 24 hours have been reviewed.    Significant Imaging: I have reviewed all pertinent imaging results/findings within the past 24 hours.

## 2024-01-20 LAB
ALBUMIN SERPL-MCNC: 2.9 G/DL (ref 3.4–4.8)
ALBUMIN/GLOB SERPL: 1.1 RATIO (ref 1.1–2)
ALP SERPL-CCNC: 70 UNIT/L (ref 40–150)
ALT SERPL-CCNC: 40 UNIT/L (ref 0–55)
ANION GAP SERPL CALC-SCNC: 8 MEQ/L
ANION GAP SERPL CALC-SCNC: 9 MEQ/L
AST SERPL-CCNC: 39 UNIT/L (ref 5–34)
BASOPHILS # BLD AUTO: 0.02 X10(3)/MCL
BASOPHILS NFR BLD AUTO: 0.4 %
BILIRUB SERPL-MCNC: 0.6 MG/DL
BUN SERPL-MCNC: 14 MG/DL (ref 9.8–20.1)
BUN SERPL-MCNC: 14 MG/DL (ref 9.8–20.1)
BUN SERPL-MCNC: 15 MG/DL (ref 9.8–20.1)
CALCIUM SERPL-MCNC: 8.4 MG/DL (ref 8.4–10.2)
CALCIUM SERPL-MCNC: 8.5 MG/DL (ref 8.4–10.2)
CALCIUM SERPL-MCNC: 8.5 MG/DL (ref 8.4–10.2)
CHLORIDE SERPL-SCNC: 102 MMOL/L (ref 98–111)
CHLORIDE SERPL-SCNC: 103 MMOL/L (ref 98–111)
CHLORIDE SERPL-SCNC: 103 MMOL/L (ref 98–111)
CO2 SERPL-SCNC: 21 MMOL/L (ref 23–31)
CO2 SERPL-SCNC: 22 MMOL/L (ref 23–31)
CO2 SERPL-SCNC: 22 MMOL/L (ref 23–31)
CREAT SERPL-MCNC: 0.52 MG/DL (ref 0.55–1.02)
CREAT SERPL-MCNC: 0.55 MG/DL (ref 0.55–1.02)
CREAT SERPL-MCNC: 0.56 MG/DL (ref 0.55–1.02)
CREAT/UREA NIT SERPL: 25
CREAT/UREA NIT SERPL: 27
EOSINOPHIL # BLD AUTO: 0.03 X10(3)/MCL (ref 0–0.9)
EOSINOPHIL NFR BLD AUTO: 0.6 %
ERYTHROCYTE [DISTWIDTH] IN BLOOD BY AUTOMATED COUNT: 13.4 % (ref 11.5–17)
GFR SERPLBLD CREATININE-BSD FMLA CKD-EPI: >60 MLS/MIN/1.73/M2
GLOBULIN SER-MCNC: 2.7 GM/DL (ref 2.4–3.5)
GLUCOSE SERPL-MCNC: 150 MG/DL (ref 75–121)
GLUCOSE SERPL-MCNC: 90 MG/DL (ref 75–121)
GLUCOSE SERPL-MCNC: 91 MG/DL (ref 75–121)
HCT VFR BLD AUTO: 30.6 % (ref 37–47)
HGB BLD-MCNC: 10.3 G/DL (ref 12–16)
IMM GRANULOCYTES # BLD AUTO: 0.02 X10(3)/MCL (ref 0–0.04)
IMM GRANULOCYTES NFR BLD AUTO: 0.4 %
LYMPHOCYTES # BLD AUTO: 0.58 X10(3)/MCL (ref 0.6–4.6)
LYMPHOCYTES NFR BLD AUTO: 10.7 %
MCH RBC QN AUTO: 31.9 PG (ref 27–31)
MCHC RBC AUTO-ENTMCNC: 33.7 G/DL (ref 33–36)
MCV RBC AUTO: 94.7 FL (ref 80–94)
MONOCYTES # BLD AUTO: 0.57 X10(3)/MCL (ref 0.1–1.3)
MONOCYTES NFR BLD AUTO: 10.5 %
NEUTROPHILS # BLD AUTO: 4.21 X10(3)/MCL (ref 2.1–9.2)
NEUTROPHILS NFR BLD AUTO: 77.4 %
PLATELET # BLD AUTO: 184 X10(3)/MCL (ref 130–400)
PMV BLD AUTO: 9.2 FL (ref 7.4–10.4)
POTASSIUM SERPL-SCNC: 3 MMOL/L (ref 3.5–5.1)
POTASSIUM SERPL-SCNC: 3.3 MMOL/L (ref 3.5–5.1)
POTASSIUM SERPL-SCNC: 3.5 MMOL/L (ref 3.5–5.1)
PROT SERPL-MCNC: 5.6 GM/DL (ref 5.8–7.6)
RBC # BLD AUTO: 3.23 X10(6)/MCL (ref 4.2–5.4)
SODIUM SERPL-SCNC: 132 MMOL/L (ref 132–146)
SODIUM SERPL-SCNC: 133 MMOL/L (ref 132–146)
SODIUM SERPL-SCNC: 134 MMOL/L (ref 132–146)
SODIUM SERPL-SCNC: 134 MMOL/L (ref 132–146)
WBC # SPEC AUTO: 5.43 X10(3)/MCL (ref 4.5–11.5)

## 2024-01-20 PROCEDURE — 85025 COMPLETE CBC W/AUTO DIFF WBC: CPT | Performed by: INTERNAL MEDICINE

## 2024-01-20 PROCEDURE — 11000001 HC ACUTE MED/SURG PRIVATE ROOM

## 2024-01-20 PROCEDURE — 63600175 PHARM REV CODE 636 W HCPCS: Performed by: INTERNAL MEDICINE

## 2024-01-20 PROCEDURE — 21400001 HC TELEMETRY ROOM

## 2024-01-20 PROCEDURE — A4217 STERILE WATER/SALINE, 500 ML: HCPCS | Performed by: INTERNAL MEDICINE

## 2024-01-20 PROCEDURE — 84295 ASSAY OF SERUM SODIUM: CPT | Performed by: INTERNAL MEDICINE

## 2024-01-20 PROCEDURE — 27000207 HC ISOLATION

## 2024-01-20 PROCEDURE — 99900035 HC TECH TIME PER 15 MIN (STAT)

## 2024-01-20 PROCEDURE — 25000003 PHARM REV CODE 250: Performed by: FAMILY MEDICINE

## 2024-01-20 PROCEDURE — A4216 STERILE WATER/SALINE, 10 ML: HCPCS | Performed by: INTERNAL MEDICINE

## 2024-01-20 PROCEDURE — 80053 COMPREHEN METABOLIC PANEL: CPT | Performed by: INTERNAL MEDICINE

## 2024-01-20 PROCEDURE — 25000003 PHARM REV CODE 250: Performed by: INTERNAL MEDICINE

## 2024-01-20 PROCEDURE — 94761 N-INVAS EAR/PLS OXIMETRY MLT: CPT

## 2024-01-20 RX ORDER — SODIUM CHLORIDE 9 MG/ML
INJECTION, SOLUTION INTRAVENOUS CONTINUOUS
Status: DISCONTINUED | OUTPATIENT
Start: 2024-01-20 | End: 2024-01-21

## 2024-01-20 RX ORDER — HYDRALAZINE HYDROCHLORIDE 20 MG/ML
10 INJECTION INTRAMUSCULAR; INTRAVENOUS EVERY 6 HOURS PRN
Status: DISCONTINUED | OUTPATIENT
Start: 2024-01-20 | End: 2024-01-30 | Stop reason: HOSPADM

## 2024-01-20 RX ORDER — AMLODIPINE BESYLATE 5 MG/1
5 TABLET ORAL DAILY
Status: DISCONTINUED | OUTPATIENT
Start: 2024-01-20 | End: 2024-01-30 | Stop reason: HOSPADM

## 2024-01-20 RX ADMIN — VASOPRESSIN: 20 INJECTION, SOLUTION INTRAVENOUS at 12:01

## 2024-01-20 RX ADMIN — SODIUM CHLORIDE: 9 INJECTION, SOLUTION INTRAVENOUS at 12:01

## 2024-01-20 RX ADMIN — MUPIROCIN 1 G: 20 OINTMENT TOPICAL at 09:01

## 2024-01-20 RX ADMIN — SODIUM CHLORIDE, PRESERVATIVE FREE 10 ML: 5 INJECTION INTRAVENOUS at 12:01

## 2024-01-20 RX ADMIN — ATORVASTATIN CALCIUM 40 MG: 40 TABLET, FILM COATED ORAL at 09:01

## 2024-01-20 RX ADMIN — MUPIROCIN 1 G: 20 OINTMENT TOPICAL at 08:01

## 2024-01-20 RX ADMIN — GABAPENTIN 300 MG: 300 CAPSULE ORAL at 08:01

## 2024-01-20 RX ADMIN — AMLODIPINE BESYLATE 5 MG: 5 TABLET ORAL at 12:01

## 2024-01-20 RX ADMIN — POLYETHYLENE GLYCOL 3350 17 G: 17 POWDER, FOR SOLUTION ORAL at 09:01

## 2024-01-20 RX ADMIN — PANTOPRAZOLE SODIUM 40 MG: 40 TABLET, DELAYED RELEASE ORAL at 06:01

## 2024-01-20 RX ADMIN — GABAPENTIN 300 MG: 300 CAPSULE ORAL at 09:01

## 2024-01-20 RX ADMIN — ACETAMINOPHEN 650 MG: 325 TABLET, FILM COATED ORAL at 02:01

## 2024-01-20 RX ADMIN — SODIUM CHLORIDE, PRESERVATIVE FREE 10 ML: 5 INJECTION INTRAVENOUS at 06:01

## 2024-01-20 NOTE — PROGRESS NOTES
[facility] - [facility] Medicine  Progress Note    Patient Name: Bhavani Galarza  MRN: [id number]  Patient Class: IP- Inpatient   Admission Date: 1/18/2024  Length of Stay: 2 days  Attending Physician: Tomer Samaniego III, *  Primary Care Provider: Tomer Samaniego III, MD        Subjective:     Principal Problem:SDH (subdural hematoma)        HPI:  89-year-old white female with a recent MCA ischemic infarct from 3 weeks ago has a fall reported and evaluated in the ER with 3 subdural right-sided small bleeds.  Patient also presents with hyponatremia semi of 131.  Discussion with the family in my previous BERNARD for seen her early this Monday, she reported with improving strength, appetite.  She had a left lower quadrant anopsia from previous right-sided MCA infarct.  Doing better but we recommended her to continue PT at the nursing home.    I was contacted by Dr. Nixon from the ER which point he discussed me and his preliminary workup of the patient's bleeds.  No mentioned of the patient being COVID was made to me, nonetheless I see in the chart this evening.    Patient denies any particular pain.  She is able to get up and move to the commode with maximal assist.  She does have some communication issues and she does look to have a little bit of disorientation    Collateral information with the family agrees with regards to the patient.  The daughter in [facility] was contacted about the patient being COVID positive.  The daughter-in-law Liv noted today that the patient had fallen in a manner while she was waiting to be taken to the bathroom.    Upon my discussion with the ER doctor, he had a further discussion with the patient and the family power-of- and daughter Ms. Sorto.  They did not want any aggressive measures.  They wanted to keep her in [place] without any neurosurgical consultation for management of the intracranial bleeds.  Please see Dr. Nixon is  documentation    Overview/Hospital Course:  Doing pretty well today.  She is awake and alert conversing with me stating she has had a bit of constipation still has some headache.  In his thirsty at this time.    Blood pressure has been elevated.  She says she has no changes in her vision she is awake and alert she can tell me the day of the weekend she recognized me.  Shortness a breath no chest pains at this time no nausea vomiting    Interval History: 1 day    Review of Systems   Constitutional:  Positive for fatigue.   HENT: Negative.     Eyes: Negative.    Respiratory: Negative.     Cardiovascular: Negative.    Gastrointestinal:  Positive for constipation.   Endocrine: Negative.    Genitourinary: Negative.    Musculoskeletal:  Positive for neck pain.   Skin: Negative.    Neurological:  Positive for headaches. Negative for dizziness, speech difficulty, weakness, light-headedness and numbness.   Hematological: Negative.    Psychiatric/Behavioral: Negative.       Objective:     Vital Signs (Most Recent):  Temp: 98 °F (36.7 °C) (01/20/24 0943)  Pulse: 79 (01/20/24 0943)  Resp: 18 (01/20/24 0414)  BP: (!) 183/87 (01/20/24 0943)  SpO2: 95 % (01/20/24 0943) Vital Signs (24h Range):  Temp:  [97.6 °F (36.4 °C)-98.5 °F (36.9 °C)] 98 °F (36.7 °C)  Pulse:  [68-84] 79  Resp:  [16-18] 18  SpO2:  [92 %-95 %] 95 %  BP: (129-183)/(68-87) 183/87     Weight: 41.9 kg (92 lb 6 oz)  Body mass index is 17.45 kg/m².    Intake/Output Summary (Last 24 hours) at 1/20/2024 1125  Last data filed at 1/19/2024 1700  Gross per 24 hour   Intake 180 ml   Output 200 ml   Net -20 ml         Physical Exam  Constitutional:       General: She is not in acute distress.     Appearance: Normal appearance.   HENT:      Head: Normocephalic.      Right Ear: Tympanic membrane normal.      Left Ear: Tympanic membrane normal.      Nose: Nose normal.      Mouth/Throat:      Mouth: Mucous membranes are moist.      Pharynx: Oropharynx is clear.   Eyes:       Extraocular Movements: Extraocular movements intact.      Pupils: Pupils are equal, round, and reactive to light.   Cardiovascular:      Rate and Rhythm: Normal rate and regular rhythm.      Pulses: Normal pulses.      Heart sounds: Normal heart sounds. No murmur heard.  Pulmonary:      Effort: Pulmonary effort is normal.      Breath sounds: Normal breath sounds.   Abdominal:      General: Abdomen is flat. There is no distension.      Palpations: Abdomen is soft.      Tenderness: There is no abdominal tenderness.   Musculoskeletal:         General: Normal range of motion.      Cervical back: Normal range of motion.   Skin:     General: Skin is warm.      Capillary Refill: Capillary refill takes less than 2 seconds.   Neurological:      General: No focal deficit present.      Mental Status: She is alert and oriented to person, place, and time. Mental status is at baseline.      Sensory: No sensory deficit.      Motor: No weakness.   Psychiatric:         Mood and Affect: Mood normal.         Behavior: Behavior normal.         Thought Content: Thought content normal.         Judgment: Judgment normal.             Significant Labs: All pertinent labs within the past 24 hours have been reviewed.  CBC:   Recent Labs   Lab 01/18/24  1350 01/19/24  0502 01/20/24  0639   WBC 8.03 6.37 5.43   HGB 11.9* 10.7* 10.3*   HCT 35.3* 31.6* 30.6*    197 184     CMP:   Recent Labs   Lab 01/18/24  1350 01/19/24  0502 01/19/24  1148 01/19/24  1736 01/20/24  0014 01/20/24  0639 01/20/24  0809   * 129*   < > 132 134 134 133   K 4.1 3.9  --  3.6  --  3.5 3.3*   CO2 26 26  --  23  --  22* 22*   BUN 14.0 12.0  --  13.0  --  15.0 14.0   CREATININE 0.57 0.58  --  0.54*  --  0.55 0.52*   CALCIUM 8.9 8.7  --  8.5  --  8.5 8.5   ALBUMIN 3.3* 3.2*  --   --   --  2.9*  --    BILITOT 0.4 0.7  --   --   --  0.6  --    ALKPHOS 96 81  --   --   --  70  --    AST 44* 40*  --   --   --  39*  --    ALT 49 44  --   --   --  40  --     < > =  values in this interval not displayed.       Significant Imaging: I have reviewed all pertinent imaging results/findings within the past 24 hours.  CT: I have reviewed all pertinent results/findings within the past 24 hours and my personal findings are:  Subdural bleed    Assessment/Plan:      * SDH (subdural hematoma)  At this point monitor blood pressures.  Will put p.r.n..  I will repeat CT in the morning to evaluate any advancement.  Conservative measures.  Discussed with daughter who is power-of-.  No heroic measures no neurosurgery consultation.      COVID-19  Patient is identified as low risk  Initiate standard COVID protocols; COVID-19 testing ,Infection Control notification  and isolation- respiratory, contact and droplet per protocol    Diagnostics: CBC, CMP, Ferritin, CRP, and Portable CXR    Management: Inhaled bronchodilators as needed for shortness of breath.    Advance Care Planning Current advance care plan has been discussed with patient/family/POA and patient currently wishes DNR (Do Not Resuscitate).     Scalp laceration    Monitor clinically.  Clean and dress daily    Lower extremity pain, anterior, unspecified laterality  Ical therapy for strengthening.  Patient on gabapentin.  This is an L5 nerve radiculopathy  We will consult    Hyponatremia  Patient has hyponatremia which is controlled,We will aim to correct the sodium by 4-6mEq in 24 hours. We will monitor sodium Every 12 hours. The hyponatremia is due to presumed dehydration versus SIADH versus COVID. We will obtain the following studies: Urine sodium, urine osmolality, serum osmolality. We will treat the hyponatremia with Fluid restriction of:  1.5 liter per day. The patient's sodium results have been reviewed and are listed below.  Recent Labs   Lab 01/20/24  0809        A BMP daily, DC hypertonic saline use just regular normal saline at this      VTE Risk Mitigation (From admission, onward)           Ordered     IP VTE HIGH  RISK PATIENT  Once         01/18/24 2056     Place sequential compression device  Until discontinued         01/18/24 2056                    Discharge Planning   SANDY:      Code Status: DNR   Is the patient medically ready for discharge?:     Reason for patient still in hospital (select all that apply): Patient trending condition                     Williams Yo MD  Department of Hospital Medicine   [facility] - Medical Surgical Unit

## 2024-01-20 NOTE — ASSESSMENT & PLAN NOTE
Ical therapy for strengthening.  Patient on gabapentin.  This is an L5 nerve radiculopathy  We will consult

## 2024-01-20 NOTE — HOSPITAL COURSE
Patient 89-year-old white female with the extensive neurovascular course of a right MCA CVA proximally month ago, placed on appropriate anticoagulation and statin therapy, then suffered a fall with 3 right-sided temporal parietal and frontal bleed that coalesced as a subdural hematoma.  Patient was admitted to the hospital here on a proximally January 18, 2024 was diagnosed incidentally with COVID from the nursing home.    From a neurologic perspective, metabolic encephalopathy secondary to neurovascular injury as well as hyponatremia.  I am recommending no anticoagulation at this point but continue statin.      Will also follow up on her sodium levels.  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  She is on salt tablets and a started on some hydrocortisone 10/5 in the a.m./p.m..  Will follow those and continue those meds.      From a pulmonary standpoint I am going to add on some budesonide as well.  This is for post COVID and her complain of shortness a breath.  Also consider adding some nitroglycerin for any cardiac shortness of breath.      She had a laceration to the occiput that will require staple remover at the end of this week.      From a cardiac standpoint she did have some elevated troponins about 3.336.  Some mild likely type 2 troponin leak.  Patient was somnolent that day and I think she was not perfusing her brain but she woke up and she is doing much better.  No intervention at this point because of age and family discussions.  Continue statin only.  No anticoagulation due to contraindication relative to the subdural.      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  Will continue her hydrocortisone at the aforementioned dosing.  Will follow up on the sodium levels as an outpatient.    From a physical rehab standpoint the patient would like to rehab.  Will try our best to get  her to the sniff unit.  But we had a lengthy discussion with the daughter Noreen who is power-of- and will transition her to palliative care and hospice has already been consulted for this reason to make the transition smooth.  Family is aware and agrees to this.  Patient was DNI DNR.    From a GI standpoint patient with occasional constipation will continue current p.r.n. meds.    I will follow back up with her my office in about a 2 weeks    I will follow up with a CMP towards the end of the week.  And I will get my NP Eve to remove the staples at the end of the week.  I was not comfortable removing just yet since her 89-year-old scalp is somewhat thin.  She respiratory had a lot of the pill and I did not want this to shear open..    Time spent with family 30 minutes.  All questions have been answered.

## 2024-01-20 NOTE — PLAN OF CARE
Problem: Adult Inpatient Plan of Care  Goal: Plan of Care Review  Outcome: Ongoing, Progressing  Goal: Patient-Specific Goal (Individualized)  Outcome: Ongoing, Progressing  Goal: Absence of Hospital-Acquired Illness or Injury  Outcome: Ongoing, Progressing  Goal: Optimal Comfort and Wellbeing  Outcome: Ongoing, Progressing  Goal: Readiness for Transition of Care  Outcome: Ongoing, Progressing     Problem: Skin Injury Risk Increased  Goal: Skin Health and Integrity  Outcome: Ongoing, Progressing     Problem: Fall Injury Risk  Goal: Absence of Fall and Fall-Related Injury  Outcome: Ongoing, Progressing     Problem: Pain Acute  Goal: Acceptable Pain Control and Functional Ability  Outcome: Ongoing, Progressing     Problem: Adjustment to Illness (Stroke, Hemorrhagic)  Goal: Optimal Coping  Outcome: Ongoing, Progressing     Problem: Bowel Elimination Impaired (Stroke, Hemorrhagic)  Goal: Effective Bowel Elimination  Outcome: Ongoing, Progressing     Problem: Cerebral Tissue Perfusion (Stroke, Hemorrhagic)  Goal: Optimal Cerebral Tissue Perfusion  Outcome: Ongoing, Progressing     Problem: Cognitive Impairment (Stroke, Hemorrhagic)  Goal: Optimal Cognitive Function  Outcome: Ongoing, Progressing     Problem: Communication Impairment (Stroke, Hemorrhagic)  Goal: Effective Communication Skills  Outcome: Ongoing, Progressing     Problem: Functional Ability Impaired (Stroke, Hemorrhagic)  Goal: Optimal Functional Ability  Outcome: Ongoing, Progressing     Problem: Pain (Stroke, Hemorrhagic)  Goal: Acceptable Pain Control  Outcome: Ongoing, Progressing     Problem: Respiratory Compromise (Stroke, Hemorrhagic)  Goal: Effective Oxygenation and Ventilation  Outcome: Ongoing, Progressing     Problem: Sensorimotor Impairment (Stroke, Hemorrhagic)  Goal: Improved Sensorimotor Function  Outcome: Ongoing, Progressing     Problem: Swallowing Impairment (Stroke, Hemorrhagic)  Goal: Optimal Eating and Swallowing Without  Aspiration  Outcome: Ongoing, Progressing     Problem: Urinary Elimination Impaired (Stroke, Hemorrhagic)  Goal: Effective Urinary Elimination  Outcome: Ongoing, Progressing     Problem: Infection  Goal: Absence of Infection Signs and Symptoms  Outcome: Ongoing, Progressing

## 2024-01-20 NOTE — ASSESSMENT & PLAN NOTE
Patient has hyponatremia which is controlled,We will aim to correct the sodium by 4-6mEq in 24 hours. We will monitor sodium Every 12 hours. The hyponatremia is due to presumed dehydration versus SIADH versus COVID. We will obtain the following studies: Urine sodium, urine osmolality, serum osmolality. We will treat the hyponatremia with Fluid restriction of:  1.5 liter per day. The patient's sodium results have been reviewed and are listed below.  Recent Labs   Lab 01/20/24  0809        A BMP daily, DC hypertonic saline use just regular normal saline at this

## 2024-01-20 NOTE — SUBJECTIVE & OBJECTIVE
Interval History: 1 day    Review of Systems   Constitutional:  Positive for fatigue.   HENT: Negative.     Eyes: Negative.    Respiratory: Negative.     Cardiovascular: Negative.    Gastrointestinal:  Positive for constipation.   Endocrine: Negative.    Genitourinary: Negative.    Musculoskeletal:  Positive for neck pain.   Skin: Negative.    Neurological:  Positive for headaches. Negative for dizziness, speech difficulty, weakness, light-headedness and numbness.   Hematological: Negative.    Psychiatric/Behavioral: Negative.       Objective:     Vital Signs (Most Recent):  Temp: 98 °F (36.7 °C) (01/20/24 0943)  Pulse: 79 (01/20/24 0943)  Resp: 18 (01/20/24 0414)  BP: (!) 183/87 (01/20/24 0943)  SpO2: 95 % (01/20/24 0943) Vital Signs (24h Range):  Temp:  [97.6 °F (36.4 °C)-98.5 °F (36.9 °C)] 98 °F (36.7 °C)  Pulse:  [68-84] 79  Resp:  [16-18] 18  SpO2:  [92 %-95 %] 95 %  BP: (129-183)/(68-87) 183/87     Weight: 41.9 kg (92 lb 6 oz)  Body mass index is 17.45 kg/m².    Intake/Output Summary (Last 24 hours) at 1/20/2024 1125  Last data filed at 1/19/2024 1700  Gross per 24 hour   Intake 180 ml   Output 200 ml   Net -20 ml         Physical Exam  Constitutional:       General: She is not in acute distress.     Appearance: Normal appearance.   HENT:      Head: Normocephalic.      Right Ear: Tympanic membrane normal.      Left Ear: Tympanic membrane normal.      Nose: Nose normal.      Mouth/Throat:      Mouth: Mucous membranes are moist.      Pharynx: Oropharynx is clear.   Eyes:      Extraocular Movements: Extraocular movements intact.      Pupils: Pupils are equal, round, and reactive to light.   Cardiovascular:      Rate and Rhythm: Normal rate and regular rhythm.      Pulses: Normal pulses.      Heart sounds: Normal heart sounds. No murmur heard.  Pulmonary:      Effort: Pulmonary effort is normal.      Breath sounds: Normal breath sounds.   Abdominal:      General: Abdomen is flat. There is no distension.       Palpations: Abdomen is soft.      Tenderness: There is no abdominal tenderness.   Musculoskeletal:         General: Normal range of motion.      Cervical back: Normal range of motion.   Skin:     General: Skin is warm.      Capillary Refill: Capillary refill takes less than 2 seconds.   Neurological:      General: No focal deficit present.      Mental Status: She is alert and oriented to person, place, and time. Mental status is at baseline.      Sensory: No sensory deficit.      Motor: No weakness.   Psychiatric:         Mood and Affect: Mood normal.         Behavior: Behavior normal.         Thought Content: Thought content normal.         Judgment: Judgment normal.             Significant Labs: All pertinent labs within the past 24 hours have been reviewed.  CBC:   Recent Labs   Lab 01/18/24  1350 01/19/24  0502 01/20/24  0639   WBC 8.03 6.37 5.43   HGB 11.9* 10.7* 10.3*   HCT 35.3* 31.6* 30.6*    197 184     CMP:   Recent Labs   Lab 01/18/24  1350 01/19/24  0502 01/19/24  1148 01/19/24  1736 01/20/24  0014 01/20/24  0639 01/20/24  0809   * 129*   < > 132 134 134 133   K 4.1 3.9  --  3.6  --  3.5 3.3*   CO2 26 26  --  23  --  22* 22*   BUN 14.0 12.0  --  13.0  --  15.0 14.0   CREATININE 0.57 0.58  --  0.54*  --  0.55 0.52*   CALCIUM 8.9 8.7  --  8.5  --  8.5 8.5   ALBUMIN 3.3* 3.2*  --   --   --  2.9*  --    BILITOT 0.4 0.7  --   --   --  0.6  --    ALKPHOS 96 81  --   --   --  70  --    AST 44* 40*  --   --   --  39*  --    ALT 49 44  --   --   --  40  --     < > = values in this interval not displayed.       Significant Imaging: I have reviewed all pertinent imaging results/findings within the past 24 hours.  CT: I have reviewed all pertinent results/findings within the past 24 hours and my personal findings are:  Subdural bleed

## 2024-01-20 NOTE — CONSULTS
Inpatient Nutrition Assessment    Admit Date: 1/18/2024   Total duration of encounter: 2 days   Patient Age: 89 y.o.    Nutrition Recommendation/Prescription     Continue current diet as tolerated and assist with intake.   Add yogurt on meal trays per pt's request.  Boost Plus, TID. Provides 360 kcal, 14 g protein per serving.  Monitor lytes and replete as needed.   Monitor intake, weight, and labs.     RD following and available as needed. Thank you.     Communication of Recommendations: reviewed with nurse and EMR.     Nutrition Assessment     Malnutrition Assessment/Nutrition-Focused Physical Exam    Unable to complete NFPE. Will attempt at f/u.     Chart Review    Reason Seen: physician consult for Needs.     Malnutrition Screening Tool Results   Have you recently lost weight without trying?: No  Have you been eating poorly because of a decreased appetite?: No   MST Score: 0   Diagnosis:  SDH (Subdural hematoma).    Relevant Medical History:   Spinal stenosis, lumbar region without neurogenic claudication     Nutrition-Related Medications:   IV Fluids: 0.9%NaCl@75mL/hr.   Protonix.         Calorie Containing IV Medications: no significant kcals from medications at this time    Nutrition-Related Labs:  1/20: H/H 10.3/30.6(L);  K+ (L); CO2 22(L); Crea 0.52(L); Alb 2.9(L); AST 39(H).    Nutrition Orders:   Diet heart healthy      Appetite/Oral Intake: fair/25-50% of meals    Factors Affecting Nutritional Intake: none identified    Food/Druze/Cultural Preferences:  Pt likes yogurt.     Food Allergies: none reported    Wound(s):       Last Bowel Movement: 01/19/24    Comments    1/20: Pt admitted with Na+  of 129. MD addressing and Na+ WNL today. Last recorded intake 50%. Pt request yogurt on meal trays. Kitchen notified of request and nursing reports pt is eating yogurt. No recent weight loss noted/reported on nursing admit screen. Noted pt's family does not want any heroic measures. Will add Boost Plus, TID, to  "assist with intake and continue to monitor during stay.      Anthropometrics    Height: 5' 0.98" (154.9 cm) Height Method: Stated  Last Weight: 41.9 kg (92 lb 6 oz) (24 0414) Weight Method: Bed Scale  BMI (Calculated): 17.5  BMI Classification: underweight (BMI less than 22 if >65 years of age)     Ideal Body Weight (IBW), Female: 104.9 lb     % Ideal Body Weight, Female (lb): 88.57 %                             Usual Weight Provided By: EMR weight history    Wt Readings from Last 5 Encounters:   24 41.9 kg (92 lb 6 oz)   23 45.4 kg (100 lb)   10/18/23 40.8 kg (90 lb)   23 43.1 kg (95 lb)   20 45.4 kg (100 lb 1.4 oz)     Weight Change(s) Since Admission:   Wt Readings from Last 1 Encounters:   24 0414 41.9 kg (92 lb 6 oz)   24 0500 42.2 kg (93 lb 0.6 oz)   24 1535 42.2 kg (93 lb)   24 1235 42.2 kg (93 lb)   Admit Weight: 42.2 kg (93 lb) (24 1235), Weight Method: Bed Scale    Estimated Needs    Weight Used For Calorie Calculations: 42 kg (92 lb 9.5 oz)  Energy Calorie Requirements (kcal): 1200 kcasl/day (30 kcals/kg/CBW).  Energy Need Method: Kcal/kg  Weight Used For Protein Calculations: 42 kg (92 lb 9.5 oz)  Protein Requirements: 50 to 65 g/day (1.2 to 1.5 g/kg/CBW).  Fluid Requirements (mL): 1250 mL/day (1mL/kcal) or per MD Guidance.  Temp (24hrs), Av.1 °F (36.7 °C), Min:97.8 °F (36.6 °C), Max:98.5 °F (36.9 °C)       Enteral Nutrition    Patient not receiving enteral nutrition at this time.    Parenteral Nutrition    Patient not receiving parenteral nutrition support at this time.    Evaluation of Received Nutrient Intake    Calories: 50% intake so far.   Protein: 50% intake so far.     Patient Education    Not applicable.    Nutrition Diagnosis     PES: Altered nutrition-related laboratory values related to  Current condition as evidenced by K+ 3.3. (new)    Interventions/Goals     Intervention(s): commercial beverage and collaboration with other " providers    Goal: Maintain weight throughout hospitalization. (new)    Monitoring & Evaluation     Dietitian will monitor food and beverage intake, energy intake, weight, weight change, electrolyte/renal panel, glucose/endocrine profile, and gastrointestinal profile.    Nutrition Risk/Follow-Up: moderate (follow-up in 3-5 days)   Please consult if re-assessment needed sooner.

## 2024-01-21 PROBLEM — M54.2 CERVICAL PAIN (NECK): Status: ACTIVE | Noted: 2024-01-21

## 2024-01-21 LAB
ANION GAP SERPL CALC-SCNC: 10 MEQ/L
ANION GAP SERPL CALC-SCNC: 9 MEQ/L
BASOPHILS # BLD AUTO: 0.02 X10(3)/MCL
BASOPHILS NFR BLD AUTO: 0.3 %
BUN SERPL-MCNC: 14 MG/DL (ref 9.8–20.1)
BUN SERPL-MCNC: 9 MG/DL (ref 9.8–20.1)
CALCIUM SERPL-MCNC: 8.3 MG/DL (ref 8.4–10.2)
CALCIUM SERPL-MCNC: 8.3 MG/DL (ref 8.4–10.2)
CHLORIDE SERPL-SCNC: 101 MMOL/L (ref 98–111)
CHLORIDE SERPL-SCNC: 99 MMOL/L (ref 98–111)
CO2 SERPL-SCNC: 19 MMOL/L (ref 23–31)
CO2 SERPL-SCNC: 22 MMOL/L (ref 23–31)
CREAT SERPL-MCNC: 0.55 MG/DL (ref 0.55–1.02)
CREAT SERPL-MCNC: 0.55 MG/DL (ref 0.55–1.02)
CREAT/UREA NIT SERPL: 16
CREAT/UREA NIT SERPL: 25
EOSINOPHIL # BLD AUTO: 0.02 X10(3)/MCL (ref 0–0.9)
EOSINOPHIL NFR BLD AUTO: 0.3 %
ERYTHROCYTE [DISTWIDTH] IN BLOOD BY AUTOMATED COUNT: 13.1 % (ref 11.5–17)
GFR SERPLBLD CREATININE-BSD FMLA CKD-EPI: >60 MLS/MIN/1.73/M2
GFR SERPLBLD CREATININE-BSD FMLA CKD-EPI: >60 MLS/MIN/1.73/M2
GLUCOSE SERPL-MCNC: 103 MG/DL (ref 75–121)
GLUCOSE SERPL-MCNC: 117 MG/DL (ref 75–121)
HCT VFR BLD AUTO: 32.6 % (ref 37–47)
HGB BLD-MCNC: 11 G/DL (ref 12–16)
IMM GRANULOCYTES # BLD AUTO: 0.03 X10(3)/MCL (ref 0–0.04)
IMM GRANULOCYTES NFR BLD AUTO: 0.4 %
LYMPHOCYTES # BLD AUTO: 0.65 X10(3)/MCL (ref 0.6–4.6)
LYMPHOCYTES NFR BLD AUTO: 9.6 %
MCH RBC QN AUTO: 31.5 PG (ref 27–31)
MCHC RBC AUTO-ENTMCNC: 33.7 G/DL (ref 33–36)
MCV RBC AUTO: 93.4 FL (ref 80–94)
MONOCYTES # BLD AUTO: 0.72 X10(3)/MCL (ref 0.1–1.3)
MONOCYTES NFR BLD AUTO: 10.6 %
NEUTROPHILS # BLD AUTO: 5.35 X10(3)/MCL (ref 2.1–9.2)
NEUTROPHILS NFR BLD AUTO: 78.8 %
PLATELET # BLD AUTO: 194 X10(3)/MCL (ref 130–400)
PMV BLD AUTO: 9.3 FL (ref 7.4–10.4)
POTASSIUM SERPL-SCNC: 3 MMOL/L (ref 3.5–5.1)
POTASSIUM SERPL-SCNC: 3.7 MMOL/L (ref 3.5–5.1)
RBC # BLD AUTO: 3.49 X10(6)/MCL (ref 4.2–5.4)
SODIUM SERPL-SCNC: 129 MMOL/L (ref 132–146)
SODIUM SERPL-SCNC: 131 MMOL/L (ref 132–146)
WBC # SPEC AUTO: 6.79 X10(3)/MCL (ref 4.5–11.5)

## 2024-01-21 PROCEDURE — A4217 STERILE WATER/SALINE, 500 ML: HCPCS | Performed by: FAMILY MEDICINE

## 2024-01-21 PROCEDURE — 80048 BASIC METABOLIC PNL TOTAL CA: CPT | Performed by: INTERNAL MEDICINE

## 2024-01-21 PROCEDURE — 27000207 HC ISOLATION

## 2024-01-21 PROCEDURE — 94761 N-INVAS EAR/PLS OXIMETRY MLT: CPT

## 2024-01-21 PROCEDURE — 11000001 HC ACUTE MED/SURG PRIVATE ROOM

## 2024-01-21 PROCEDURE — 85025 COMPLETE CBC W/AUTO DIFF WBC: CPT | Performed by: FAMILY MEDICINE

## 2024-01-21 PROCEDURE — A4216 STERILE WATER/SALINE, 10 ML: HCPCS | Performed by: INTERNAL MEDICINE

## 2024-01-21 PROCEDURE — 21400001 HC TELEMETRY ROOM

## 2024-01-21 PROCEDURE — 25000003 PHARM REV CODE 250: Performed by: FAMILY MEDICINE

## 2024-01-21 PROCEDURE — 99900035 HC TECH TIME PER 15 MIN (STAT)

## 2024-01-21 PROCEDURE — 25000003 PHARM REV CODE 250: Performed by: INTERNAL MEDICINE

## 2024-01-21 PROCEDURE — 63600175 PHARM REV CODE 636 W HCPCS: Performed by: FAMILY MEDICINE

## 2024-01-21 RX ORDER — DEXAMETHASONE SODIUM PHOSPHATE 4 MG/ML
4 INJECTION, SOLUTION INTRA-ARTICULAR; INTRALESIONAL; INTRAMUSCULAR; INTRAVENOUS; SOFT TISSUE ONCE
Status: DISCONTINUED | OUTPATIENT
Start: 2024-01-21 | End: 2024-01-27

## 2024-01-21 RX ORDER — POTASSIUM CHLORIDE 14.9 MG/ML
20 INJECTION INTRAVENOUS ONCE
Status: COMPLETED | OUTPATIENT
Start: 2024-01-21 | End: 2024-01-21

## 2024-01-21 RX ORDER — TRAMADOL HYDROCHLORIDE 50 MG/1
50 TABLET ORAL EVERY 6 HOURS PRN
Status: DISCONTINUED | OUTPATIENT
Start: 2024-01-21 | End: 2024-01-22

## 2024-01-21 RX ADMIN — GABAPENTIN 300 MG: 300 CAPSULE ORAL at 07:01

## 2024-01-21 RX ADMIN — POLYETHYLENE GLYCOL 3350 17 G: 17 POWDER, FOR SOLUTION ORAL at 07:01

## 2024-01-21 RX ADMIN — SODIUM CHLORIDE: 9 INJECTION, SOLUTION INTRAVENOUS at 02:01

## 2024-01-21 RX ADMIN — MUPIROCIN 1 G: 20 OINTMENT TOPICAL at 11:01

## 2024-01-21 RX ADMIN — ATORVASTATIN CALCIUM 40 MG: 40 TABLET, FILM COATED ORAL at 07:01

## 2024-01-21 RX ADMIN — SODIUM CHLORIDE, PRESERVATIVE FREE 10 ML: 5 INJECTION INTRAVENOUS at 01:01

## 2024-01-21 RX ADMIN — ACETAMINOPHEN 650 MG: 325 TABLET, FILM COATED ORAL at 06:01

## 2024-01-21 RX ADMIN — MUPIROCIN 1 G: 20 OINTMENT TOPICAL at 08:01

## 2024-01-21 RX ADMIN — PANTOPRAZOLE SODIUM 40 MG: 40 TABLET, DELAYED RELEASE ORAL at 06:01

## 2024-01-21 RX ADMIN — VASOPRESSIN: 20 INJECTION, SOLUTION INTRAVENOUS at 11:01

## 2024-01-21 RX ADMIN — POTASSIUM CHLORIDE 20 MEQ: 14.9 INJECTION, SOLUTION INTRAVENOUS at 11:01

## 2024-01-21 RX ADMIN — AMLODIPINE BESYLATE 5 MG: 5 TABLET ORAL at 07:01

## 2024-01-21 RX ADMIN — GABAPENTIN 300 MG: 300 CAPSULE ORAL at 08:01

## 2024-01-21 RX ADMIN — SODIUM CHLORIDE, PRESERVATIVE FREE 10 ML: 5 INJECTION INTRAVENOUS at 05:01

## 2024-01-21 RX ADMIN — SODIUM CHLORIDE, PRESERVATIVE FREE 10 ML: 5 INJECTION INTRAVENOUS at 11:01

## 2024-01-21 RX ADMIN — TRAMADOL HYDROCHLORIDE 50 MG: 50 TABLET, COATED ORAL at 08:01

## 2024-01-21 RX ADMIN — TRAMADOL HYDROCHLORIDE 50 MG: 50 TABLET, COATED ORAL at 11:01

## 2024-01-21 NOTE — ASSESSMENT & PLAN NOTE
Patient has hyponatremia which is controlled,We will aim to correct the sodium by 4-6mEq in 24 hours. We will monitor sodium Every 12 hours. The hyponatremia is due to presumed dehydration versus SIADH versus COVID. We will obtain the following studies: Urine sodium, urine osmolality, serum osmolality. We will treat the hyponatremia with Fluid restriction of:  1.5 liter per day. The patient's sodium results have been reviewed and are listed below.  Recent Labs   Lab 01/21/24  0755   *     A Bmp bid, DC hypertonic saline use just regular normal saline at this time, sodium is 132, will follow

## 2024-01-21 NOTE — PROGRESS NOTES
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  Patient also presents with hyponatremia semi of 131.  Discussion with the family in my previous BERNARD for seen her early this Monday, she reported with improving strength, appetite.  She had a left lower quadrant anopsia from previous right-sided MCA infarct.  Doing better but we recommended her to continue PT at the nursing home.    I was contacted by Dr. Nixon from the ER which point he discussed me and his preliminary workup of the patient's bleeds.  No mentioned of the patient being COVID was made to me, nonetheless I see in the chart this evening.    Patient denies any particular pain.  She is able to get up and move to the commode with maximal assist.  She does have some communication issues and she does look to have a little bit of disorientation    Collateral information with the family agrees with regards to the patient.  The daughter in [facility] was contacted about the patient being COVID positive.  The daughter-in-law Liv noted today that the patient had fallen in a manner while she was waiting to be taken to the bathroom.    Upon my discussion with the ER doctor, he had a further discussion with the patient and the family power-of- and daughter Ms. Sorto.  They did not want any aggressive measures.  They wanted to keep her in [place] without any neurosurgical consultation for management of the intracranial bleeds.  Please see Dr. Nixon is  documentation    Overview/Hospital Course:  More somnolant today.  She is awake and alert conversing with me yesterday.  stating she has had a bit of neck pain, and still has some headache.      Blood pressure has been elevated.  She says she has no changes in her vision she is awake and alert she can tell me the day of the weekend she recognized me.  Shortness a breath no chest pains at this time no nausea vomiting  Sodium corrected yesterday , now 132  chloride wnl      No new subjective & objective note has been filed under this hospital service since the last note was generated.      Assessment/Plan:      * SDH (subdural hematoma)  At this point monitor blood pressures.  Will put p.r.n..  I will repeat CT in the morning to evaluate any advancement.  Conservative measures.  Discussed with daughter who is power-of-.  No heroic measures no neurosurgery consultation.  Will repeat ct today as some mental status changes      COVID-19  Patient is identified as low risk  Initiate standard COVID protocols; COVID-19 testing ,Infection Control notification  and isolation- respiratory, contact and droplet per protocol    Diagnostics: CBC, CMP, Ferritin, CRP, and Portable CXR    Management: Inhaled bronchodilators as needed for shortness of breath.    Advance Care Planning Current advance care plan has been discussed with patient/family/POA and patient currently wishes DNR (Do Not Resuscitate).     Scalp laceration    Monitor clinically.  Clean and dress daily    Lower extremity pain, anterior, unspecified laterality  Ical therapy for strengthening.  Patient on gabapentin.  This is an L5 nerve radiculopathy  We will consult    Hyponatremia  Patient has hyponatremia which is controlled,We will aim to correct the sodium by 4-6mEq in 24 hours. We will monitor sodium Every 12 hours. The hyponatremia is due to presumed dehydration versus SIADH versus COVID. We will obtain the following studies: Urine sodium, urine  osmolality, serum osmolality. We will treat the hyponatremia with Fluid restriction of:  1.5 liter per day. The patient's sodium results have been reviewed and are listed below.  Recent Labs   Lab 01/21/24  0755   *     A Bmp bid, DC hypertonic saline use just regular normal saline at this time, sodium is 132, will follow      VTE Risk Mitigation (From admission, onward)           Ordered     IP VTE HIGH RISK PATIENT  Once         01/18/24 2056     Place sequential compression device  Until discontinued         01/18/24 2056                    Discharge Planning   SANDY:      Code Status: DNR   Is the patient medically ready for discharge?:     Reason for patient still in hospital (select all that apply): Patient trending condition                     Williams Yo MD  Department of Hospital Medicine   [facility]

## 2024-01-21 NOTE — ASSESSMENT & PLAN NOTE
At this point monitor blood pressures.  Will put p.r.n..  I will repeat CT in the morning to evaluate any advancement.  Conservative measures.  Discussed with daughter who is power-of-.  No heroic measures no neurosurgery consultation.  Will repeat ct today as some mental status changes

## 2024-01-21 NOTE — PLAN OF CARE
Problem: Adult Inpatient Plan of Care  Goal: Plan of Care Review  Outcome: Ongoing, Progressing  Goal: Optimal Comfort and Wellbeing  Outcome: Ongoing, Progressing     Problem: Fall Injury Risk  Goal: Absence of Fall and Fall-Related Injury  Outcome: Ongoing, Progressing     Problem: Adjustment to Illness (Stroke, Hemorrhagic)  Goal: Optimal Coping  Outcome: Ongoing, Progressing     Problem: Pain (Stroke, Hemorrhagic)  Goal: Acceptable Pain Control  Outcome: Ongoing, Progressing     Problem: Infection  Goal: Absence of Infection Signs and Symptoms  Outcome: Ongoing, Progressing

## 2024-01-22 LAB
BASOPHILS # BLD AUTO: 0.02 X10(3)/MCL
BASOPHILS NFR BLD AUTO: 0.3 %
EOSINOPHIL # BLD AUTO: 0.03 X10(3)/MCL (ref 0–0.9)
EOSINOPHIL NFR BLD AUTO: 0.5 %
ERYTHROCYTE [DISTWIDTH] IN BLOOD BY AUTOMATED COUNT: 12.6 % (ref 11.5–17)
HCT VFR BLD AUTO: 32.2 % (ref 37–47)
HGB BLD-MCNC: 11 G/DL (ref 12–16)
IMM GRANULOCYTES # BLD AUTO: 0.02 X10(3)/MCL (ref 0–0.04)
IMM GRANULOCYTES NFR BLD AUTO: 0.3 %
LYMPHOCYTES # BLD AUTO: 0.86 X10(3)/MCL (ref 0.6–4.6)
LYMPHOCYTES NFR BLD AUTO: 14.4 %
MCH RBC QN AUTO: 31.5 PG (ref 27–31)
MCHC RBC AUTO-ENTMCNC: 34.2 G/DL (ref 33–36)
MCV RBC AUTO: 92.3 FL (ref 80–94)
MONOCYTES # BLD AUTO: 0.78 X10(3)/MCL (ref 0.1–1.3)
MONOCYTES NFR BLD AUTO: 13.1 %
NEUTROPHILS # BLD AUTO: 4.25 X10(3)/MCL (ref 2.1–9.2)
NEUTROPHILS NFR BLD AUTO: 71.4 %
PLATELET # BLD AUTO: 189 X10(3)/MCL (ref 130–400)
PMV BLD AUTO: 9.5 FL (ref 7.4–10.4)
RBC # BLD AUTO: 3.49 X10(6)/MCL (ref 4.2–5.4)
SODIUM SERPL-SCNC: 128 MMOL/L (ref 132–146)
SODIUM SERPL-SCNC: 129 MMOL/L (ref 132–146)
SODIUM SERPL-SCNC: 130 MMOL/L (ref 132–146)
SODIUM SERPL-SCNC: 130 MMOL/L (ref 132–146)
WBC # SPEC AUTO: 5.96 X10(3)/MCL (ref 4.5–11.5)

## 2024-01-22 PROCEDURE — 97530 THERAPEUTIC ACTIVITIES: CPT

## 2024-01-22 PROCEDURE — 99900035 HC TECH TIME PER 15 MIN (STAT)

## 2024-01-22 PROCEDURE — 21400001 HC TELEMETRY ROOM

## 2024-01-22 PROCEDURE — 25000003 PHARM REV CODE 250: Performed by: INTERNAL MEDICINE

## 2024-01-22 PROCEDURE — 25000003 PHARM REV CODE 250: Performed by: FAMILY MEDICINE

## 2024-01-22 PROCEDURE — 94761 N-INVAS EAR/PLS OXIMETRY MLT: CPT

## 2024-01-22 PROCEDURE — 92610 EVALUATE SWALLOWING FUNCTION: CPT

## 2024-01-22 PROCEDURE — A4216 STERILE WATER/SALINE, 10 ML: HCPCS | Performed by: INTERNAL MEDICINE

## 2024-01-22 PROCEDURE — A4217 STERILE WATER/SALINE, 500 ML: HCPCS | Performed by: FAMILY MEDICINE

## 2024-01-22 PROCEDURE — 84295 ASSAY OF SERUM SODIUM: CPT | Performed by: FAMILY MEDICINE

## 2024-01-22 PROCEDURE — 85025 COMPLETE CBC W/AUTO DIFF WBC: CPT | Performed by: FAMILY MEDICINE

## 2024-01-22 PROCEDURE — 27000207 HC ISOLATION

## 2024-01-22 PROCEDURE — 63600175 PHARM REV CODE 636 W HCPCS: Performed by: FAMILY MEDICINE

## 2024-01-22 RX ORDER — DOCUSATE SODIUM 50 MG/5ML
50 LIQUID ORAL 2 TIMES DAILY
Status: DISCONTINUED | OUTPATIENT
Start: 2024-01-22 | End: 2024-01-30 | Stop reason: HOSPADM

## 2024-01-22 RX ORDER — BISACODYL 10 MG/1
10 SUPPOSITORY RECTAL DAILY
Status: DISCONTINUED | OUTPATIENT
Start: 2024-01-22 | End: 2024-01-30 | Stop reason: HOSPADM

## 2024-01-22 RX ADMIN — SODIUM CHLORIDE, PRESERVATIVE FREE 10 ML: 5 INJECTION INTRAVENOUS at 05:01

## 2024-01-22 RX ADMIN — DOCUSATE SODIUM LIQUID 50 MG: 100 LIQUID ORAL at 09:01

## 2024-01-22 RX ADMIN — GABAPENTIN 300 MG: 300 CAPSULE ORAL at 09:01

## 2024-01-22 RX ADMIN — MUPIROCIN 1 G: 20 OINTMENT TOPICAL at 09:01

## 2024-01-22 RX ADMIN — TRAMADOL HYDROCHLORIDE 50 MG: 50 TABLET, COATED ORAL at 05:01

## 2024-01-22 RX ADMIN — VASOPRESSIN: 20 INJECTION, SOLUTION INTRAVENOUS at 03:01

## 2024-01-22 RX ADMIN — AMLODIPINE BESYLATE 5 MG: 5 TABLET ORAL at 09:01

## 2024-01-22 RX ADMIN — ATORVASTATIN CALCIUM 40 MG: 40 TABLET, FILM COATED ORAL at 09:01

## 2024-01-22 RX ADMIN — BISACODYL 10 MG: 10 SUPPOSITORY RECTAL at 09:01

## 2024-01-22 RX ADMIN — POLYETHYLENE GLYCOL 3350 17 G: 17 POWDER, FOR SOLUTION ORAL at 09:01

## 2024-01-22 RX ADMIN — SODIUM CHLORIDE, PRESERVATIVE FREE 10 ML: 5 INJECTION INTRAVENOUS at 11:01

## 2024-01-22 NOTE — PT/OT/SLP PROGRESS
Physical Therapy Treatment    Patient Name:  Bhavani Galarza   MRN:  [id number]    Recommendations:     Discharge Recommendations:    Discharge Equipment Recommendations:  (to be determined once activity limitations are lifted)  Barriers to discharge: Decreased caregiver support    Assessment:     Bhavani Galarza is a 89 y.o. female admitted with a medical diagnosis of SDH (subdural hematoma).  She presents with the following impairments/functional limitations: pain, weakness  Rehab Prognosis: Fair; patient would benefit from acute skilled PT services to address these deficits and reach maximum level of function.    Recent Surgery: * No surgery found *      Plan:     During this hospitalization, patient to be seen 5 x/week to address the identified rehab impairments via therapeutic exercises and progress toward the following goals:    Plan of Care Expires:       Subjective     Chief Complaint: headache, weakness  Patient/Family Comments/goals: to get up to BSC today for BM  Pain/Comfort:         Objective:     Communicated with pt and caregiver prior to session.  Patient found HOB elevated with   upon PT entry to room.     General Precautions: Standard,    Orthopedic Precautions:    Braces:    Respiratory Status: Room air     Functional Mobility:  Bed Mobility:     Scooting: maximal assistance  Supine to Sit: maximal assistance  Transfers:     Toilet Transfer: Ax2 helpers with  gt belt  using  Squat Pivot      AM-PAC 6 CLICK MOBILITY          Treatment & Education:  Pt with profound weakness, left hemiparesis.  Pt had BM on BSC and needed maxA x2 helpers to clean her and tf back to bed and don diaper.    Patient left HOB elevated with all lines intact, call button in reach, and bed alarm on..    GOALS:   Multidisciplinary Problems       Physical Therapy Goals          Problem: Physical Therapy    Goal Priority Disciplines Outcome Goal Variances Interventions   Physical Therapy Goal     PT, PT/OT Ongoing,  Progressing     Description: 1.  Pt to be instructed in safe, appropriate exercises in bed to maintain BLE AROM and strength while on current activity limitations today 1-19-24.  2.  Pt to be reassessed and MD/Nurse to be consulted prior to further PT on Monday 1-22-24 in regards to activity limitations and medical status.                       Time Tracking:     PT Received On: 01/22/24  PT Start Time: 1020     PT Stop Time: 1035  PT Total Time (min): 15 min     Billable Minutes: Therapeutic Activity 15    Treatment Type: Treatment  PT/PTA: PT           01/22/2024

## 2024-01-22 NOTE — PT/OT/SLP EVAL
Speech Language Pathology Evaluation  Bedside Swallow    Patient Name:  Bhavani Galarza   MRN:  [id number]  Admitting Diagnosis: SDH (subdural hematoma)    Recommendations:                 General Recommendations:  Dysphagia therapy and Cognitive-linguistic evaluation  Diet recommendations:  Soft & Bite Sized Diet - IDDSI Level 6, Thin   Aspiration Precautions: 1 bite/sip at a time, Alternating bites/sips, Assistance with meals, HOB to 90 degrees, Remain upright 30 minutes post meal, Small bites/sips, and Standard aspiration precautions   General Precautions: Standard, airborne  Communication strategies:  hearing aids    Assessment:     Bhavani Galarza is a 89 y.o. female with an SLP diagnosis of Dysphagia.  She presents with generalized weakness and suspected cognitive impairment. Pt severely [place] which made participation with assessment very difficult. Tolerated trials of thin liquids. Cough noted x 1.    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;  Service: Pain Management;  Laterality: Right;    TONSILLECTOMY      TRANSFORAMINAL EPIDURAL INJECTION OF STEROID Right 09/22/2023    Procedure: INJECTION, STEROID, EPIDURAL, TRANSFORAMINAL APPROACH (Right TFESI L4 & L5);  Surgeon: Amrit Song MD;  Location: [facility] OR;  Service: Pain Management;  Laterality: Right;       Social History: Patient was a SNF prior to admission.    Prior Intubation HX:  See chart    Modified Barium Swallow: unknown    Chest X-Rays: see chart    Prior diet: regular .    Occupation/hobbies/homemaking: unknown.    Subjective     Pt alert and oriented. With c/o pain but reported had  just received pain meds. No family present. Very [place] and reported not having hearing aids.  Patient goals: None stated     Pain/Comfort:  Pain Rating 1: 6/10  Location 1:  (legs and head)    Respiratory Status: Room air    Objective:     Oral Musculature Evaluation  Oral Musculature: WFL    Bedside Swallow Eval:   Consistencies Assessed:  Thin liquids via straw      Oral Phase:   WFL    Pharyngeal Phase:   coughing/choking x 1  Audible gulping which could be indicative of swallow delay    Compensatory Strategies  None tested    Treatment: Dysphagia tx, further cognitive-linguistic testing as able, diet tolerance monitoring.     Goals:   Multidisciplinary Problems       SLP Goals          Problem: SLP    Goal Priority Disciplines Outcome   SLP Goal     SLP Ongoing, Progressing   Description: LTG:  -Pt will maintain adequate hydration/nutrition with optimum safety and efficiency of swallowing function on P.O. intake without overt signs and symptoms of aspiration for the highest appropriate diet level.    -Pt will utilize compensatory strategies with optimum safety and efficiency of swallowing function on P.O. intake without overt signs and symptoms of aspiration for the highest appropriate diet level.        STG:  -Pt will complete a Modified Barium Swallow Study to fully assess physiology and anatomy of the swallow and to determine the appropriate diet and/or rehabilitation exercises/POC.    -Pt will tolerate diet upgrade trials without signs and/or symptoms of aspiration with to safely least restrictive diet with (min/mod/max) verbal, visual and tactile cues.      -Pt will safely ingest diet trials during therapeutic feedings with the SLP without signs and/or symptoms of aspiration with to safely consume least restrictive diet with (min/mod/max) verbal, visual and tactile cues.                         Plan:     Patient to be seen:   (3-5 x/week)   Plan of Care expires:  02/05/24  Plan of Care reviewed with:  Care  team  SLP Follow-Up: yes      Discharge recommendations: Continue skilled ST services  Barriers to Discharge:  Level of Skilled Assistance Needed   and Safety Awareness      Time Tracking:     SLP Treatment Date:   01/22/24  Speech Start Time:  1038  Speech Stop Time:  1051     Speech Total Time (min):  13 min    Billable Minutes: Eval Swallow and Oral Function 13 min/ 1 unit    Marylu Calhoun MS, CCC-SLP  01/22/2024

## 2024-01-22 NOTE — PLAN OF CARE
Problem: Adult Inpatient Plan of Care  Goal: Plan of Care Review  Outcome: Ongoing, Progressing  Goal: Optimal Comfort and Wellbeing  Outcome: Ongoing, Progressing     Problem: Fall Injury Risk  Goal: Absence of Fall and Fall-Related Injury  Outcome: Ongoing, Progressing     Problem: Pain Acute  Goal: Acceptable Pain Control and Functional Ability  Outcome: Ongoing, Progressing     Problem: Adjustment to Illness (Stroke, Hemorrhagic)  Goal: Optimal Coping  Outcome: Ongoing, Progressing     Problem: Pain (Stroke, Hemorrhagic)  Goal: Acceptable Pain Control  Outcome: Ongoing, Progressing     Problem: Infection  Goal: Absence of Infection Signs and Symptoms  Outcome: Ongoing, Progressing

## 2024-01-22 NOTE — PLAN OF CARE
Problem: SLP  Goal: SLP Goal  Description: LTG:  -Pt will maintain adequate hydration/nutrition with optimum safety and efficiency of swallowing function on P.O. intake without overt signs and symptoms of aspiration for the highest appropriate diet level.    -Pt will utilize compensatory strategies with optimum safety and efficiency of swallowing function on P.O. intake without overt signs and symptoms of aspiration for the highest appropriate diet level.        STG:  -Pt will complete a Modified Barium Swallow Study to fully assess physiology and anatomy of the swallow and to determine the appropriate diet and/or rehabilitation exercises/POC.    -Pt will tolerate diet upgrade trials without signs and/or symptoms of aspiration with to safely least restrictive diet with (min/mod/max) verbal, visual and tactile cues.      -Pt will safely ingest diet trials during therapeutic feedings with the SLP without signs and/or symptoms of aspiration with to safely consume least restrictive diet with (min/mod/max) verbal, visual and tactile cues.    Outcome: Ongoing, Progressing

## 2024-01-22 NOTE — PLAN OF CARE
Per Farnaz ayala/[facility], they can take her back to SNF with 24 hr. Sitters, once this is arranged by the daughter.  List of [employer] given to the sitter in the room to give to daughter.  Will call daughter also.    NH is able to take her back with COVID.

## 2024-01-22 NOTE — PLAN OF CARE
Spoke to daughter Noreen and emailed her a [employer] list so she can work on 24hr sitters at the NH. Email: [email or web address]    Linnette also faxed to [employer], as requested by Noreen, and noted to them to call her ASAP

## 2024-01-22 NOTE — PLAN OF CARE
01/19/24 1354   Discharge Assessment   Assessment Type Discharge Planning Assessment   Source of Information health record   People in Home facility resident   Facility Arrived From: [facility]   Do you expect to return to your current living situation? No   Do you have help at home or someone to help you manage your care at home? No   Discharge Plan A Skilled Nursing Facility   Discharge Plan B Skilled Nursing Facility   DME Needed Upon Discharge  none   Discharge Plan discussed with: Adult children   Transition of Care Barriers None   Physical Activity   On average, how many days per week do you engage in moderate to strenuous exercise (like a brisk walk)? Pt Declined   On average, how many minutes do you engage in exercise at this level? Pt Declined   Financial Resource Strain   How hard is it for you to pay for the very basics like food, housing, medical care, and heating? Pt Declined   Housing Stability   In the last 12 months, was there a time when you were not able to pay the mortgage or rent on time? Pt Declined   In the last 12 months, was there a time when you did not have a steady place to sleep or slept in a shelter (including now)? Pt Declined   Transportation Needs   In the past 12 months, has lack of transportation kept you from medical appointments or from getting medications? Pt Declined   In the past 12 months, has lack of transportation kept you from meetings, work, or from getting things needed for daily living? Pt Declined   Food Insecurity   Within the past 12 months, you worried that your food would run out before you got the money to buy more. Pt Declined   Within the past 12 months, the food you bought just didn't last and you didn't have money to get more. Pt Declined   Stress   Do you feel stress - tense, restless, nervous, or anxious, or unable to sleep at night because your mind is troubled all the time - these days? Pt Declined   Social Connections   In a typical week, how many  times do you talk on the phone with family, friends, or neighbors? Pt Declined   How often do you get together with friends or relatives? Pt Declined   How often do you attend Moravian or [employer] services? Pt Declined   Do you belong to any clubs or organizations such as Moravian groups, unions, fraternal or athletic groups, or school groups? Pt Declined   How often do you attend meetings of the clubs or organizations you belong to? Pt Declined   Are you , , , , never , or living with a partner? Pt Declined   Alcohol Use   Q1: How often do you have a drink containing alcohol? Pt Declined   Q2: How many drinks containing alcohol do you have on a typical day when you are drinking? Pt Declined   Q3: How often do you have six or more drinks on one occasion? Pt Declined     From skilled at [facility].  Dr. Samaniego wants to know if [facility] will take her back on skilled and with 24 hr. Paid sitters by fly  Messaged [facility] and sent updates.

## 2024-01-23 LAB
SODIUM SERPL-SCNC: 127 MMOL/L (ref 132–146)
SODIUM SERPL-SCNC: 131 MMOL/L (ref 132–146)
SODIUM SERPL-SCNC: 132 MMOL/L (ref 132–146)
SODIUM SERPL-SCNC: 133 MMOL/L (ref 132–146)

## 2024-01-23 PROCEDURE — 63600175 PHARM REV CODE 636 W HCPCS: Performed by: INTERNAL MEDICINE

## 2024-01-23 PROCEDURE — A4217 STERILE WATER/SALINE, 500 ML: HCPCS | Performed by: FAMILY MEDICINE

## 2024-01-23 PROCEDURE — 84295 ASSAY OF SERUM SODIUM: CPT | Performed by: FAMILY MEDICINE

## 2024-01-23 PROCEDURE — 99900035 HC TECH TIME PER 15 MIN (STAT)

## 2024-01-23 PROCEDURE — A4216 STERILE WATER/SALINE, 10 ML: HCPCS | Performed by: INTERNAL MEDICINE

## 2024-01-23 PROCEDURE — 25000003 PHARM REV CODE 250: Performed by: INTERNAL MEDICINE

## 2024-01-23 PROCEDURE — 21400001 HC TELEMETRY ROOM

## 2024-01-23 PROCEDURE — 27000207 HC ISOLATION

## 2024-01-23 PROCEDURE — 63600175 PHARM REV CODE 636 W HCPCS: Performed by: FAMILY MEDICINE

## 2024-01-23 PROCEDURE — 97530 THERAPEUTIC ACTIVITIES: CPT

## 2024-01-23 PROCEDURE — 94761 N-INVAS EAR/PLS OXIMETRY MLT: CPT

## 2024-01-23 PROCEDURE — 25000003 PHARM REV CODE 250: Performed by: FAMILY MEDICINE

## 2024-01-23 PROCEDURE — 97116 GAIT TRAINING THERAPY: CPT

## 2024-01-23 RX ORDER — 3% SODIUM CHLORIDE 3 G/100ML
25 INJECTION, SOLUTION INTRAVENOUS CONTINUOUS
Status: DISCONTINUED | OUTPATIENT
Start: 2024-01-23 | End: 2024-01-24

## 2024-01-23 RX ADMIN — ATORVASTATIN CALCIUM 40 MG: 40 TABLET, FILM COATED ORAL at 10:01

## 2024-01-23 RX ADMIN — GABAPENTIN 300 MG: 300 CAPSULE ORAL at 10:01

## 2024-01-23 RX ADMIN — BISACODYL 10 MG: 10 SUPPOSITORY RECTAL at 10:01

## 2024-01-23 RX ADMIN — ACETAMINOPHEN 650 MG: 325 TABLET, FILM COATED ORAL at 11:01

## 2024-01-23 RX ADMIN — VASOPRESSIN: 20 INJECTION, SOLUTION INTRAVENOUS at 07:01

## 2024-01-23 RX ADMIN — SODIUM CHLORIDE, PRESERVATIVE FREE 10 ML: 5 INJECTION INTRAVENOUS at 03:01

## 2024-01-23 RX ADMIN — MUPIROCIN 1 G: 20 OINTMENT TOPICAL at 11:01

## 2024-01-23 RX ADMIN — AMLODIPINE BESYLATE 5 MG: 5 TABLET ORAL at 09:01

## 2024-01-23 RX ADMIN — SODIUM CHLORIDE, PRESERVATIVE FREE 10 ML: 5 INJECTION INTRAVENOUS at 05:01

## 2024-01-23 RX ADMIN — SODIUM CHLORIDE, PRESERVATIVE FREE 10 ML: 5 INJECTION INTRAVENOUS at 12:01

## 2024-01-23 RX ADMIN — DOCUSATE SODIUM LIQUID 50 MG: 100 LIQUID ORAL at 10:01

## 2024-01-23 RX ADMIN — GABAPENTIN 300 MG: 300 CAPSULE ORAL at 11:01

## 2024-01-23 RX ADMIN — POLYETHYLENE GLYCOL 3350 17 G: 17 POWDER, FOR SOLUTION ORAL at 10:01

## 2024-01-23 RX ADMIN — MUPIROCIN 1 G: 20 OINTMENT TOPICAL at 03:01

## 2024-01-23 RX ADMIN — ACETAMINOPHEN 650 MG: 325 TABLET, FILM COATED ORAL at 10:01

## 2024-01-23 RX ADMIN — SODIUM CHLORIDE 25 ML/HR: 3 INJECTION, SOLUTION INTRAVENOUS at 09:01

## 2024-01-23 NOTE — PT/OT/SLP PROGRESS
Physical Therapy Treatment    Patient Name:  Bhavani Galarza   MRN:  [id number]    Recommendations:     Discharge Recommendations:    Discharge Equipment Recommendations:  (to be determined once activity limitations are lifted)  Barriers to discharge: Decreased caregiver support    Assessment:     Bhavani Galarza is a 89 y.o. female admitted with a medical diagnosis of SDH (subdural hematoma).  She presents with the following impairments/functional limitations: pain, weakness  Rehab Prognosis: Fair; patient would benefit from acute skilled PT services to address these deficits and reach maximum level of function.    Recent Surgery: * No surgery found *      Plan:     During this hospitalization, patient to be seen 5 x/week to address the identified rehab impairments via therapeutic exercises and progress toward the following goals:    Plan of Care Expires:       Subjective     Chief Complaint: headache, weakness  Patient/Family Comments/goals: to get up to [facility] [place]  Pain/Comfort:         Objective:     Communicated with pt and caregiver prior to session.  Patient found HOB elevated with   upon PT entry to room.     General Precautions: Standard,    Orthopedic Precautions:    Braces:    Respiratory Status: Room air     Functional Mobility:  Bed Mobility:     Scooting: maximal assistance  Supine to Sit: maximal assistance  Transfers:     Toilet Transfer: Ax2 helpers with  gt belt  using  Squat Pivot  Amb to chair HHAx2 helpers      AM-PAC 6 CLICK MOBILITY          Treatment & Education:  Pt with profound weakness, left hemiparesis.    Patient left up in chair with chair alarm on, all lines intact, call bell in reach, caregiver with pt.GOALS:   Multidisciplinary Problems       Physical Therapy Goals          Problem: Physical Therapy    Goal Priority Disciplines Outcome Goal Variances Interventions   Physical Therapy Goal     PT, PT/OT Ongoing, Progressing     Description: 1.  Pt to be instructed in safe,  appropriate exercises in bed to maintain BLE AROM and strength while on current activity limitations today 1-19-24.  2.  Pt to be reassessed and MD/Nurse to be consulted prior to further PT on Monday 1-22-24 in regards to activity limitations and medical status.                       Time Tracking:     PT Received On: 01/23/24  PT Start Time: 1115     PT Stop Time: 1145  PT Total Time (min): 30 min     Billable Minutes: Therapeutic Activity 15  gt training 15    Treatment Type: Treatment  PT/PTA: PT           01/23/2024

## 2024-01-23 NOTE — PROGRESS NOTES
[facility]  [address] [place] [address]  Phone: [phone]    (Hospital) Internal Medicine  Progress Note      PATIENT NAME: Bhavani Galarza  MRN: [id number]  TODAY'S DATE: 01/22/2024  ADMIT DATE: 1/18/2024    SUBJECTIVE     PRINCIPLE PROBLEM: SDH (subdural hematoma)    INTERVAL HISTORY:    1/22/2024  Over the weekend the patient showed some development of a left-sided hemiparesis that is more so in the upper extremity.  Speech is stable with some deficits.  Patient is also eating and drinking.  She completed some constipation which has been effectively relieved with suppository and some Colace.    Lengthy discussion with daughter alert and orient the bedside.      The patient was participating with physical therapy but needs max assist      Review of patient's allergies indicates:  No Known Allergies    ROS mild dysarthria, weakness, oozing from the contusion of the back of her head, left-sided hemiparesis upper extremity, constipation    OBJECTIVE     VITAL SIGNS (Most Recent)  Temp: 98.3 °F (36.8 °C) (01/22/24 1900)  Pulse: 77 (01/22/24 1900)  Resp: 20 (01/22/24 1900)  BP: (!) 141/71 (01/22/24 1900)  SpO2: 95 % (01/22/24 1900)    VENTILATION STATUS  Resp: 20 (01/22/24 1900)  SpO2: 95 % (01/22/24 1900)           I & O (Last 24H):  Intake/Output Summary (Last 24 hours) at 1/22/2024 220  Last data filed at 1/22/2024 0950  Gross per 24 hour   Intake 300 ml   Output 525 ml   Net -225 ml       WEIGHTS  Wt Readings from Last 3 Encounters:   01/21/24 0002 45.3 kg (99 lb 13.9 oz)   01/20/24 0414 41.9 kg (92 lb 6 oz)   01/19/24 0500 42.2 kg (93 lb 0.6 oz)   01/18/24 1535 42.2 kg (93 lb)   01/18/24 1235 42.2 kg (93 lb)   12/26/23 1719 45.4 kg (100 lb)   12/26/23 1050 45.4 kg (100 lb)   10/18/23 1343 40.8 kg (90 lb)       Physical Exam    Patient is alert and oriented x3.  Cranial nerves 2-12 are intact.  She does have some mild dysarthria.  She has a left hemiparesis that is  moderate 4/5 strength relative to her.  Regular rate and rhythm no rubs gallops murmurs clear to auscultation bilaterally soft mildly distended abdomen the lower portion.  Minimally tympanic.  No real rebound or guarding.  No clubbing cyanosis or edema bilateral lower extremity.    SCHEDULED MEDS:   amLODIPine  5 mg Oral Daily    atorvastatin  40 mg Oral Daily    bisacodyL  10 mg Rectal Daily    dexAMETHasone  4 mg Intravenous Once    docusate  50 mg Oral BID    gabapentin  300 mg Oral BID    mupirocin   Nasal BID    pantoprazole  40 mg Oral Daily    polyethylene glycol  17 g Oral Daily    sodium chloride 0.9%  10 mL Intravenous Q6H       CONTINUOUS INFUSIONS:   sodium chloride (23.4%) HYPERTONIC 4 mEq/mL 172 mEq in sterile water 500 mL (sodium chloride 2%) infusion 35 mL/hr at 01/22/24 1501       PRN MEDS:acetaminophen, albuterol-ipratropium, bisacodyL, hydrALAZINE, sodium chloride 0.9%, Flushing PICC/Midline Protocol **AND** sodium chloride 0.9% **AND** sodium chloride 0.9%, traMADoL    LABS AND DIAGNOSTICS     CBC LAST 3 DAYS  Recent Labs   Lab 01/20/24  0639 01/21/24  0630 01/22/24  0739   WBC 5.43 6.79 5.96   RBC 3.23* 3.49* 3.49*   HGB 10.3* 11.0* 11.0*   HCT 30.6* 32.6* 32.2*   MCV 94.7* 93.4 92.3   MCH 31.9* 31.5* 31.5*   MCHC 33.7 33.7 34.2   RDW 13.4 13.1 12.6    194 189   MPV 9.2 9.3 9.5       COAGULATION LAST 3 DAYS  Recent Labs   Lab 01/18/24  1350   INR 1.0       CHEMISTRY LAST 3 DAYS  Recent Labs   Lab 01/18/24  1350 01/19/24  0502 01/19/24  1148 01/20/24  0639 01/20/24  0809 01/20/24  1744 01/21/24  0755 01/21/24  1910 01/22/24  0004 01/22/24  0739 01/22/24  1158 01/22/24  1750   * 129*   < > 134   < > 132 131* 129*   < > 130* 130* 128*   K 4.1 3.9   < > 3.5   < > 3.0* 3.0* 3.7  --   --   --   --    CO2 26 26   < > 22*   < > 21* 22* 19*  --   --   --   --    BUN 14.0 12.0   < > 15.0   < > 14.0 9.0* 14.0  --   --   --   --    CREATININE 0.57 0.58   < > 0.55   < > 0.56 0.55 0.55  --   --    "--   --    CALCIUM 8.9 8.7   < > 8.5   < > 8.4 8.3* 8.3*  --   --   --   --    ALBUMIN 3.3* 3.2*  --  2.9*  --   --   --   --   --   --   --   --    ALKPHOS 96 81  --  70  --   --   --   --   --   --   --   --    ALT 49 44  --  40  --   --   --   --   --   --   --   --    AST 44* 40*  --  39*  --   --   --   --   --   --   --   --    BILITOT 0.4 0.7  --  0.6  --   --   --   --   --   --   --   --     < > = values in this interval not displayed.       CARDIAC PROFILE LAST 3 DAYS  Recent Labs   Lab 01/18/24  1349 01/19/24  0502   CPK  --  64   LDH  --  256*   TROPONINI 0.026  --        ENDOCRINE LAST 3 DAYS  Recent Labs   Lab 01/18/24  1350   TSH 0.669       LAST ARTERIAL BLOOD GAS  ABG  No results for input(s): "PH", "PO2", "PCO2", "HCO3", "BE" in the last 168 hours.    LAST 7 DAYS MICROBIOLOGY   Microbiology Results (last 7 days)       ** No results found for the last 168 hours. **            MOST RECENT IMAGING  CT Cervical Spine Without Contrast  Narrative: CT SCAN OF CERVICAL SPINE WITH 3-D POST PROCESSING:    CPT: 73051, 08529    Total DLP: 1197 mGy-cm  Automatic exposure control was utilized to limit the radiation dose to the patient.    HISTORY:  Neck pain after trauma.    Comparison: 01/18/2024.    TECHNIQUE: Multiple thin cut axial images were acquired through the cervical spine without contrast administration. 3D postprocessing and multiplanar reformatting was performed on the source images at the workstation by the radiologist.    FINDINGS:  Artifact from repeated patient motion significantly limits multiple images on this exam.  The osseous structures are diffusely demineralized.  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  There is a similar grade 1 retrolisthesis at C5-C6.  By the axial images, there is mild rotary subluxation at C1-C2, but by the coronal images the lateral masses are in alignment.  No acute fractures, subluxations, jumped or perched facets, prevertebral soft tissue swelling, or " radiopaque foreign bodies are present.  Evaluation of the margins of the soft tissue components of the spinal canal is limited due to artifact.  Significant canal stenosis and significant disc bulges, protrusions, herniations/extrusions or canal stenoses cannot be excluded due to the artifact. There are multilevel bilateral foraminal stenoses.  The thyroid gland is heterogeneous common underlying thyroid nodules/lesions cannot be excluded.  There are calcified atherosclerotic changes.  The lung apices are unremarkable except for foramina nodular scrolling scarring.  Impression: 1. No acute fractures or subluxations are identified.    2.  Changes from cervical spondylosis are poorly visualized, as above.    3.  Osteopenia versus osteoporosis.    4.  Similar cervicothoracic levoscoliosis.    Electronically signed by: Genaro Herzog MD  Date:    01/21/2024  Time:    13:25  CT Head Without Contrast  Narrative: CT HEAD WITHOUT CONTRAST:    CPT 44543    Total DLP: 1197 mGy-cm  Automatic exposure control was utilized to limit the radiation dose to the patient.    History: Subdural hemorrhage with parenchymal on subarachnoid hemorrhages also present.    Comparison: 01/19/2024 and, 01/18/2024, and 12/28/2023.    Technique: Multiple contiguous axial images were acquired from the base of the skull the vertex without contrast administration.    Findings:  There is similar appearance of the right cerebral convexity hyperdense subdural hematoma or degree of mass effect with 4.8 mm of right to left shift at the level of the inferior bodies of the lateral ventricles.  There is effacement the right lateral ventricle with similar appearance of the left lateral ventricle, which appeared more dilated on the prior scan from December due to ex vacuo dilatation in central involutional changes.  There is similar appearance of the right frontal intraparenchymal hemorrhage.  There is similar appearance of subarachnoid hemorrhage layering  along multiple right parietal and posterior temporal occipital sulci.  Small intraparenchymal contusions in the right lateral occipital and posterior temporal lobes are similar in that extends into the area of previously seen encephalomalacia and gliosis from chronic right MCA distribution infarction causing some increased density.  There is an unchanged chronic lacunar infarct at the anterior edge of the anterior limb of the left internal capsule.  There is scattered asymmetric low density without obvious mass-effect throughout the bilateral supratentorial white matter. The gray-white junctions are maintained. No other cerebral or cerebellar parenchymal abnormality is identified.  There are calcifications of the distal internal carotid and vertebrobasilar arteries. The partially visualized paranasal sinuses and mastoid air cells are clear. The calvarium is intact.  No fractures are identified in the visualized osseous structures.  Impression: Impression:  1.  Similar appearance of right cerebral convexity subdural hematoma with similar degree of right to left midline shift that is 4.8 mm at the level inferior bodies of the lateral ventricles.  2.  Similar appearance of intraparenchymal contusions in right frontal the, axial, and temporal lobes.    3.  Similar appearance of small amounts of subarachnoid hemorrhage in sulci on the right.  4.  Similar appearance of white matter microvascular ischemic changes.    Electronically signed by: Genaro Herzog MD  Date:    01/21/2024  Time:    12:32      [facility]  Results for orders placed during the hospital encounter of 12/26/23    Echo    Interpretation Summary    Right Ventricle: Right ventricle was not well visualized due to poor acoustic window. Normal right ventricular cavity size. Systolic function is normal.    Left Atrium: Left atrium is mildly dilated.    Right Atrium: Right atrium is mildly dilated.    Aortic Valve: Moderately calcified cusps. There is moderate  stenosis. Aortic valve peak velocity is 2.85 m/s. There is moderate to severe aortic regurgitation. AR PHTN 274 msec.    If clinically indicated a ALMAS can be helpful to further assess the severity of AV disease.    Left Ventricle: The left ventricle is normal in size. There is mild concentric hypertrophy. There is normal systolic function with a visually estimated ejection fraction of 55 - 60%.    Mitral Valve: Moderately thickened leaflets. Moderately calcified leaflets. There is mild stenosis. The mean pressure gradient across the mitral valve is 6 mmHg at a heart rate of  bpm. There is mild to moderate regurgitation.    Tricuspid Valve: There is no stenosis. There is mild regurgitation.    Pulmonic Valve: There is no stenosis.    Pleural effusion is present which can be better assessed by dedicated chest imaging.      CURRENT/PREVIOUS VISIT EKG  Results for orders placed or performed during the hospital encounter of 12/26/23   EKG 12-lead    Collection Time: 12/27/23 12:10 PM    Narrative    Test Reason : I69.952,    Vent. Rate : 076 BPM     Atrial Rate : 076 BPM     P-R Int : 164 ms          QRS Dur : 108 ms      QT Int : 406 ms       P-R-T Axes : 057 -74 084 degrees     QTc Int : 456 ms    Normal sinus rhythm  Left axis deviation  IVCD in LBBB morphology  Non-specific ST-T wave abnormalities  Abnormal ECG  No previous ECGs available  Confirmed by Ronald Weldon MD ([id number]) on 12/27/2023 3:30:29 PM    Referred By: [facility]   SELF           Confirmed By:Ronald Weldon MD       ASSESSMENT/PLAN:     Active Hospital Problems    Diagnosis    *SDH (subdural hematoma)    Cervical pain (neck)     C-spine x-ray , pt consulted  Start tramadol prn      Scalp laceration    COVID-19     Will get some markers in the morning.  Patient vaccination status unclear.  Will check with my lab in the morning office.  Will get COVID markers in the morning.  Patient was not requiring oxygen at this point.  To low risk because of age.       Lower extremity pain, anterior, unspecified laterality    Hyponatremia       ASSESSMENT & PLAN:   Patient off Plavix.  Subdural seems to be steady.  No additional issues.  However she has had some left-sided hemiparesis watch.    P.r.n. tramadol for x-ray C-spine film.  Likely due to trauma fall.      Scalp laceration stable.      Code COVID-19 progressing without any significant compromise.    Patient is still having hyponatremia.  I am going to stop her gabapentin which could be a contribution this continue hot salt.  Patient off of SSRI this point as well.      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  Will monitor her sodium levels and will continue to try to get her sodium levels up to% saline.    Appreciate Francine with case management.  Her research with regard to sitters at [facility] when is noted.        Tomer Samaniego III, MD  Department of Hospital Medicine ([facility])   Date of Service: 01/22/2024  10:09 PM

## 2024-01-23 NOTE — PHYSICIAN QUERY
PT Name: Bhavani Galarza  MR #: [id number]     DOCUMENTATION CLARIFICATION     CDS/:  Benoit Villanueva RN, CDS                   Contact Information:  [email or web address]    This form is a permanent document in the medical record.    Query Date: January 23, 2024    By submitting this query, we are merely seeking further clarification of documentation.  Please utilize your independent clinical judgment when addressing the question(s) below.    The Medical Record contains the following:  Indicators Supporting Clinical Findings Location in Medical Record   X Decreased LOC, neurological deficits She is able to get up and move to the commode with maximal assist.  She does have some communication issues and she does look to have a little bit of disorientation  H&P 1/18    [place] Coma Scale (GCS)     X Traumatic Injury 89-year-old white female with a recent MCA ischemic infarct from 3 weeks ago has a fall reported and evaluated in the ER with 3 subdural right-sided small bleeds.    H&P 1/18   X Acute/Chronic Conditions SDH  Traumatic intracerebral hemorrhage with unknown loss of consciousness status, unspecified laterality, initial encounter  Laceration of scalp, initial encounter  COVID-19  Hyponatremia      The hyponatremia is due to presumed dehydration versus SIADH versus COVID.    ED MD 1/18        HM PN 1/21   X Radiology Interim changes in configuration of previous right subdural hematoma with decreased lobulation and increased opacification of the right cerebral convexity measuring up to 1.5 cm in thickness.  There is overall increase in density compatible with the increase in acute hemorrhagic components.    There is interim development of diffuse mass effect/diffuse sulcal effacement along the right cerebral convexity and slight midline shift to the left estimated at 4-5 mm.    CT 1/19   X Treatment (i.e. IV Steroids, Mannitol, Surgery) sodium chloride (23.4%) HYPERTONIC 4 mEq/mL 172 mEq in  sterile water 500 mL (sodium chloride 2%) infusion IV continuous  MAR    X Other Today she had an unwitnessed fall they found her down on the ground with bleeding from the left side occiput area of her head.  She ons anti platelet drugs Plavix  ED MD 1/18       Provider, please specify diagnosis or diagnoses associated with above clinical findings. Select all that apply:    [ x  ] Traumatic Brain Compression      [   x] Traumatic Cerebral Edema      [ x  ] Other neurological diagnosis (please specify):__ metabolic encephalopathy secondary to hyponatremia ______         Please document in your progress notes daily for the duration of treatment until resolved and include in your discharge summary.    Form No. [id number]

## 2024-01-23 NOTE — PHYSICIAN QUERY
PT Name: Bhavani Galarza  MR #: [id number]    DOCUMENTATION CLARIFICATION     CDS/:  Benoit Villanueva RN, CDS                   Contact Information:  [email or web address]  This form is a permanent document in the medical record.     Query Date: January 23, 2024    By submitting this query, we are merely seeking further clarification of documentation. Please utilize your independent clinical judgment when addressing the question(s) below.    Supporting Clinical Findings Location in Medical Record   Today she had an unwitnessed fall they found her down on the ground with bleeding from the left side occiput area of her head.      She ons anti platelet drugs Plavix     SDH  Traumatic intracerebral hemorrhage with unknown loss of consciousness status, unspecified laterality, initial encounter   Laceration of scalp      Patient underwent CT late this afternoon which shows more confluence of bleeding of the brain on the right hemisphere the patient clinically doing better today.  Her last dose Plavix was yesterday    ED MD 1/18                      HM PN 1/19   Impression:  1. Interim development of a lobulated sub dural and suspect epidural hematomas along the right parietal and posterior convexity containing acute and nonacute the components measuring up to 1.5 cm in thickness  2. Interim development of scattered small parenchymal hemorrhages at the posterior right parietal lobe and right occipital lobe as well as a small bleed at the anterior right frontal lobe  3. Parenchymal defect/old infarct/encephalomalacia in the distribution of the right middle cerebral artery  4. Generalized cerebral and cerebellar atrophy  5. Intracranial atherosclerosis  6. Scattered hypodensities at the periventricular white matter suspicious for small vessel ischemic changes  7. Interim development of a scalp normal and soft tissue emphysema overlying the left occipital    Impression:  1. Interim changes in configuration of  previous right subdural hematoma with decreased lobulation and increased opacification of the right cerebral convexity measuring up to 1.5 cm in thickness.  There is overall increase in density compatible with the increase in acute hemorrhagic components.  There is interim development of diffuse mass effect/diffuse sulcal effacement along the right cerebral convexity and slight midline shift to the left estimated at 4-5 mm.  2. Interim increase in size of small parenchymal hemorrhage at the anterior right frontal lobe since the prior exam measuring up to 2.5 cm  3. Small parenchymal hemorrhages at the posterior right parietal lobe are similar in size   CT 1/18                                CT 1/19       Provider, please clarify if there is any clinical correlation between Plavix and SDH/Taumatic ICH.           Are the conditions:      [ X ] Due to or associated with each other   [  ] Unrelated to each other   [  ] Other explanation (Please Specify): ______________   [  ] Clinically Undetermined                                                                               Please document in your progress notes daily for the duration of treatment until resolved and include in your discharge summary.

## 2024-01-24 LAB
ALBUMIN SERPL-MCNC: 2.5 G/DL (ref 3.4–4.8)
ALBUMIN/GLOB SERPL: 1 RATIO (ref 1.1–2)
ALP SERPL-CCNC: 67 UNIT/L (ref 40–150)
ALT SERPL-CCNC: 30 UNIT/L (ref 0–55)
AST SERPL-CCNC: 34 UNIT/L (ref 5–34)
BASOPHILS # BLD AUTO: 0.03 X10(3)/MCL
BASOPHILS NFR BLD AUTO: 0.3 %
BILIRUB SERPL-MCNC: 0.7 MG/DL
BUN SERPL-MCNC: 19 MG/DL (ref 9.8–20.1)
CALCIUM SERPL-MCNC: 8 MG/DL (ref 8.4–10.2)
CHLORIDE SERPL-SCNC: 103 MMOL/L (ref 98–111)
CK SERPL-CCNC: 118 U/L (ref 29–168)
CO2 SERPL-SCNC: 23 MMOL/L (ref 23–31)
CREAT SERPL-MCNC: 0.54 MG/DL (ref 0.55–1.02)
EOSINOPHIL # BLD AUTO: 0.02 X10(3)/MCL (ref 0–0.9)
EOSINOPHIL NFR BLD AUTO: 0.2 %
ERYTHROCYTE [DISTWIDTH] IN BLOOD BY AUTOMATED COUNT: 12.7 % (ref 11.5–17)
FERRITIN SERPL-MCNC: 296.82 NG/ML (ref 4.63–204)
GFR SERPLBLD CREATININE-BSD FMLA CKD-EPI: >60 MLS/MIN/1.73/M2
GLOBULIN SER-MCNC: 2.6 GM/DL (ref 2.4–3.5)
GLUCOSE SERPL-MCNC: 116 MG/DL (ref 75–121)
HCT VFR BLD AUTO: 28.4 % (ref 37–47)
HGB BLD-MCNC: 9.9 G/DL (ref 12–16)
IMM GRANULOCYTES # BLD AUTO: 0.02 X10(3)/MCL (ref 0–0.04)
IMM GRANULOCYTES NFR BLD AUTO: 0.2 %
LDH SERPL-CCNC: 226 U/L (ref 125–220)
LYMPHOCYTES # BLD AUTO: 0.78 X10(3)/MCL (ref 0.6–4.6)
LYMPHOCYTES NFR BLD AUTO: 7.4 %
MCH RBC QN AUTO: 31.6 PG (ref 27–31)
MCHC RBC AUTO-ENTMCNC: 34.9 G/DL (ref 33–36)
MCV RBC AUTO: 90.7 FL (ref 80–94)
MONOCYTES # BLD AUTO: 0.44 X10(3)/MCL (ref 0.1–1.3)
MONOCYTES NFR BLD AUTO: 4.2 %
NEUTROPHILS # BLD AUTO: 9.26 X10(3)/MCL (ref 2.1–9.2)
NEUTROPHILS NFR BLD AUTO: 87.7 %
PLATELET # BLD AUTO: 210 X10(3)/MCL (ref 130–400)
PMV BLD AUTO: 9.1 FL (ref 7.4–10.4)
POTASSIUM SERPL-SCNC: 2.6 MMOL/L (ref 3.5–5.1)
PROT SERPL-MCNC: 5.1 GM/DL (ref 5.8–7.6)
RBC # BLD AUTO: 3.13 X10(6)/MCL (ref 4.2–5.4)
SODIUM SERPL-SCNC: 132 MMOL/L (ref 132–146)
SODIUM SERPL-SCNC: 137 MMOL/L (ref 132–146)
TROPONIN I SERPL-MCNC: 0.87 NG/ML (ref 0–0.04)
WBC # SPEC AUTO: 10.55 X10(3)/MCL (ref 4.5–11.5)

## 2024-01-24 PROCEDURE — 27000207 HC ISOLATION

## 2024-01-24 PROCEDURE — 92526 ORAL FUNCTION THERAPY: CPT

## 2024-01-24 PROCEDURE — 84295 ASSAY OF SERUM SODIUM: CPT | Performed by: INTERNAL MEDICINE

## 2024-01-24 PROCEDURE — 82728 ASSAY OF FERRITIN: CPT | Performed by: INTERNAL MEDICINE

## 2024-01-24 PROCEDURE — 21400001 HC TELEMETRY ROOM

## 2024-01-24 PROCEDURE — 94761 N-INVAS EAR/PLS OXIMETRY MLT: CPT

## 2024-01-24 PROCEDURE — 63600175 PHARM REV CODE 636 W HCPCS: Performed by: INTERNAL MEDICINE

## 2024-01-24 PROCEDURE — 99900035 HC TECH TIME PER 15 MIN (STAT)

## 2024-01-24 PROCEDURE — 25000003 PHARM REV CODE 250: Performed by: FAMILY MEDICINE

## 2024-01-24 PROCEDURE — 83615 LACTATE (LD) (LDH) ENZYME: CPT | Performed by: INTERNAL MEDICINE

## 2024-01-24 PROCEDURE — A4216 STERILE WATER/SALINE, 10 ML: HCPCS | Performed by: INTERNAL MEDICINE

## 2024-01-24 PROCEDURE — 82550 ASSAY OF CK (CPK): CPT | Performed by: INTERNAL MEDICINE

## 2024-01-24 PROCEDURE — 80053 COMPREHEN METABOLIC PANEL: CPT | Performed by: INTERNAL MEDICINE

## 2024-01-24 PROCEDURE — 84484 ASSAY OF TROPONIN QUANT: CPT | Performed by: INTERNAL MEDICINE

## 2024-01-24 PROCEDURE — 97530 THERAPEUTIC ACTIVITIES: CPT

## 2024-01-24 PROCEDURE — 85025 COMPLETE CBC W/AUTO DIFF WBC: CPT | Performed by: INTERNAL MEDICINE

## 2024-01-24 PROCEDURE — 25000003 PHARM REV CODE 250: Performed by: INTERNAL MEDICINE

## 2024-01-24 RX ORDER — 3% SODIUM CHLORIDE 3 G/100ML
25 INJECTION, SOLUTION INTRAVENOUS CONTINUOUS
Status: DISCONTINUED | OUTPATIENT
Start: 2024-01-24 | End: 2024-01-29

## 2024-01-24 RX ORDER — POTASSIUM CHLORIDE 14.9 MG/ML
20 INJECTION INTRAVENOUS
Status: COMPLETED | OUTPATIENT
Start: 2024-01-24 | End: 2024-01-24

## 2024-01-24 RX ADMIN — ACETAMINOPHEN 650 MG: 325 TABLET, FILM COATED ORAL at 10:01

## 2024-01-24 RX ADMIN — ATORVASTATIN CALCIUM 40 MG: 40 TABLET, FILM COATED ORAL at 08:01

## 2024-01-24 RX ADMIN — POTASSIUM CHLORIDE 20 MEQ: 14.9 INJECTION, SOLUTION INTRAVENOUS at 10:01

## 2024-01-24 RX ADMIN — GABAPENTIN 300 MG: 300 CAPSULE ORAL at 08:01

## 2024-01-24 RX ADMIN — SODIUM CHLORIDE, PRESERVATIVE FREE 10 ML: 5 INJECTION INTRAVENOUS at 09:01

## 2024-01-24 RX ADMIN — SODIUM CHLORIDE, PRESERVATIVE FREE 10 ML: 5 INJECTION INTRAVENOUS at 06:01

## 2024-01-24 RX ADMIN — CEFTRIAXONE SODIUM 1 G: 1 INJECTION, POWDER, FOR SOLUTION INTRAMUSCULAR; INTRAVENOUS at 06:01

## 2024-01-24 RX ADMIN — AMLODIPINE BESYLATE 5 MG: 5 TABLET ORAL at 08:01

## 2024-01-24 RX ADMIN — SODIUM CHLORIDE 25 ML/HR: 3 INJECTION, SOLUTION INTRAVENOUS at 06:01

## 2024-01-24 RX ADMIN — POTASSIUM CHLORIDE 20 MEQ: 14.9 INJECTION, SOLUTION INTRAVENOUS at 08:01

## 2024-01-24 NOTE — PLAN OF CARE
Problem: Swallowing Impairment (Stroke, Hemorrhagic)  Goal: Optimal Eating and Swallowing Without Aspiration  Outcome: Ongoing, Progressing     Problem: SLP  Goal: SLP Goal  Description: LTG:  -Pt will maintain adequate hydration/nutrition with optimum safety and efficiency of swallowing function on P.O. intake without overt signs and symptoms of aspiration for the highest appropriate diet level.    -Pt will utilize compensatory strategies with optimum safety and efficiency of swallowing function on P.O. intake without overt signs and symptoms of aspiration for the highest appropriate diet level.        STG:  -Pt will complete a Modified Barium Swallow Study to fully assess physiology and anatomy of the swallow and to determine the appropriate diet and/or rehabilitation exercises/POC.    -Pt will tolerate diet upgrade trials without signs and/or symptoms of aspiration with to safely least restrictive diet with (min/mod/max) verbal, visual and tactile cues.      -Pt will safely ingest diet trials during therapeutic feedings with the SLP without signs and/or symptoms of aspiration with to safely consume least restrictive diet with (min/mod/max) verbal, visual and tactile cues.    Outcome: Ongoing, Progressing     Problem: SLP  Goal: SLP Goal  Description: LTG:  -Pt will maintain adequate hydration/nutrition with optimum safety and efficiency of swallowing function on P.O. intake without overt signs and symptoms of aspiration for the highest appropriate diet level.    -Pt will utilize compensatory strategies with optimum safety and efficiency of swallowing function on P.O. intake without overt signs and symptoms of aspiration for the highest appropriate diet level.        STG:  -Pt will complete a Modified Barium Swallow Study to fully assess physiology and anatomy of the swallow and to determine the appropriate diet and/or rehabilitation exercises/POC.    -Pt will tolerate diet upgrade trials without signs and/or  symptoms of aspiration with to safely least restrictive diet with (min/mod/max) verbal, visual and tactile cues.      -Pt will safely ingest diet trials during therapeutic feedings with the SLP without signs and/or symptoms of aspiration with to safely consume least restrictive diet with (min/mod/max) verbal, visual and tactile cues.    Outcome: Ongoing, Progressing

## 2024-01-24 NOTE — PLAN OF CARE
Dr. Stone visited and states he's ordered STAT labs and CT Head as pt is unresponsive.  He states he will discuss results once he gets, then can further discuss how to move forward with pt and family.  Called pt's dgter Padmini HAWTHORNE ph# [phone] to update re: change in condition.  She states understanding.  Did leave an update on SW voice mail at [facility].

## 2024-01-24 NOTE — PT/OT/SLP PROGRESS
Physical Therapy Treatment    Patient Name:  Bhavani Galarza   MRN:  [id number]    Recommendations:     Discharge Recommendations:    Discharge Equipment Recommendations:  (to be determined once activity limitations are lifted)  Barriers to discharge: Decreased caregiver support    Assessment:     Bhavani Galarza is a 89 y.o. female admitted with a medical diagnosis of SDH (subdural hematoma).  She presents with the following impairments/functional limitations: pain, weakness  Rehab Prognosis: Fair; patient would benefit from acute skilled PT services to address these deficits and reach maximum level of function.    Recent Surgery: * No surgery found *      Plan:     During this hospitalization, patient to be seen 5 x/week to address the identified rehab impairments via therapeutic exercises and progress toward the following goals:    Plan of Care Expires:       Subjective     Chief Complaint: headache, weakness  Patient/Family Comments/goals: to get up to [facility] today for BM  Pain/Comfort:         Objective:     Communicated with pt and caregiver prior to session.  Patient found HOB elevated with   upon PT entry to room.     General Precautions: Standard,    Orthopedic Precautions:    Braces:    Respiratory Status: Room air     Functional Mobility:  Bed Mobility:     Scooting: maximal assistance  Supine to Sit: maximal assistance  Transfers:     Toilet Transfer: Ax2 helpers with  gt belt  using  Squat Pivot  Amb to chair HHAx2 helpers      AM-PAC 6 CLICK MOBILITY          Treatment & Education:  Pt with profound weakness, left hemiparesis.    Patient left up in chair with chair alarm on, all lines intact, call bell in reach, caregiver with pt.GOALS:   Multidisciplinary Problems       Physical Therapy Goals          Problem: Physical Therapy    Goal Priority Disciplines Outcome Goal Variances Interventions   Physical Therapy Goal     PT, PT/OT Ongoing, Progressing     Description: 1.  Pt to be instructed in safe,  appropriate exercises in bed to maintain BLE AROM and strength while on current activity limitations today 1-19-24.  2.  Pt to be reassessed and MD/Nurse to be consulted prior to further PT on Monday 1-22-24 in regards to activity limitations and medical status.                       Time Tracking:     PT Received On: 01/24/24  PT Start Time: 0900     PT Stop Time: 0915  PT Total Time (min): 15 min     Billable Minutes: Therapeutic Activity 15      Treatment Type: Treatment  PT/PTA: PT           01/24/2024

## 2024-01-24 NOTE — PROGRESS NOTES
[facility]  [address] [place] [address]  Phone: [phone]    (Hospital) Internal Medicine  Progress Note      PATIENT NAME: Bhavani Galarza  MRN: [id number]  TODAY'S DATE: 01/23/2024  ADMIT DATE: 1/18/2024    SUBJECTIVE     PRINCIPLE PROBLEM: SDH (subdural hematoma)    INTERVAL HISTORY:    1/23/2024  Patient continues to show improvement.  Her language and fluency is better.  Sodium now 133 with 2% hyperosmolar saline.  She is conversant.  She is wanting to participate with physical therapy.  She is having good appetite she has had bowel movements.  Her friend and sitter    Review of patient's allergies indicates:  No Known Allergies    ROS generalized weakness.    OBJECTIVE     VITAL SIGNS (Most Recent)  Temp: 97.7 °F (36.5 °C) (01/23/24 1921)  Pulse: 91 (01/23/24 1921)  Resp: 20 (01/23/24 1921)  BP: 135/75 (01/23/24 1921)  SpO2: (!) 92 % (01/23/24 1921)    VENTILATION STATUS  Resp: 20 (01/23/24 1921)  SpO2: (!) 92 % (01/23/24 1921)           I & O (Last 24H):  Intake/Output Summary (Last 24 hours) at 1/23/2024 2033  Last data filed at 1/23/2024 1700  Gross per 24 hour   Intake 1060 ml   Output 1550 ml   Net -490 ml       WEIGHTS  Wt Readings from Last 3 Encounters:   01/21/24 0002 45.3 kg (99 lb 13.9 oz)   01/20/24 0414 41.9 kg (92 lb 6 oz)   01/19/24 0500 42.2 kg (93 lb 0.6 oz)   01/18/24 1535 42.2 kg (93 lb)   01/18/24 1235 42.2 kg (93 lb)   12/26/23 1719 45.4 kg (100 lb)   12/26/23 1050 45.4 kg (100 lb)   10/18/23 1343 40.8 kg (90 lb)       Physical Exam    Patient is alert orient x3.  She recognizes me know left lateral neglect.  She does have some decreased strength in left upper extremity 4/5.  The other 5/5 on right upper extremity bilateral lower extremities.  No JVD.  Regular rate and rhythm no rubs gallops or murmurs clear to auscultation bilaterally soft scaphoid abdomen no clubbing cyanosis or edema bilateral lower extremities.  No heel breakdown  bilaterally.    SCHEDULED MEDS:   amLODIPine  5 mg Oral Daily    atorvastatin  40 mg Oral Daily    bisacodyL  10 mg Rectal Daily    dexAMETHasone  4 mg Intravenous Once    docusate  50 mg Oral BID    gabapentin  300 mg Oral BID    mupirocin   Nasal BID    polyethylene glycol  17 g Oral Daily    sodium chloride 0.9%  10 mL Intravenous Q6H       CONTINUOUS INFUSIONS:   sodium chloride (23.4%) HYPERTONIC 4 mEq/mL 172 mEq in sterile water 500 mL (sodium chloride 2%) infusion 35 mL/hr at 01/23/24 0715       PRN MEDS:acetaminophen, albuterol-ipratropium, bisacodyL, hydrALAZINE, sodium chloride 0.9%, Flushing PICC/Midline Protocol **AND** sodium chloride 0.9% **AND** sodium chloride 0.9%    LABS AND DIAGNOSTICS     CBC LAST 3 DAYS  Recent Labs   Lab 01/20/24  0639 01/21/24  0630 01/22/24  0739   WBC 5.43 6.79 5.96   RBC 3.23* 3.49* 3.49*   HGB 10.3* 11.0* 11.0*   HCT 30.6* 32.6* 32.2*   MCV 94.7* 93.4 92.3   MCH 31.9* 31.5* 31.5*   MCHC 33.7 33.7 34.2   RDW 13.4 13.1 12.6    194 189   MPV 9.2 9.3 9.5       COAGULATION LAST 3 DAYS  Recent Labs   Lab 01/18/24  1350   INR 1.0       CHEMISTRY LAST 3 DAYS  Recent Labs   Lab 01/18/24  1350 01/19/24  0502 01/19/24  1148 01/20/24  0639 01/20/24  0809 01/20/24  1744 01/21/24  0755 01/21/24  1910 01/22/24  0004 01/23/24  0554 01/23/24  1143 01/23/24  1804   * 129*   < > 134   < > 132 131* 129*   < > 131* 133 132   K 4.1 3.9   < > 3.5   < > 3.0* 3.0* 3.7  --   --   --   --    CO2 26 26   < > 22*   < > 21* 22* 19*  --   --   --   --    BUN 14.0 12.0   < > 15.0   < > 14.0 9.0* 14.0  --   --   --   --    CREATININE 0.57 0.58   < > 0.55   < > 0.56 0.55 0.55  --   --   --   --    CALCIUM 8.9 8.7   < > 8.5   < > 8.4 8.3* 8.3*  --   --   --   --    ALBUMIN 3.3* 3.2*  --  2.9*  --   --   --   --   --   --   --   --    ALKPHOS 96 81  --  70  --   --   --   --   --   --   --   --    ALT 49 44  --  40  --   --   --   --   --   --   --   --    AST 44* 40*  --  39*  --   --   --   --  "  --   --   --   --    BILITOT 0.4 0.7  --  0.6  --   --   --   --   --   --   --   --     < > = values in this interval not displayed.       CARDIAC PROFILE LAST 3 DAYS  Recent Labs   Lab 01/18/24  1349 01/19/24  0502   CPK  --  64   LDH  --  256*   TROPONINI 0.026  --        ENDOCRINE LAST 3 DAYS  Recent Labs   Lab 01/18/24  1350   TSH 0.669       LAST ARTERIAL BLOOD GAS  ABG  No results for input(s): "PH", "PO2", "PCO2", "HCO3", "BE" in the last 168 hours.    LAST 7 DAYS MICROBIOLOGY   Microbiology Results (last 7 days)       ** No results found for the last 168 hours. **            MOST RECENT IMAGING  CT Cervical Spine Without Contrast  Narrative: CT SCAN OF CERVICAL SPINE WITH 3-D POST PROCESSING:    CPT: 62852, 37531    Total DLP: 1197 mGy-cm  Automatic exposure control was utilized to limit the radiation dose to the patient.    HISTORY:  Neck pain after trauma.    Comparison: 01/18/2024.    TECHNIQUE: Multiple thin cut axial images were acquired through the cervical spine without contrast administration. 3D postprocessing and multiplanar reformatting was performed on the source images at the workstation by the radiologist.    FINDINGS:  Artifact from repeated patient motion significantly limits multiple images on this exam.  The osseous structures are diffusely demineralized.  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  There is a similar grade 1 retrolisthesis at C5-C6.  By the axial images, there is mild rotary subluxation at C1-C2, but by the coronal images the lateral masses are in alignment.  No acute fractures, subluxations, jumped or perched facets, prevertebral soft tissue swelling, or radiopaque foreign bodies are present.  Evaluation of the margins of the soft tissue components of the spinal canal is limited due to artifact.  Significant canal stenosis and significant disc bulges, protrusions, herniations/extrusions or canal stenoses cannot be excluded due to the artifact. There are multilevel " bilateral foraminal stenoses.  The thyroid gland is heterogeneous common underlying thyroid nodules/lesions cannot be excluded.  There are calcified atherosclerotic changes.  The lung apices are unremarkable except for foramina nodular scrolling scarring.  Impression: 1. No acute fractures or subluxations are identified.    2.  Changes from cervical spondylosis are poorly visualized, as above.    3.  Osteopenia versus osteoporosis.    4.  Similar cervicothoracic levoscoliosis.    Electronically signed by: Genaro Herzog MD  Date:    01/21/2024  Time:    13:25  CT Head Without Contrast  Narrative: CT HEAD WITHOUT CONTRAST:    CPT 41013    Total DLP: 1197 mGy-cm  Automatic exposure control was utilized to limit the radiation dose to the patient.    History: Subdural hemorrhage with parenchymal on subarachnoid hemorrhages also present.    Comparison: 01/19/2024 and, 01/18/2024, and 12/28/2023.    Technique: Multiple contiguous axial images were acquired from the base of the skull the vertex without contrast administration.    Findings:  There is similar appearance of the right cerebral convexity hyperdense subdural hematoma or degree of mass effect with 4.8 mm of right to left shift at the level of the inferior bodies of the lateral ventricles.  There is effacement the right lateral ventricle with similar appearance of the left lateral ventricle, which appeared more dilated on the prior scan from December due to ex vacuo dilatation in central involutional changes.  There is similar appearance of the right frontal intraparenchymal hemorrhage.  There is similar appearance of subarachnoid hemorrhage layering along multiple right parietal and posterior temporal occipital sulci.  Small intraparenchymal contusions in the right lateral occipital and posterior temporal lobes are similar in that extends into the area of previously seen encephalomalacia and gliosis from chronic right MCA distribution infarction causing some  increased density.  There is an unchanged chronic lacunar infarct at the anterior edge of the anterior limb of the left internal capsule.  There is scattered asymmetric low density without obvious mass-effect throughout the bilateral supratentorial white matter. The gray-white junctions are maintained. No other cerebral or cerebellar parenchymal abnormality is identified.  There are calcifications of the distal internal carotid and vertebrobasilar arteries. The partially visualized paranasal sinuses and mastoid air cells are clear. The calvarium is intact.  No fractures are identified in the visualized osseous structures.  Impression: Impression:  1.  Similar appearance of right cerebral convexity subdural hematoma with similar degree of right to left midline shift that is 4.8 mm at the level inferior bodies of the lateral ventricles.  2.  Similar appearance of intraparenchymal contusions in right frontal the, axial, and temporal lobes.    3.  Similar appearance of small amounts of subarachnoid hemorrhage in sulci on the right.  4.  Similar appearance of white matter microvascular ischemic changes.    Electronically signed by: Genaro Herzog MD  Date:    01/21/2024  Time:    12:32      [facility]  Results for orders placed during the hospital encounter of 12/26/23    Echo    Interpretation Summary    Right Ventricle: Right ventricle was not well visualized due to poor acoustic window. Normal right ventricular cavity size. Systolic function is normal.    Left Atrium: Left atrium is mildly dilated.    Right Atrium: Right atrium is mildly dilated.    Aortic Valve: Moderately calcified cusps. There is moderate stenosis. Aortic valve peak velocity is 2.85 m/s. There is moderate to severe aortic regurgitation. AR PHTN 274 msec.    If clinically indicated a ALMAS can be helpful to further assess the severity of AV disease.    Left Ventricle: The left ventricle is normal in size. There is mild concentric hypertrophy. There is  normal systolic function with a visually estimated ejection fraction of 55 - 60%.    Mitral Valve: Moderately thickened leaflets. Moderately calcified leaflets. There is mild stenosis. The mean pressure gradient across the mitral valve is 6 mmHg at a heart rate of  bpm. There is mild to moderate regurgitation.    Tricuspid Valve: There is no stenosis. There is mild regurgitation.    Pulmonic Valve: There is no stenosis.    Pleural effusion is present which can be better assessed by dedicated chest imaging.      CURRENT/PREVIOUS VISIT EKG  Results for orders placed or performed during the hospital encounter of 12/26/23   EKG 12-lead    Collection Time: 12/27/23 12:10 PM    Narrative    Test Reason : I69.952,    Vent. Rate : 076 BPM     Atrial Rate : 076 BPM     P-R Int : 164 ms          QRS Dur : 108 ms      QT Int : 406 ms       P-R-T Axes : 057 -74 084 degrees     QTc Int : 456 ms    Normal sinus rhythm  Left axis deviation  IVCD in LBBB morphology  Non-specific ST-T wave abnormalities  Abnormal ECG  No previous ECGs available  Confirmed by Ronald Weldon MD ([id number]) on 12/27/2023 3:30:29 PM    Referred By: BABS   SELF           Confirmed By:Ronald Weldon MD       ASSESSMENT/PLAN:     Active Hospital Problems    Diagnosis    *SDH (subdural hematoma)    Cervical pain (neck)     C-spine x-ray , pt consulted  Start tramadol prn      Scalp laceration    COVID-19     Will get some markers in the morning.  Patient vaccination status unclear.  Will check with my lab in the morning office.  Will get COVID markers in the morning.  Patient was not requiring oxygen at this point.  To low risk because of age.      Lower extremity pain, anterior, unspecified laterality    Hyponatremia       ASSESSMENT & PLAN:   Subdural hematoma seems to be stabilized.  Will maintain off Plavix because of contraindication.    She is having some cervical pain.  We will hold off any NSAIDs because of her previous subdural, will have Tylenol.   Avoiding pain medicines to avoid constipation.  She seems to be doing well otherwise.  This is likely from the trauma for fall.  Scalp laceration case stable.      For her COVID-19 she seems to be doing quite well.  No additional issues.  Just continue follow her clinically.  O2 sats and respiratory status were normal.  No GI issues.      Continue physical therapy.      Continue monitoring her sodium levels.  I will increase it to 3% saline to try to get her sodium Osmo stat levels up.  I am holding per PPIs and SSRIs that she is having last 2 weeks to avoid any issues that could be pushing her sodium levels down.    Plan will be to continue to get her sodium levels up.  Also consider hydrocortisone if she showing any signs of cortisol deficiency.  I will check another cortisol level in the morning.      RECOMMENDATIONS:  Plan will be to recommend physical therapy with 24 hour sitters and I confirmed with the nursing home with GI in [place].  Likely discharge Friday when sodium levels are improved.    I discussed the plan with the patient and was felt and I will convey that to the daughter Noreen in the morning        Tomer Samaniego III, MD  Department of Hospital Medicine ([facility])   Date of Service: 01/23/2024  8:33 PM

## 2024-01-24 NOTE — PROGRESS NOTES
[facility]  [address] [address]  Phone: [phone]    (Hospital) Internal Medicine  Progress Note      PATIENT NAME: Bhavani Galarza  MRN: [id number]  TODAY'S DATE: 01/24/2024  ADMIT DATE: 1/18/2024    SUBJECTIVE     PRINCIPLE PROBLEM: SDH (subdural hematoma)    INTERVAL HISTORY:    1/24/2024  Came around this afternoon.  Significant blood this morning precluded morning rounds locally because of the weather.  I approached the nurse Nelda around 2:00 a.m. this afternoon at which point she told me the patient was not as responsive today.  The daughter-in-law nor concurred and specifically said around 10 30 this morning she had rather abrupt change.  She ate breakfast she wanted to put on her makeup because someone was coming to look out of her hearing aids.  She was doing quite well yesterday.    Sodium levels were correcting appropriately with 3% hot salt.    Stat CT done shows no change to the head.  Stat chest x-ray shows no acute change.  Stat KUB shows mild fecal impaction.     Review of patient's allergies indicates:  No Known Allergies    ROS decreased consciousness, mild tachycardia, pallor,    OBJECTIVE     VITAL SIGNS (Most Recent)  Temp: 99.4 °F (37.4 °C) (01/24/24 1200)  Pulse: 104 (01/24/24 1200)  Resp: 20 (01/24/24 1200)  BP: 108/67 (01/24/24 1200)  SpO2: (!) 85 % (01/24/24 1200)    VENTILATION STATUS  Resp: 20 (01/24/24 1200)  SpO2: (!) 85 % (01/24/24 1200)           I & O (Last 24H):  Intake/Output Summary (Last 24 hours) at 1/24/2024 1743  Last data filed at 1/24/2024 1300  Gross per 24 hour   Intake 620 ml   Output 801 ml   Net -181 ml       WEIGHTS  Wt Readings from Last 3 Encounters:   01/24/24 0652 (P) 45.8 kg (100 lb 15.5 oz)   01/21/24 0002 45.3 kg (99 lb 13.9 oz)   01/20/24 0414 41.9 kg (92 lb 6 oz)   01/19/24 0500 42.2 kg (93 lb 0.6 oz)   01/18/24 1535 42.2 kg (93 lb)   01/18/24 1235 42.2 kg (93 lb)   12/26/23 1719 45.4 kg (100 lb)   12/26/23  1050 45.4 kg (100 lb)   10/18/23 1343 40.8 kg (90 lb)       Physical Exam    I evaluated the patient twice once a around 2:00 a.m. and then at about 530 with the family at the bedside.  Patient cranial nerves are not assessed because she is minimally responsive at 1:30 a.m. this afternoon she did open her eyes and answer simple questions.  This is at 5:30 a.m..  The patient looks pale.  She is mildly tachycardic around 100.  Breath sounds are clear to auscultation bilaterally abdomen is soft mildly distended in the suprapubic region.  No clubbing cyanosis or edema bilateral lower extremities.    Her daughter Noreen is present with the friend Mr. Medina.    SCHEDULED MEDS:   amLODIPine  5 mg Oral Daily    atorvastatin  40 mg Oral Daily    bisacodyL  10 mg Rectal Daily    cefTRIAXone (Rocephin) IV (PEDS and ADULTS)  1 g Intravenous Q12H    dexAMETHasone  4 mg Intravenous Once    docusate  50 mg Oral BID    gabapentin  300 mg Oral BID    polyethylene glycol  17 g Oral Daily    sodium chloride 0.9%  10 mL Intravenous Q6H       CONTINUOUS INFUSIONS:   sodium chloride 3% HYPERTONIC         PRN MEDS:acetaminophen, albuterol-ipratropium, bisacodyL, hydrALAZINE, sodium chloride 0.9%, Flushing PICC/Midline Protocol **AND** sodium chloride 0.9% **AND** sodium chloride 0.9%    LABS AND DIAGNOSTICS     CBC LAST 3 DAYS  Recent Labs   Lab 01/21/24  0630 01/22/24  0739 01/24/24  1616   WBC 6.79 5.96 10.55   RBC 3.49* 3.49* 3.13*   HGB 11.0* 11.0* 9.9*   HCT 32.6* 32.2* 28.4*   MCV 93.4 92.3 90.7   MCH 31.5* 31.5* 31.6*   MCHC 33.7 34.2 34.9   RDW 13.1 12.6 12.7    189 210   MPV 9.3 9.5 9.1       COAGULATION LAST 3 DAYS  Recent Labs   Lab 01/18/24  1350   INR 1.0       CHEMISTRY LAST 3 DAYS  Recent Labs   Lab 01/19/24  0502 01/19/24  1148 01/20/24  0639 01/20/24  0809 01/21/24  0755 01/21/24  1910 01/22/24  0004 01/23/24  1804 01/24/24  0746 01/24/24  1616   *   < > 134   < > 131* 129*   < > 132 137 132   K 3.9   < >  "3.5   < > 3.0* 3.7  --   --   --  2.6*   CO2 26   < > 22*   < > 22* 19*  --   --   --  23   BUN 12.0   < > 15.0   < > 9.0* 14.0  --   --   --  19.0   CREATININE 0.58   < > 0.55   < > 0.55 0.55  --   --   --  0.54*   CALCIUM 8.7   < > 8.5   < > 8.3* 8.3*  --   --   --  8.0*   ALBUMIN 3.2*  --  2.9*  --   --   --   --   --   --  2.5*   ALKPHOS 81  --  70  --   --   --   --   --   --  67   ALT 44  --  40  --   --   --   --   --   --  30   AST 40*  --  39*  --   --   --   --   --   --  34   BILITOT 0.7  --  0.6  --   --   --   --   --   --  0.7    < > = values in this interval not displayed.       CARDIAC PROFILE LAST 3 DAYS  Recent Labs   Lab 01/18/24  1349 01/19/24  0502   CPK  --  64   LDH  --  256*   TROPONINI 0.026  --        ENDOCRINE LAST 3 DAYS  Recent Labs   Lab 01/18/24  1350   TSH 0.669       LAST ARTERIAL BLOOD GAS  ABG  No results for input(s): "PH", "PO2", "PCO2", "HCO3", "BE" in the last 168 hours.    LAST 7 DAYS MICROBIOLOGY   Microbiology Results (last 7 days)       ** No results found for the last 168 hours. **            MOST RECENT IMAGING  CT Head Without Contrast  Narrative: EXAMINATION:  CT HEAD WITHOUT CONTRAST    CLINICAL HISTORY:  Mental status change, unknown cause;Responsive.  Need to see if expanding subdural;, .    TECHNIQUE:  PATIENT RADIATION DOSE: DLP(mGycm) 810    As per PQRS measures, all CT scans at this facility used dose modulation, iterative reconstruction, and/or weight based dose adjustment when appropriate to reduce radiation dose to as low as reasonably achievable.    COMPARISON:  01/21/2024    FINDINGS:  Serial axial images were obtained of the head without the administration of IV contrast. Both brain and bone parenchymal windows were obtained.  Additional coronal and sagittal reconstructions were obtained.  Ventricles, cisterns, and sulci are prominent in size.  There is again a right sub dural hematoma with relatively acute component which has a similar appearance to the " prior exam.  There is again mass effect at the right cerebral hemisphere with sulcal effacement and slight midline shift to the right which has a similar appearance to the prior exam.  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  Small parenchymal hemorrhages are again evident at the anterior right frontal lobe and at the posterior right parietal lobe.  Scattered hypodensities are seen within the periventricular white matter.  Intracranial atherosclerosis is seen.  Impression: 1. Right sub dural hematoma again identified with small parenchymal hemorrhages again evident at the right frontal lobe and posterior right parietal lobe which have a similar appearance to the prior exam  2. Again mass effect at the right cerebral hemisphere with sulcal effacement and slight midline shift which has a similar appearance to the prior exam  3. 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 Generalized cerebral and cerebellar atrophy  5. Intracranial atherosclerosis    Electronically signed by: Phill Giles  Date:    01/24/2024  Time:    16:25  X-Ray Abdomen AP 1 View  Narrative: EXAMINATION:  XR ABDOMEN AP 1 VIEW    CLINICAL HISTORY:  Patient unresponsive.  Need more information.  Patient has COVID, recent on off constipation;, .    COMPARISON:  None available    FINDINGS:  Single AP or more views reveal multiple gas distended loops of bowel are again evident at the lower abdomen/pelvis with the suspect fecal bolus at the rectum which has a similar appearance to the prior exam.  Degenerative changes and curvature are noted to the thoracolumbar spine.  Bony structures are osteopenic.Prominent costochondral calcifications are again identified at the lower ribs.  Impression: 1. Gas distended loops of bowel again evident the lower abdomen pelvis with fecal bolus at  the rectum which has a similar appearance to the recent exam  2. Lumbar spondylosis and scoliosis  3. Osteopenia    Electronically signed by: Phill Giles  Date:    01/24/2024  Time:    16:22      [facility]  Results for orders placed during the hospital encounter of 12/26/23    Echo    Interpretation Summary    Right Ventricle: Right ventricle was not well visualized due to poor acoustic window. Normal right ventricular cavity size. Systolic function is normal.    Left Atrium: Left atrium is mildly dilated.    Right Atrium: Right atrium is mildly dilated.    Aortic Valve: Moderately calcified cusps. There is moderate stenosis. Aortic valve peak velocity is 2.85 m/s. There is moderate to severe aortic regurgitation. AR PHTN 274 msec.    If clinically indicated a ALMAS can be helpful to further assess the severity of AV disease.    Left Ventricle: The left ventricle is normal in size. There is mild concentric hypertrophy. There is normal systolic function with a visually estimated ejection fraction of 55 - 60%.    Mitral Valve: Moderately thickened leaflets. Moderately calcified leaflets. There is mild stenosis. The mean pressure gradient across the mitral valve is 6 mmHg at a heart rate of  bpm. There is mild to moderate regurgitation.    Tricuspid Valve: There is no stenosis. There is mild regurgitation.    Pulmonic Valve: There is no stenosis.    Pleural effusion is present which can be better assessed by dedicated chest imaging.      CURRENT/PREVIOUS VISIT EKG  Results for orders placed or performed during the hospital encounter of 12/26/23   EKG 12-lead    Collection Time: 12/27/23 12:10 PM    Narrative    Test Reason : I69.952,    Vent. Rate : 076 BPM     Atrial Rate : 076 BPM     P-R Int : 164 ms          QRS Dur : 108 ms      QT Int : 406 ms       P-R-T Axes : 057 -74 084 degrees     QTc Int : 456 ms    Normal sinus rhythm  Left axis deviation  IVCD in LBBB morphology  Non-specific ST-T wave  abnormalities  Abnormal ECG  No previous ECGs available  Confirmed by Sihraz ESCALERA, Ronald ([id number]) on 12/27/2023 3:30:29 PM    Referred By: [facility]   SELF           Confirmed By:Ronald Weldon MD       ASSESSMENT/PLAN:     Active Hospital Problems    Diagnosis    *SDH (subdural hematoma)    Cervical pain (neck)     C-spine x-ray , pt consulted  Start tramadol prn      Scalp laceration    COVID-19     Will get some markers in the morning.  Patient vaccination status unclear.  Will check with my lab in the morning office.  Will get COVID markers in the morning.  Patient was not requiring oxygen at this point.  To low risk because of age.      Lower extremity pain, anterior, unspecified laterality    Hyponatremia    Encephalopathy, metabolic       ASSESSMENT & PLAN:   From a neurologic perspective concerned about the patient having a recurrent CVA.  The CT repeat did not show anything acute.  Concerned about something occult.  Too unstable for MRI not cooperative.  Not a good methodology for testing at this point.  Also considering and invoking COVID as a hypercoagulable state here patient is also more hypoxemic today.  I am going to also get some cardiac enzymes to see if she has had an MI.    From a cardiac standpoint again follow up cardiac enzymes.    From a pulmonary standpoint chest x-ray looks okay.  I have added some Rocephin to cover for any infection.    From a GI standpoint will give her suppositories for her fecal bolus.      From a renal perspective she is stable.  However from a endocrinologic perspective I am concerned she may have continued sodium issues because of SIADH versus adrenal insufficiency.  I am going to give her a dose of hydrocortisone IV once my cardiac enzymes are negative.  For right now will continue hot salt as well.      I have added Rocephin for generalized coverage follow up on urinary culture.            RECOMMENDATIONS:  Lengthy discussion over an hour with the daughter Padmini at the  bedside.  Reasonable goals of care or determining potential source that would explain change acutely in the patient.  Would not recommend MRI this point.  If cardiac enzymes negative and she continues showed decline will try some hydrocortisone she still does not improve we may invoke hospice or just withdraw care.  The daughter prefers hospice in a nursing home context.    We explained the DVT prophylaxis and prophylactic use of blood thinners in the setting of COVID were contraindicated because of the patient's subdural bleed.  I am also concerned that the patient is day 7-10 which is when COVID patients typically declare themselves from a prognostic standpoint my experience.  Family's where this as well.  She is day 7 diagnosis today.        Tomer Samaniego III, MD  Department of Hospital Medicine ([facility])   Date of Service: 01/24/2024  5:38 PM

## 2024-01-24 NOTE — PT/OT/SLP PROGRESS
"Speech Language Pathology Treatment    Patient Name:  Bhavani Galarza   MRN:  [id number]  Admitting Diagnosis: SDH (subdural hematoma)    Recommendations:                 General Recommendations:  Dysphagia therapy  Diet recommendations:  Soft & Bite Sized Diet - IDDSI Level 6, Liquid Diet Level: Thin   Aspiration Precautions: 1 bite/sip at a time, Alternating bites/sips, Assistance with meals, and Standard aspiration precautions   General Precautions: Standard, aspiration, fall  Communication strategies:  hearing aids    Assessment:     ST aleksander tx at 1040. Daughter present at bedside, reported Pt was sleeping and asked SLP to return later. Daughter provided information regarding Pt's current level of functioning in regards to cognitive, communication, and swallow compared to PLOF. She reports that she noticed the Patient "pocketing" food but overall tolerating it well.     SLP returned at 11:50 to assess Pt with lunch. Son in law present at bedside. Pt consumed one bolus of rice and container of yogurt. +rotary mastication noted w/o residue or pocketing. Cough noted x 1 with thin liquids via cup drinks followed by consistent throat clearing probably cause by irritation from initial aspiration event. Tolerated puree/solids without incident. Given her already reduced intake, SLP does not want to alter diet textures as this may further reduce her intake. Provided education to family to encourage Pt for chew on right side, cyclic ingestion to clear any residue post bolus or meal. Son in law verbalized understanding.      Subjective     Pt alert, hearing aids not intact so Pt demonstrated difficulties answering questions.     Patient goals:      Pain/Comfort:  Not pain    Respiratory Status: Room air    Objective:     See assessment section above.     Goals:   Multidisciplinary Problems       SLP Goals          Problem: SLP    Goal Priority Disciplines Outcome   SLP Goal     SLP Ongoing, Progressing   Description: " LTG:  -Pt will maintain adequate hydration/nutrition with optimum safety and efficiency of swallowing function on P.O. intake without overt signs and symptoms of aspiration for the highest appropriate diet level.    -Pt will utilize compensatory strategies with optimum safety and efficiency of swallowing function on P.O. intake without overt signs and symptoms of aspiration for the highest appropriate diet level.        STG:  -Pt will complete a Modified Barium Swallow Study to fully assess physiology and anatomy of the swallow and to determine the appropriate diet and/or rehabilitation exercises/POC.    -Pt will tolerate diet upgrade trials without signs and/or symptoms of aspiration with to safely least restrictive diet with (min/mod/max) verbal, visual and tactile cues.      -Pt will safely ingest diet trials during therapeutic feedings with the SLP without signs and/or symptoms of aspiration with to safely consume least restrictive diet with (min/mod/max) verbal, visual and tactile cues.                         Plan:     Patient to be seen:   (3-5 x/week)   Plan of Care expires:  02/05/24  Plan of Care reviewed with:  Family and Care Team  SLP Follow-Up:  yes      Discharge recommendations: Continue ST  Barriers to Discharge:  Level of Skilled Assistance Needed   and Safety Awareness      Time Tracking:     SLP Treatment Date:   01/24/24  Speech Start Time:  1155  Speech Stop Time:  1215     Speech Total Time (min):  20 min    Billable Minutes: Treatment Swallowing Dysfunction 20 min/ 2 units    Marylu Calhoun MS, CCC-SLP  01/24/2024

## 2024-01-24 NOTE — PT/OT/SLP PROGRESS
Physical Therapy Treatment    Patient Name:  Bhavani Galarza   MRN:  [id number]    Recommendations:     Discharge Recommendations:    Discharge Equipment Recommendations:  (to be determined once activity limitations are lifted)  Barriers to discharge: Decreased caregiver support    Assessment:     Bhavani Galarza is a 89 y.o. female admitted with a medical diagnosis of SDH (subdural hematoma).  She presents with the following impairments/functional limitations: pain, weakness  Rehab Prognosis: Fair; patient would benefit from acute skilled PT services to address these deficits and reach maximum level of function.    Recent Surgery: * No surgery found *      Plan:     During this hospitalization, patient to be seen 5 x/week to address the identified rehab impairments via therapeutic exercises and progress toward the following goals:    Plan of Care Expires:       Subjective     Chief Complaint: headache, weakness  Patient/Family Comments/goals: to get up to BSC today for BM  Pain/Comfort:         Objective:     Communicated with pt and caregiver prior to session.  Pt is not a BID appropriate pt and has been seen this morning for her QD but pt family requesting/insisiting pt needs to try to walk with a walker betsy she walked a few steps hand held assist this morning and they want to see her walk with a walker.  Patient found up in chair asleep with   upon PT entry to room.   Pt reluctant to try and req. Max encouragement.  Pt states she wants to sleep.    General Precautions: Standard,    Orthopedic Precautions:    Braces:    Respiratory Status: Room air     Functional Mobility:  Pt attempted to stand twice with walker and Ax2 helpers but too fatigued to stand erect, falling back into the chair req. totalAx2 to prevent a fall.  Pt unable to amb at this time.  Pt was assisted chair to bed ax2 helpers      AM-PAC 6 CLICK MOBILITY          Treatment & Education:  Pt with profound weakness, left hemiparesis, significant  fatigue this afternoon, falls asleep in bed before we finished covering pt with blankets.    Patient left with head of bed up  with bed alarm on, all lines intact, call bell in reach, caregiver with pt.  GOALS:   Multidisciplinary Problems       Physical Therapy Goals          Problem: Physical Therapy    Goal Priority Disciplines Outcome Goal Variances Interventions   Physical Therapy Goal     PT, PT/OT Ongoing, Progressing     Description: 1.  Pt to be instructed in safe, appropriate exercises in bed to maintain BLE AROM and strength while on current activity limitations today 1-19-24.  2.  Pt to be reassessed and MD/Nurse to be consulted prior to further PT on Monday 1-22-24 in regards to activity limitations and medical status.                       Time Tracking:     PT Received On: 01/24/24  PT Start Time: 1435     PT Stop Time: 1443  PT Total Time (min): 8 min     Billable Minutes: Therapeutic Activity 8      Treatment Type: Treatment  PT/PTA: PT           01/24/2024

## 2024-01-25 PROBLEM — R79.89 ELEVATED TROPONIN I LEVEL: Status: ACTIVE | Noted: 2024-01-25

## 2024-01-25 LAB
ALBUMIN SERPL-MCNC: 2.3 G/DL (ref 3.4–4.8)
ALBUMIN/GLOB SERPL: 0.8 RATIO (ref 1.1–2)
ALP SERPL-CCNC: 66 UNIT/L (ref 40–150)
ALT SERPL-CCNC: 27 UNIT/L (ref 0–55)
AST SERPL-CCNC: 42 UNIT/L (ref 5–34)
BASOPHILS # BLD AUTO: 0.03 X10(3)/MCL
BASOPHILS NFR BLD AUTO: 0.3 %
BILIRUB SERPL-MCNC: 0.6 MG/DL
BNP BLD-MCNC: 1729.5 PG/ML
BUN SERPL-MCNC: 14 MG/DL (ref 9.8–20.1)
CALCIUM SERPL-MCNC: 8.2 MG/DL (ref 8.4–10.2)
CHLORIDE SERPL-SCNC: 107 MMOL/L (ref 98–111)
CO2 SERPL-SCNC: 21 MMOL/L (ref 23–31)
CORTIS SERPL-SCNC: 16.1 UG/DL
CREAT SERPL-MCNC: 0.53 MG/DL (ref 0.55–1.02)
CRP SERPL-MCNC: 148.8 MG/L
EOSINOPHIL # BLD AUTO: 0.05 X10(3)/MCL (ref 0–0.9)
EOSINOPHIL NFR BLD AUTO: 0.5 %
ERYTHROCYTE [DISTWIDTH] IN BLOOD BY AUTOMATED COUNT: 13.1 % (ref 11.5–17)
ERYTHROCYTE [SEDIMENTATION RATE] IN BLOOD: 56 MM/HR (ref 0–20)
GFR SERPLBLD CREATININE-BSD FMLA CKD-EPI: >60 MLS/MIN/1.73/M2
GLOBULIN SER-MCNC: 2.9 GM/DL (ref 2.4–3.5)
GLUCOSE SERPL-MCNC: 116 MG/DL (ref 75–121)
HCT VFR BLD AUTO: 29.2 % (ref 37–47)
HGB BLD-MCNC: 9.8 G/DL (ref 12–16)
IMM GRANULOCYTES # BLD AUTO: 0.04 X10(3)/MCL (ref 0–0.04)
IMM GRANULOCYTES NFR BLD AUTO: 0.4 %
LACTATE SERPL-SCNC: 1 MMOL/L (ref 0.5–2.2)
LYMPHOCYTES # BLD AUTO: 1.03 X10(3)/MCL (ref 0.6–4.6)
LYMPHOCYTES NFR BLD AUTO: 9.5 %
MAGNESIUM SERPL-MCNC: 1.6 MG/DL (ref 1.6–2.6)
MCH RBC QN AUTO: 32.1 PG (ref 27–31)
MCHC RBC AUTO-ENTMCNC: 33.6 G/DL (ref 33–36)
MCV RBC AUTO: 95.7 FL (ref 80–94)
MONOCYTES # BLD AUTO: 0.43 X10(3)/MCL (ref 0.1–1.3)
MONOCYTES NFR BLD AUTO: 4 %
NEUTROPHILS # BLD AUTO: 9.23 X10(3)/MCL (ref 2.1–9.2)
NEUTROPHILS NFR BLD AUTO: 85.3 %
PHOSPHATE SERPL-MCNC: 2.3 MG/DL (ref 2.3–4.7)
PLATELET # BLD AUTO: 233 X10(3)/MCL (ref 130–400)
PMV BLD AUTO: 9.3 FL (ref 7.4–10.4)
POTASSIUM SERPL-SCNC: 3.4 MMOL/L (ref 3.5–5.1)
PROLACTIN LEVEL (OLG): 19.85 NG/ML (ref 5.18–26.53)
PROT SERPL-MCNC: 5.2 GM/DL (ref 5.8–7.6)
RBC # BLD AUTO: 3.05 X10(6)/MCL (ref 4.2–5.4)
SODIUM SERPL-SCNC: 135 MMOL/L (ref 132–146)
SODIUM SERPL-SCNC: 135 MMOL/L (ref 132–146)
SODIUM SERPL-SCNC: 136 MMOL/L (ref 132–146)
TROPONIN I SERPL-MCNC: 3.31 NG/ML (ref 0–0.04)
TROPONIN I SERPL-MCNC: 3.34 NG/ML (ref 0–0.04)
WBC # SPEC AUTO: 10.81 X10(3)/MCL (ref 4.5–11.5)

## 2024-01-25 PROCEDURE — 25000003 PHARM REV CODE 250: Performed by: INTERNAL MEDICINE

## 2024-01-25 PROCEDURE — 84484 ASSAY OF TROPONIN QUANT: CPT | Performed by: INTERNAL MEDICINE

## 2024-01-25 PROCEDURE — 83880 ASSAY OF NATRIURETIC PEPTIDE: CPT | Performed by: INTERNAL MEDICINE

## 2024-01-25 PROCEDURE — 25000003 PHARM REV CODE 250: Performed by: FAMILY MEDICINE

## 2024-01-25 PROCEDURE — 86140 C-REACTIVE PROTEIN: CPT | Performed by: INTERNAL MEDICINE

## 2024-01-25 PROCEDURE — 83605 ASSAY OF LACTIC ACID: CPT | Performed by: INTERNAL MEDICINE

## 2024-01-25 PROCEDURE — 84295 ASSAY OF SERUM SODIUM: CPT | Performed by: INTERNAL MEDICINE

## 2024-01-25 PROCEDURE — 83735 ASSAY OF MAGNESIUM: CPT | Performed by: INTERNAL MEDICINE

## 2024-01-25 PROCEDURE — 84146 ASSAY OF PROLACTIN: CPT | Performed by: INTERNAL MEDICINE

## 2024-01-25 PROCEDURE — 21400001 HC TELEMETRY ROOM

## 2024-01-25 PROCEDURE — 85652 RBC SED RATE AUTOMATED: CPT | Performed by: INTERNAL MEDICINE

## 2024-01-25 PROCEDURE — 80053 COMPREHEN METABOLIC PANEL: CPT | Performed by: INTERNAL MEDICINE

## 2024-01-25 PROCEDURE — 97116 GAIT TRAINING THERAPY: CPT

## 2024-01-25 PROCEDURE — 85025 COMPLETE CBC W/AUTO DIFF WBC: CPT | Performed by: INTERNAL MEDICINE

## 2024-01-25 PROCEDURE — 27000207 HC ISOLATION

## 2024-01-25 PROCEDURE — 99900035 HC TECH TIME PER 15 MIN (STAT)

## 2024-01-25 PROCEDURE — 84100 ASSAY OF PHOSPHORUS: CPT | Performed by: INTERNAL MEDICINE

## 2024-01-25 PROCEDURE — 93010 ELECTROCARDIOGRAM REPORT: CPT | Mod: ,,, | Performed by: INTERNAL MEDICINE

## 2024-01-25 PROCEDURE — 93005 ELECTROCARDIOGRAM TRACING: CPT

## 2024-01-25 PROCEDURE — 82533 TOTAL CORTISOL: CPT | Performed by: INTERNAL MEDICINE

## 2024-01-25 PROCEDURE — 94761 N-INVAS EAR/PLS OXIMETRY MLT: CPT

## 2024-01-25 PROCEDURE — 63600175 PHARM REV CODE 636 W HCPCS: Performed by: INTERNAL MEDICINE

## 2024-01-25 RX ORDER — ACETAMINOPHEN 325 MG/1
650 TABLET ORAL
Status: DISCONTINUED | OUTPATIENT
Start: 2024-01-25 | End: 2024-01-30 | Stop reason: HOSPADM

## 2024-01-25 RX ORDER — POTASSIUM CHLORIDE 20 MEQ/1
20 TABLET, EXTENDED RELEASE ORAL DAILY
Status: DISCONTINUED | OUTPATIENT
Start: 2024-01-25 | End: 2024-01-26

## 2024-01-25 RX ORDER — SODIUM CHLORIDE 1 G/1
1000 TABLET ORAL 2 TIMES DAILY
Status: DISCONTINUED | OUTPATIENT
Start: 2024-01-25 | End: 2024-01-30 | Stop reason: HOSPADM

## 2024-01-25 RX ADMIN — DOCUSATE SODIUM LIQUID 50 MG: 100 LIQUID ORAL at 10:01

## 2024-01-25 RX ADMIN — DOCUSATE SODIUM LIQUID 50 MG: 100 LIQUID ORAL at 09:01

## 2024-01-25 RX ADMIN — POLYETHYLENE GLYCOL 3350 17 G: 17 POWDER, FOR SOLUTION ORAL at 10:01

## 2024-01-25 RX ADMIN — POTASSIUM CHLORIDE 20 MEQ: 1500 TABLET, EXTENDED RELEASE ORAL at 12:01

## 2024-01-25 RX ADMIN — CEFTRIAXONE SODIUM 1 G: 1 INJECTION, POWDER, FOR SOLUTION INTRAMUSCULAR; INTRAVENOUS at 05:01

## 2024-01-25 RX ADMIN — ACETAMINOPHEN 650 MG: 325 TABLET, FILM COATED ORAL at 05:01

## 2024-01-25 RX ADMIN — ATORVASTATIN CALCIUM 40 MG: 40 TABLET, FILM COATED ORAL at 10:01

## 2024-01-25 RX ADMIN — GABAPENTIN 300 MG: 300 CAPSULE ORAL at 10:01

## 2024-01-25 RX ADMIN — SODIUM CHLORIDE TAB 1 GM 1000 MG: 1 TAB at 05:01

## 2024-01-25 RX ADMIN — GABAPENTIN 300 MG: 300 CAPSULE ORAL at 09:01

## 2024-01-25 RX ADMIN — AMLODIPINE BESYLATE 5 MG: 5 TABLET ORAL at 10:01

## 2024-01-25 RX ADMIN — ACETAMINOPHEN 650 MG: 325 TABLET, FILM COATED ORAL at 10:01

## 2024-01-25 RX ADMIN — SODIUM CHLORIDE 25 ML/HR: 3 INJECTION, SOLUTION INTRAVENOUS at 01:01

## 2024-01-25 NOTE — PT/OT/SLP PROGRESS
Physical Therapy Treatment    Patient Name:  Bhavani Galarza   MRN:  [id number]    Recommendations:     Discharge Recommendations:    Discharge Equipment Recommendations:  (to be determined once activity limitations are lifted)  Barriers to discharge: Decreased caregiver support    Assessment:     Bhavani Galarza is a 89 y.o. female admitted with a medical diagnosis of SDH (subdural hematoma).  She presents with the following impairments/functional limitations: pain, weakness  Rehab Prognosis: Fair; patient would benefit from acute skilled PT services to address these deficits and reach maximum level of function.    Recent Surgery: * No surgery found *      Plan:     During this hospitalization, patient to be seen 5 x/week to address the identified rehab impairments via therapeutic exercises and progress toward the following goals:    Plan of Care Expires:       Subjective     Chief Complaint: headache, weakness  Patient/Family Comments/goals: to get up to walk  Pain/Comfort:         Objective:     Communicated with pt and caregiver prior to session.    Patient found with head of bed up  with alertness, expressing desire to get up  upon PT entry to room.   General Precautions: Standard,    Orthopedic Precautions:    Braces:    Respiratory Status: Room air     Functional Mobility:  Pt amb 50ft RW Sue  Pt tf to chair Sue      AM-PAC 6 CLICK MOBILITY          Treatment & Education:  Pt left up in chair  with chair alarm on, all lines intact, call bell in reach, caregiver with pt.  GOALS:   Multidisciplinary Problems       Physical Therapy Goals          Problem: Physical Therapy    Goal Priority Disciplines Outcome Goal Variances Interventions   Physical Therapy Goal     PT, PT/OT Ongoing, Progressing     Description: 1.  Pt to be instructed in safe, appropriate exercises in bed to maintain BLE AROM and strength while on current activity limitations today 1-19-24.  2.  Pt to be reassessed and MD/Nurse to be  consulted prior to further PT on Monday 1-22-24 in regards to activity limitations and medical status.                       Time Tracking:     PT Received On: 01/25/24  PT Start Time: 1310     PT Stop Time: 1325  PT Total Time (min): 15 min     Billable Minutes: Therapeutic Activity 5   Gait training 10    Treatment Type: Treatment  PT/PTA: PT           01/25/2024

## 2024-01-25 NOTE — PLAN OF CARE
01/25/24 1238   Discharge Reassessment   Assessment Type Discharge Planning Reassessment   Discharge Plan discussed with: Adult children   Discharge Plan A Hospice/home   Discharge Plan B Hospice/home   DME Needed Upon Discharge  other (see comments)   Transition of Care Barriers None   Why the patient remains in the hospital Requires continued medical care   Post-Acute Status   Post-Acute Authorization Hospice   Hospice Status Discharge Plan Changed   Discharge Delays (!) Post-Acute Set-up     MD unsure as of yet if pt will d/c home or NH w/hospice services.  He said she will be here til Monday and then he and daughter will make final decision.  Updates sent to [facility].

## 2024-01-25 NOTE — PROGRESS NOTES
[facility]  [address] [address]  Phone: [phone]    ([facility]) Internal Medicine  Progress Note      PATIENT NAME: Bhavani Galarza  MRN: [id number]  TODAY'S DATE: 01/25/2024  ADMIT DATE: 1/18/2024    SUBJECTIVE     PRINCIPLE PROBLEM: SDH (subdural hematoma)    INTERVAL HISTORY:    1/25/2024  Patient is much more alert and oriented today.  She is essentially made a 180.  She denies anything except a stiff neck and headache.  She denies any chest pain.  Her troponins have increased from 0.8-3.3.  I disclose this to the daughter Padmini at the bedside.  The patient is denying any issues.  She has having no obvious EKG changes.  She is eating and urinating.  She has not had a bowel movement since yesterday.    Review of patient's allergies indicates:  No Known Allergies    ROS neck pain, head pain to the area of laceration, bilateral lower extremity pain that is chronic.    OBJECTIVE     VITAL SIGNS (Most Recent)  Temp: 97.7 °F (36.5 °C) (01/25/24 0927)  Pulse: 92 (01/25/24 0927)  Resp: 20 (01/24/24 1600)  BP: 99/63 (01/25/24 0927)  SpO2: (!) 91 % (01/25/24 0927)    VENTILATION STATUS  Resp: 20 (01/24/24 1600)  SpO2: (!) 91 % (01/25/24 0927)           I & O (Last 24H):  Intake/Output Summary (Last 24 hours) at 1/25/2024 1129  Last data filed at 1/25/2024 0930  Gross per 24 hour   Intake 680 ml   Output 1300 ml   Net -620 ml       WEIGHTS  Wt Readings from Last 3 Encounters:   01/25/24 0559 42.9 kg (94 lb 9.2 oz)   01/24/24 0652 43.8 kg (96 lb 9 oz)   01/21/24 0002 45.3 kg (99 lb 13.9 oz)   01/20/24 0414 41.9 kg (92 lb 6 oz)   01/19/24 0500 42.2 kg (93 lb 0.6 oz)   01/18/24 1535 42.2 kg (93 lb)   01/18/24 1235 42.2 kg (93 lb)   12/26/23 1719 45.4 kg (100 lb)   12/26/23 1050 45.4 kg (100 lb)   10/18/23 1343 40.8 kg (90 lb)       Physical Exam    Patient was alert and oriented x3.  Nurse does show and I discussed her case.  The daughter Padmini is at the bedside cranial  nerves 2-12 are intact no slurred speech.  She is arousable she is awake oriented and having full discussions and humerus.  Clear to auscultation bilaterally soft nontender nondistended abdomen she is on nasal cannula 2 liters/minute.  No clubbing cyanosis or edema bilateral lower extremities.  Palpable pulses bilaterally lower extremities    SCHEDULED MEDS:   acetaminophen  650 mg Oral Q8H WA    amLODIPine  5 mg Oral Daily    atorvastatin  40 mg Oral Daily    bisacodyL  10 mg Rectal Daily    cefTRIAXone (Rocephin) IV (PEDS and ADULTS)  1 g Intravenous Q12H    dexAMETHasone  4 mg Intravenous Once    docusate  50 mg Oral BID    gabapentin  300 mg Oral BID    polyethylene glycol  17 g Oral Daily    potassium chloride  20 mEq Oral Daily    sodium chloride 0.9%  10 mL Intravenous Q6H       CONTINUOUS INFUSIONS:   sodium chloride 3% HYPERTONIC 25 mL/hr (01/24/24 1822)       PRN MEDS:albuterol-ipratropium, bisacodyL, hydrALAZINE, sodium chloride 0.9%, Flushing PICC/Midline Protocol **AND** sodium chloride 0.9% **AND** sodium chloride 0.9%    LABS AND DIAGNOSTICS     CBC LAST 3 DAYS  Recent Labs   Lab 01/22/24  0739 01/24/24  1616 01/25/24  0523   WBC 5.96 10.55 10.81   RBC 3.49* 3.13* 3.05*   HGB 11.0* 9.9* 9.8*   HCT 32.2* 28.4* 29.2*   MCV 92.3 90.7 95.7*   MCH 31.5* 31.6* 32.1*   MCHC 34.2 34.9 33.6   RDW 12.6 12.7 13.1    210 233   MPV 9.5 9.1 9.3       COAGULATION LAST 3 DAYS  Recent Labs   Lab 01/18/24  1350   INR 1.0       CHEMISTRY LAST 3 DAYS  Recent Labs   Lab 01/20/24  0639 01/20/24  0809 01/21/24  1910 01/22/24  0004 01/24/24  1616 01/25/24  0523 01/25/24  0802      < > 129*   < > 132 135 135   K 3.5   < > 3.7  --  2.6* 3.4*  --    CO2 22*   < > 19*  --  23 21*  --    BUN 15.0   < > 14.0  --  19.0 14.0  --    CREATININE 0.55   < > 0.55  --  0.54* 0.53*  --    CALCIUM 8.5   < > 8.3*  --  8.0* 8.2*  --    MG  --   --   --   --   --  1.60  --    ALBUMIN 2.9*  --   --   --  2.5* 2.3*  --    ALKPHOS 70  " --   --   --  67 66  --    ALT 40  --   --   --  30 27  --    AST 39*  --   --   --  34 42*  --    BILITOT 0.6  --   --   --  0.7 0.6  --     < > = values in this interval not displayed.       CARDIAC PROFILE LAST 3 DAYS  Recent Labs   Lab 01/18/24  1349 01/19/24  0502 01/24/24  1616 01/25/24  0523   BNP  --   --   --  1,729.5*   CPK  --  64 118  --    LDH  --  256* 226*  --    TROPONINI 0.026  --  0.866* 3.338*       ENDOCRINE LAST 3 DAYS  Recent Labs   Lab 01/18/24  1350   TSH 0.669       LAST ARTERIAL BLOOD GAS  ABG  No results for input(s): "PH", "PO2", "PCO2", "HCO3", "BE" in the last 168 hours.    LAST 7 DAYS MICROBIOLOGY   Microbiology Results (last 7 days)       ** No results found for the last 168 hours. **            MOST RECENT IMAGING  X-Ray Chest 1 View  Narrative: EXAMINATION:  XR CHEST 1 VIEW    CLINICAL HISTORY:  Patient unresponsive need more information has COVID.;    TECHNIQUE:  Single frontal view of the chest was performed.    COMPARISON:  Radiographs 01/19/2024    FINDINGS:  Right-sided PICC line tip overlies the SVC.  The cardiomediastinal silhouette is stable.  Aortic vascular calcifications.  Ill-defined ground-glass attenuation in the bilateral mid to lower lung zones which appears increased compared to the prior radiograph.  No pneumothorax.  Possible trace left pleural effusion, similar to prior studies.  No acute osseous abnormality identified.  Impression: 1. Ill-defined ground-glass attenuation in the bilateral mid to lower lung zones appears increased compared to the prior radiograph and may reflect edema or infection.  2. Possible trace left pleural effusion, similar to prior.    Electronically signed by: Micah Chatman  Date:    01/24/2024  Time:    18:16  CT Head Without Contrast  Narrative: EXAMINATION:  CT HEAD WITHOUT CONTRAST    CLINICAL HISTORY:  Mental status change, unknown cause;Responsive.  Need to see if expanding subdural;, .    TECHNIQUE:  PATIENT RADIATION DOSE: " DLP(mGycm) 810    As per PQRS measures, all CT scans at this facility used dose modulation, iterative reconstruction, and/or weight based dose adjustment when appropriate to reduce radiation dose to as low as reasonably achievable.    COMPARISON:  01/21/2024    FINDINGS:  Serial axial images were obtained of the head without the administration of IV contrast. Both brain and bone parenchymal windows were obtained.  Additional coronal and sagittal reconstructions were obtained.  Ventricles, cisterns, and sulci are prominent in size.  There is again a right sub dural hematoma with relatively acute component which has a similar appearance to the prior exam.  There is again mass effect at the right cerebral hemisphere with sulcal effacement and slight midline shift to the right which has a similar appearance to the prior exam.  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  Small parenchymal hemorrhages are again evident at the anterior right frontal lobe and at the posterior right parietal lobe.  Scattered hypodensities are seen within the periventricular white matter.  Intracranial atherosclerosis is seen.  Impression: 1. Right sub dural hematoma again identified with small parenchymal hemorrhages again evident at the right frontal lobe and posterior right parietal lobe which have a similar appearance to the prior exam  2. Again mass effect at the right cerebral hemisphere with sulcal effacement and slight midline shift which has a similar appearance to the prior exam  3. 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 Generalized cerebral and cerebellar atrophy  5. Intracranial atherosclerosis    Electronically signed by: Phill Giles  Date:    01/24/2024  Time:    16:25  X-Ray Abdomen AP 1 View  Narrative: EXAMINATION:  XR ABDOMEN AP 1 VIEW    CLINICAL  HISTORY:  Patient unresponsive.  Need more information.  Patient has COVID, recent on off constipation;, .    COMPARISON:  None available    FINDINGS:  Single AP or more views reveal multiple gas distended loops of bowel are again evident at the lower abdomen/pelvis with the suspect fecal bolus at the rectum which has a similar appearance to the prior exam.  Degenerative changes and curvature are noted to the thoracolumbar spine.  Bony structures are osteopenic.Prominent costochondral calcifications are again identified at the lower ribs.  Impression: 1. Gas distended loops of bowel again evident the lower abdomen pelvis with fecal bolus at the rectum which has a similar appearance to the recent exam  2. Lumbar spondylosis and scoliosis  3. Osteopenia    Electronically signed by: Phill Giles  Date:    01/24/2024  Time:    16:22      [facility]  Results for orders placed during the hospital encounter of 12/26/23    Echo    Interpretation Summary    Right Ventricle: Right ventricle was not well visualized due to poor acoustic window. Normal right ventricular cavity size. Systolic function is normal.    Left Atrium: Left atrium is mildly dilated.    Right Atrium: Right atrium is mildly dilated.    Aortic Valve: Moderately calcified cusps. There is moderate stenosis. Aortic valve peak velocity is 2.85 m/s. There is moderate to severe aortic regurgitation. AR PHTN 274 msec.    If clinically indicated a ALMAS can be helpful to further assess the severity of AV disease.    Left Ventricle: The left ventricle is normal in size. There is mild concentric hypertrophy. There is normal systolic function with a visually estimated ejection fraction of 55 - 60%.    Mitral Valve: Moderately thickened leaflets. Moderately calcified leaflets. There is mild stenosis. The mean pressure gradient across the mitral valve is 6 mmHg at a heart rate of  bpm. There is mild to moderate regurgitation.    Tricuspid Valve: There is no stenosis. There  is mild regurgitation.    Pulmonic Valve: There is no stenosis.    Pleural effusion is present which can be better assessed by dedicated chest imaging.      CURRENT/PREVIOUS VISIT EKG  Results for orders placed or performed during the hospital encounter of 01/18/24   EKG 12-lead    Collection Time: 01/25/24  9:13 AM    Narrative    Test Reason : I63.9,    Vent. Rate : 094 BPM     Atrial Rate : 094 BPM     P-R Int : 184 ms          QRS Dur : 108 ms      QT Int : 390 ms       P-R-T Axes : 083 265 087 degrees     QTc Int : 487 ms    Normal sinus rhythm  Anterolateral infarct (cited on or before 25-JAN-2024)  Abnormal ECG  When compared with ECG of 27-DEC-2023 12:10,  Serial changes of evolving Anterior infarct Present  Serial changes of evolving Anterolateral infarct Present    Referred By: [email or web address]   SELF           Confirmed By:        ASSESSMENT/PLAN:     Active Hospital Problems    Diagnosis    *SDH (subdural hematoma)    Elevated troponin I level    Cervical pain (neck)     C-spine x-ray , pt consulted  Start tramadol prn      Scalp laceration    COVID-19     Will get some markers in the morning.  Patient vaccination status unclear.  Will check with my lab in the morning office.  Will get COVID markers in the morning.  Patient was not requiring oxygen at this point.  To low risk because of age.      Lower extremity pain, anterior, unspecified laterality    Hyponatremia    Encephalopathy, metabolic       ASSESSMENT & PLAN:   Patient with a new onset elevated troponin level.  I have to think that the patient may be having some cardiac ischemia which may have contributed to some vascular perfusion to her brain yesterday.  No other signs of the explanation infection.  I will be I started her on some Rocephin yesterday.  But nothing came back positive.    I also discussed scheduling her Tylenol Q 6-8 hours for her neck pain.  I do not think the patient would do well with NSAIDs because of potential bleed with a recent  subdural and I would want to give her narcotics to cloud the picture she is improving like she was yesterday into today.  So will schedule Tylenol.  That was done Q 8 hours while awake.      Continue scalp laceration care.      Nothing really say about her COVID-19.  I think this is a relatively benign strain.  She has not having any symptomatology maybe some oxygen dependency for 2 L but again I do not think her COVID is making her worse as of today.      Will continue 2 Tylenol for lower extremity pain gabapentin.      From a hyponatremia standpoint I will need my on-call colleague to follow up on her a.m. cortisol.  If it is low then would consider fludrocortisone or hydrocortisone initiation for suspected adrenal insufficiency/failure.  She did present twice to the hospital in the last 3 weeks with low-sodium.  All medications associated with hyponatremia been removed.      Her encephalopathy seems to have resolved today.  Again with the acuity of it is coming and going this may be vascular or cardiovascular in nature from a perfusion standpoint.      Relative contraindication to treat for ACS in the setting of her recent Sea brain bleed in the last7 days.  Family understands.       RECOMMENDATIONS:  Lengthy discussion with the daughter Noreen.  We discussed going to skilled nursing facility on Monday assuming her cardiac enzymes and her mild cardiac ischemia slow down last next couple days.  Patient wants to do some rehabilitation.  I think that is reasonable however if she does decline, I have her already consulted with hospice [facility].  In that case will have to get her skilled nursing bed transitioned to a resident nursing home status bed.  Defer to Francine with case management to assist usually to initiate.        Tomer Samaniego III, MD  Department of Hospital Medicine ([facility])   Date of Service: 01/25/2024  11:26 AM

## 2024-01-25 NOTE — PROGRESS NOTES
Inpatient Nutrition Assessment    Admit Date: 1/18/2024   Total duration of encounter: 7 days   Patient Age: 89 y.o.    Nutrition Recommendation/Prescription     Recommend Soft & Bite Size diet per SLP recommendations as tolerated and assist with intake.   Yogurt on meal trays per pt's request.  Continue Boost Plus, TID. Provides 360 kcal, 14 g protein per serving.  If more aggressive nutrition therapy desired such as enteral or parenteral nutrition, RD available to provide recommendations.   Monitor lytes and replete as needed.   Monitor intake, weight, and labs.     RD following and available as needed. Thank you.     Communication of Recommendations: reviewed with nurse and EMR.     Nutrition Assessment     Malnutrition Assessment/Nutrition-Focused Physical Exam    Unable to complete NFPE. Will attempt at f/u.     Chart Review    Reason Seen: continuous nutrition monitoring and follow-up    Malnutrition Screening Tool Results   Have you recently lost weight without trying?: No  Have you been eating poorly because of a decreased appetite?: No   MST Score: 0   Diagnosis:  SDH (Subdural hematoma).    Relevant Medical History:   Spinal stenosis, lumbar region without neurogenic claudication     Nutrition-Related Medications:   IV Fluids: Sodium Chloride 3%@ 25 mL//hr.   Protonix; Docusate; Kcl.         Calorie Containing IV Medications: no significant kcals from medications at this time    Nutrition-Related Labs:  1/25: H/H 9.8/29.2(L); K+ 3.4(L); CO2(L); Ca+ 8.2(L); Alb 2.3(L); AST 42(H)  1/20: H/H 10.3/30.6(L);  K+ (L); CO2 22(L); Crea 0.52(L); Alb 2.9(L); AST 39(H).    Nutrition Orders:   Diet heart healthy      Appetite/Oral Intake: fair/25-50% of meals    Factors Affecting Nutritional Intake: none identified    Food/Druze/Cultural Preferences:  Pt likes yogurt.     Food Allergies: none reported    Wound(s):       Last Bowel Movement: 01/24/24    Comments    1/25: Nursing reports pt had an abrupt change in  "status yesterday mid morning, becoming unresponsive, which improved somewhat by late afternoon. Pt consumed 25% of breakfast today. She does like yogurt and is receiving yogurt on meal trays; as well as, Boost Plus. Noted ~ 2 lb weight loss x 1 day per EMR recorded weight. Labs and meds reviewed. Discussed with nursing. Will continue to monitor during stay.     1/20: Pt admitted with Na+  of 129. MD addressing and Na+ WNL today. Last recorded intake 50%. Pt request yogurt on meal trays. Kitchen notified of request and nursing reports pt is eating yogurt. No recent weight loss noted/reported on nursing admit screen. Noted pt's family does not want any heroic measures. Will add Boost Plus, TID, to assist with intake and continue to monitor during stay.      Anthropometrics    Height: 5' 0.98" (154.9 cm) Height Method: Stated  Last Weight: 42.9 kg (94 lb 9.2 oz) (01/25/24 0559) Weight Method: Bed Scale  BMI (Calculated): 17.9  BMI Classification: underweight (BMI less than 22 if >65 years of age)     Ideal Body Weight (IBW), Female: 104.9 lb     % Ideal Body Weight, Female (lb): 88.57 %                             Usual Weight Provided By: EMR weight history    Wt Readings from Last 5 Encounters:   01/25/24 42.9 kg (94 lb 9.2 oz)   12/26/23 45.4 kg (100 lb)   10/18/23 40.8 kg (90 lb)   09/19/23 43.1 kg (95 lb)   02/24/20 45.4 kg (100 lb 1.4 oz)     Weight Change(s) Since Admission:   Wt Readings from Last 1 Encounters:   01/25/24 0559 42.9 kg (94 lb 9.2 oz)   01/24/24 0652 43.8 kg (96 lb 9 oz)   01/21/24 0002 45.3 kg (99 lb 13.9 oz)   01/20/24 0414 41.9 kg (92 lb 6 oz)   01/19/24 0500 42.2 kg (93 lb 0.6 oz)   01/18/24 1535 42.2 kg (93 lb)   01/18/24 1235 42.2 kg (93 lb)   Admit Weight: 42.2 kg (93 lb) (01/18/24 1235), Weight Method: Bed Scale    Estimated Needs    Weight Used For Calorie Calculations: 42 kg (92 lb 9.5 oz)  Energy Calorie Requirements (kcal): 1200 kcasl/day (30 kcals/kg/CBW).  Energy Need Method: " Kcal/kg  Weight Used For Protein Calculations: 42 kg (92 lb 9.5 oz)  Protein Requirements: 50 to 65 g/day (1.2 to 1.5 g/kg/CBW).  Fluid Requirements (mL): 1250 mL/day (1mL/kcal) or per MD Guidance.  Temp (24hrs), Av °F (37.2 °C), Min:97.7 °F (36.5 °C), Max:99.7 °F (37.6 °C)       Enteral Nutrition    Patient not receiving enteral nutrition at this time.    Parenteral Nutrition    Patient not receiving parenteral nutrition support at this time.    Evaluation of Received Nutrient Intake    Calories: 50% intake so far.   Protein: 50% intake so far.     Patient Education    Not applicable.    Nutrition Diagnosis     PES: Altered nutrition-related laboratory values related to  Current condition as evidenced by K+ 3.4. (active)    Interventions/Goals     Intervention(s): commercial beverage and collaboration with other providers    Goal: Maintain weight throughout hospitalization. (new)    Monitoring & Evaluation     Dietitian will monitor food and beverage intake, energy intake, weight, weight change, electrolyte/renal panel, glucose/endocrine profile, and gastrointestinal profile.    Nutrition Risk/Follow-Up: moderate (follow-up in 3-5 days)   Please consult if re-assessment needed sooner.

## 2024-01-25 NOTE — PLAN OF CARE
Dr. Samaniego spoke to me this morning.  He says that pt had an MI last night.  The plan has changed that [facility] home w/ hosp[facility] Monday.

## 2024-01-26 LAB
ALBUMIN SERPL-MCNC: 2.2 G/DL (ref 3.4–4.8)
ALBUMIN/GLOB SERPL: 0.7 RATIO (ref 1.1–2)
ALP SERPL-CCNC: 70 UNIT/L (ref 40–150)
ALT SERPL-CCNC: 31 UNIT/L (ref 0–55)
APPEARANCE UR: ABNORMAL
AST SERPL-CCNC: 41 UNIT/L (ref 5–34)
BACTERIA #/AREA URNS AUTO: NORMAL /HPF
BASOPHILS # BLD AUTO: 0.07 X10(3)/MCL
BASOPHILS NFR BLD AUTO: 0.8 %
BILIRUB SERPL-MCNC: 0.3 MG/DL
BILIRUB UR QL STRIP.AUTO: NEGATIVE
BUN SERPL-MCNC: 13 MG/DL (ref 9.8–20.1)
CALCIUM SERPL-MCNC: 8.1 MG/DL (ref 8.4–10.2)
CHLORIDE SERPL-SCNC: 110 MMOL/L (ref 98–111)
CO2 SERPL-SCNC: 20 MMOL/L (ref 23–31)
COLOR UR AUTO: YELLOW
CORTIS SERPL-SCNC: 14.3 UG/DL
CREAT SERPL-MCNC: 0.46 MG/DL (ref 0.55–1.02)
EOSINOPHIL # BLD AUTO: 0.24 X10(3)/MCL (ref 0–0.9)
EOSINOPHIL NFR BLD AUTO: 2.6 %
ERYTHROCYTE [DISTWIDTH] IN BLOOD BY AUTOMATED COUNT: 13.3 % (ref 11.5–17)
GFR SERPLBLD CREATININE-BSD FMLA CKD-EPI: >60 MLS/MIN/1.73/M2
GLOBULIN SER-MCNC: 3 GM/DL (ref 2.4–3.5)
GLUCOSE SERPL-MCNC: 113 MG/DL (ref 75–121)
GLUCOSE UR QL STRIP.AUTO: NEGATIVE
HCT VFR BLD AUTO: 31.1 % (ref 37–47)
HGB BLD-MCNC: 10.4 G/DL (ref 12–16)
IMM GRANULOCYTES # BLD AUTO: 0.1 X10(3)/MCL (ref 0–0.04)
IMM GRANULOCYTES NFR BLD AUTO: 1.1 %
KETONES UR QL STRIP.AUTO: NEGATIVE
LEUKOCYTE ESTERASE UR QL STRIP.AUTO: NEGATIVE
LYMPHOCYTES # BLD AUTO: 1.02 X10(3)/MCL (ref 0.6–4.6)
LYMPHOCYTES NFR BLD AUTO: 11 %
MCH RBC QN AUTO: 31.8 PG (ref 27–31)
MCHC RBC AUTO-ENTMCNC: 33.4 G/DL (ref 33–36)
MCV RBC AUTO: 95.1 FL (ref 80–94)
MONOCYTES # BLD AUTO: 0.74 X10(3)/MCL (ref 0.1–1.3)
MONOCYTES NFR BLD AUTO: 8 %
NEUTROPHILS # BLD AUTO: 7.11 X10(3)/MCL (ref 2.1–9.2)
NEUTROPHILS NFR BLD AUTO: 76.5 %
NITRITE UR QL STRIP.AUTO: NEGATIVE
PH UR STRIP.AUTO: 6 [PH]
PLATELET # BLD AUTO: 261 X10(3)/MCL (ref 130–400)
PMV BLD AUTO: 9.5 FL (ref 7.4–10.4)
POTASSIUM SERPL-SCNC: 3.4 MMOL/L (ref 3.5–5.1)
PROT SERPL-MCNC: 5.2 GM/DL (ref 5.8–7.6)
PROT UR QL STRIP.AUTO: NEGATIVE
RBC # BLD AUTO: 3.27 X10(6)/MCL (ref 4.2–5.4)
RBC #/AREA URNS AUTO: NORMAL /HPF
RBC UR QL AUTO: ABNORMAL
SODIUM SERPL-SCNC: 136 MMOL/L (ref 132–146)
SODIUM SERPL-SCNC: 137 MMOL/L (ref 132–146)
SODIUM SERPL-SCNC: 137 MMOL/L (ref 132–146)
SP GR UR STRIP.AUTO: 1.02 (ref 1–1.03)
SQUAMOUS #/AREA URNS AUTO: NORMAL /HPF
UROBILINOGEN UR STRIP-ACNC: 0.2
WBC # SPEC AUTO: 9.28 X10(3)/MCL (ref 4.5–11.5)
WBC #/AREA URNS AUTO: NORMAL /HPF

## 2024-01-26 PROCEDURE — 21400001 HC TELEMETRY ROOM

## 2024-01-26 PROCEDURE — A4216 STERILE WATER/SALINE, 10 ML: HCPCS | Performed by: INTERNAL MEDICINE

## 2024-01-26 PROCEDURE — 82533 TOTAL CORTISOL: CPT | Performed by: INTERNAL MEDICINE

## 2024-01-26 PROCEDURE — 85025 COMPLETE CBC W/AUTO DIFF WBC: CPT | Performed by: FAMILY MEDICINE

## 2024-01-26 PROCEDURE — 63600175 PHARM REV CODE 636 W HCPCS: Performed by: INTERNAL MEDICINE

## 2024-01-26 PROCEDURE — 25000003 PHARM REV CODE 250: Performed by: INTERNAL MEDICINE

## 2024-01-26 PROCEDURE — 25000003 PHARM REV CODE 250: Performed by: FAMILY MEDICINE

## 2024-01-26 PROCEDURE — 94761 N-INVAS EAR/PLS OXIMETRY MLT: CPT

## 2024-01-26 PROCEDURE — 84295 ASSAY OF SERUM SODIUM: CPT | Performed by: INTERNAL MEDICINE

## 2024-01-26 PROCEDURE — 81003 URINALYSIS AUTO W/O SCOPE: CPT | Performed by: INTERNAL MEDICINE

## 2024-01-26 PROCEDURE — 27000207 HC ISOLATION

## 2024-01-26 PROCEDURE — 80053 COMPREHEN METABOLIC PANEL: CPT | Performed by: FAMILY MEDICINE

## 2024-01-26 RX ORDER — POTASSIUM CHLORIDE 20 MEQ/1
20 TABLET, EXTENDED RELEASE ORAL 2 TIMES DAILY
Status: DISCONTINUED | OUTPATIENT
Start: 2024-01-26 | End: 2024-01-27

## 2024-01-26 RX ADMIN — ATORVASTATIN CALCIUM 40 MG: 40 TABLET, FILM COATED ORAL at 08:01

## 2024-01-26 RX ADMIN — ACETAMINOPHEN 650 MG: 325 TABLET, FILM COATED ORAL at 05:01

## 2024-01-26 RX ADMIN — CEFTRIAXONE SODIUM 1 G: 1 INJECTION, POWDER, FOR SOLUTION INTRAMUSCULAR; INTRAVENOUS at 05:01

## 2024-01-26 RX ADMIN — GABAPENTIN 300 MG: 300 CAPSULE ORAL at 08:01

## 2024-01-26 RX ADMIN — ACETAMINOPHEN 650 MG: 325 TABLET, FILM COATED ORAL at 03:01

## 2024-01-26 RX ADMIN — POTASSIUM CHLORIDE 20 MEQ: 1500 TABLET, EXTENDED RELEASE ORAL at 08:01

## 2024-01-26 RX ADMIN — SODIUM CHLORIDE, PRESERVATIVE FREE 10 ML: 5 INJECTION INTRAVENOUS at 05:01

## 2024-01-26 RX ADMIN — AMLODIPINE BESYLATE 5 MG: 5 TABLET ORAL at 08:01

## 2024-01-26 RX ADMIN — SODIUM CHLORIDE 25 ML/HR: 3 INJECTION, SOLUTION INTRAVENOUS at 09:01

## 2024-01-26 RX ADMIN — ACETAMINOPHEN 650 MG: 325 TABLET, FILM COATED ORAL at 08:01

## 2024-01-26 RX ADMIN — SODIUM CHLORIDE TAB 1 GM 1000 MG: 1 TAB at 08:01

## 2024-01-27 LAB
ALBUMIN SERPL-MCNC: 2.4 G/DL (ref 3.4–4.8)
ALBUMIN/GLOB SERPL: 0.7 RATIO (ref 1.1–2)
ALP SERPL-CCNC: 89 UNIT/L (ref 40–150)
ALT SERPL-CCNC: 43 UNIT/L (ref 0–55)
AST SERPL-CCNC: 46 UNIT/L (ref 5–34)
BASOPHILS # BLD AUTO: 0.08 X10(3)/MCL
BASOPHILS NFR BLD AUTO: 0.9 %
BILIRUB SERPL-MCNC: 0.3 MG/DL
BUN SERPL-MCNC: 9 MG/DL (ref 9.8–20.1)
CALCIUM SERPL-MCNC: 8.4 MG/DL (ref 8.4–10.2)
CHLORIDE SERPL-SCNC: 108 MMOL/L (ref 98–111)
CO2 SERPL-SCNC: 22 MMOL/L (ref 23–31)
CREAT SERPL-MCNC: 0.47 MG/DL (ref 0.55–1.02)
EOSINOPHIL # BLD AUTO: 0.25 X10(3)/MCL (ref 0–0.9)
EOSINOPHIL NFR BLD AUTO: 2.9 %
ERYTHROCYTE [DISTWIDTH] IN BLOOD BY AUTOMATED COUNT: 13.4 % (ref 11.5–17)
GFR SERPLBLD CREATININE-BSD FMLA CKD-EPI: >60 MLS/MIN/1.73/M2
GLOBULIN SER-MCNC: 3.3 GM/DL (ref 2.4–3.5)
GLUCOSE SERPL-MCNC: 123 MG/DL (ref 75–121)
HCT VFR BLD AUTO: 34.3 % (ref 37–47)
HGB BLD-MCNC: 11.5 G/DL (ref 12–16)
IMM GRANULOCYTES # BLD AUTO: 0.13 X10(3)/MCL (ref 0–0.04)
IMM GRANULOCYTES NFR BLD AUTO: 1.5 %
LYMPHOCYTES # BLD AUTO: 1.02 X10(3)/MCL (ref 0.6–4.6)
LYMPHOCYTES NFR BLD AUTO: 11.7 %
MAGNESIUM SERPL-MCNC: 1.5 MG/DL (ref 1.6–2.6)
MCH RBC QN AUTO: 31.3 PG (ref 27–31)
MCHC RBC AUTO-ENTMCNC: 33.5 G/DL (ref 33–36)
MCV RBC AUTO: 93.2 FL (ref 80–94)
MONOCYTES # BLD AUTO: 0.51 X10(3)/MCL (ref 0.1–1.3)
MONOCYTES NFR BLD AUTO: 5.8 %
NEUTROPHILS # BLD AUTO: 6.75 X10(3)/MCL (ref 2.1–9.2)
NEUTROPHILS NFR BLD AUTO: 77.2 %
PHOSPHATE SERPL-MCNC: 2.7 MG/DL (ref 2.3–4.7)
PLATELET # BLD AUTO: 341 X10(3)/MCL (ref 130–400)
PMV BLD AUTO: 9.2 FL (ref 7.4–10.4)
POTASSIUM SERPL-SCNC: 3.6 MMOL/L (ref 3.5–5.1)
PROT SERPL-MCNC: 5.7 GM/DL (ref 5.8–7.6)
RBC # BLD AUTO: 3.68 X10(6)/MCL (ref 4.2–5.4)
SODIUM SERPL-SCNC: 138 MMOL/L (ref 132–146)
SODIUM SERPL-SCNC: 139 MMOL/L (ref 132–146)
WBC # SPEC AUTO: 8.74 X10(3)/MCL (ref 4.5–11.5)

## 2024-01-27 PROCEDURE — 94761 N-INVAS EAR/PLS OXIMETRY MLT: CPT

## 2024-01-27 PROCEDURE — 63600175 PHARM REV CODE 636 W HCPCS: Performed by: INTERNAL MEDICINE

## 2024-01-27 PROCEDURE — 85025 COMPLETE CBC W/AUTO DIFF WBC: CPT | Performed by: FAMILY MEDICINE

## 2024-01-27 PROCEDURE — 25000003 PHARM REV CODE 250: Performed by: FAMILY MEDICINE

## 2024-01-27 PROCEDURE — 84295 ASSAY OF SERUM SODIUM: CPT | Performed by: INTERNAL MEDICINE

## 2024-01-27 PROCEDURE — 21400001 HC TELEMETRY ROOM

## 2024-01-27 PROCEDURE — A4216 STERILE WATER/SALINE, 10 ML: HCPCS | Performed by: INTERNAL MEDICINE

## 2024-01-27 PROCEDURE — 27000207 HC ISOLATION

## 2024-01-27 PROCEDURE — 84100 ASSAY OF PHOSPHORUS: CPT | Performed by: FAMILY MEDICINE

## 2024-01-27 PROCEDURE — 80053 COMPREHEN METABOLIC PANEL: CPT | Performed by: FAMILY MEDICINE

## 2024-01-27 PROCEDURE — 25000003 PHARM REV CODE 250: Performed by: INTERNAL MEDICINE

## 2024-01-27 PROCEDURE — 83735 ASSAY OF MAGNESIUM: CPT | Performed by: FAMILY MEDICINE

## 2024-01-27 RX ORDER — POTASSIUM CHLORIDE 20 MEQ/1
40 TABLET, EXTENDED RELEASE ORAL 2 TIMES DAILY
Status: DISCONTINUED | OUTPATIENT
Start: 2024-01-27 | End: 2024-01-30

## 2024-01-27 RX ADMIN — POTASSIUM CHLORIDE 40 MEQ: 1500 TABLET, EXTENDED RELEASE ORAL at 08:01

## 2024-01-27 RX ADMIN — ACETAMINOPHEN 650 MG: 325 TABLET, FILM COATED ORAL at 03:01

## 2024-01-27 RX ADMIN — SODIUM CHLORIDE 25 ML/HR: 3 INJECTION, SOLUTION INTRAVENOUS at 11:01

## 2024-01-27 RX ADMIN — CEFTRIAXONE SODIUM 1 G: 1 INJECTION, POWDER, FOR SOLUTION INTRAMUSCULAR; INTRAVENOUS at 06:01

## 2024-01-27 RX ADMIN — GABAPENTIN 300 MG: 300 CAPSULE ORAL at 08:01

## 2024-01-27 RX ADMIN — ACETAMINOPHEN 650 MG: 325 TABLET, FILM COATED ORAL at 08:01

## 2024-01-27 RX ADMIN — SODIUM CHLORIDE, PRESERVATIVE FREE 10 ML: 5 INJECTION INTRAVENOUS at 11:01

## 2024-01-27 RX ADMIN — SODIUM CHLORIDE, PRESERVATIVE FREE 10 ML: 5 INJECTION INTRAVENOUS at 08:01

## 2024-01-27 RX ADMIN — SODIUM CHLORIDE, PRESERVATIVE FREE 10 ML: 5 INJECTION INTRAVENOUS at 06:01

## 2024-01-27 RX ADMIN — SODIUM CHLORIDE 25 ML/HR: 3 INJECTION, SOLUTION INTRAVENOUS at 01:01

## 2024-01-27 RX ADMIN — AMLODIPINE BESYLATE 5 MG: 5 TABLET ORAL at 08:01

## 2024-01-27 RX ADMIN — POLYETHYLENE GLYCOL 3350 17 G: 17 POWDER, FOR SOLUTION ORAL at 08:01

## 2024-01-27 RX ADMIN — POTASSIUM CHLORIDE 40 MEQ: 1500 TABLET, EXTENDED RELEASE ORAL at 09:01

## 2024-01-27 RX ADMIN — DOCUSATE SODIUM LIQUID 50 MG: 100 LIQUID ORAL at 08:01

## 2024-01-27 RX ADMIN — ATORVASTATIN CALCIUM 40 MG: 40 TABLET, FILM COATED ORAL at 08:01

## 2024-01-27 RX ADMIN — BISACODYL 10 MG: 10 SUPPOSITORY RECTAL at 08:01

## 2024-01-27 RX ADMIN — DOCUSATE SODIUM LIQUID 50 MG: 100 LIQUID ORAL at 09:01

## 2024-01-27 RX ADMIN — SODIUM CHLORIDE TAB 1 GM 1000 MG: 1 TAB at 09:01

## 2024-01-27 RX ADMIN — SODIUM CHLORIDE TAB 1 GM 1000 MG: 1 TAB at 08:01

## 2024-01-27 RX ADMIN — GABAPENTIN 300 MG: 300 CAPSULE ORAL at 09:01

## 2024-01-27 NOTE — PROGRESS NOTES
Progress Note    Admit Date: 1/18/2024   LOS: 8 days     SUBJECTIVE:     Follow-up For:    She is  very  oriented.  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   no family members     Scheduled Meds:   acetaminophen  650 mg Oral Q8H WA    amLODIPine  5 mg Oral Daily    atorvastatin  40 mg Oral Daily    bisacodyL  10 mg Rectal Daily    cefTRIAXone (Rocephin) IV (PEDS and ADULTS)  1 g Intravenous Q12H    dexAMETHasone  4 mg Intravenous Once    docusate  50 mg Oral BID    gabapentin  300 mg Oral BID    polyethylene glycol  17 g Oral Daily    potassium chloride  20 mEq Oral BID    sodium chloride 0.9%  10 mL Intravenous Q6H    sodium chloride  1,000 mg Oral BID     Continuous Infusions:   sodium chloride 3% HYPERTONIC 25 mL/hr (01/26/24 0902)     PRN Meds:albuterol-ipratropium, bisacodyL, hydrALAZINE, sodium chloride 0.9%, Flushing PICC/Midline Protocol **AND** sodium chloride 0.9% **AND** sodium chloride 0.9%    Review of patient's allergies indicates:  No Known Allergies    Review of Systems  ROS    OBJECTIVE:     Vital Signs (Most Recent)  Temp: 98 °F (36.7 °C) (01/26/24 1627)  Pulse: 85 (01/26/24 1627)  Resp: 20 (01/26/24 0700)  BP: 137/87 (01/26/24 1627)  SpO2: 95 % (01/26/24 1627)    Vital Signs Range (Last 24H):  Temp:  [97.7 °F (36.5 °C)-98.7 °F (37.1 °C)]   Pulse:  [85-88]   Resp:  [20]   BP: (117-139)/(68-87)   SpO2:  [92 %-95 %]     I & O (Last 24H):  Intake/Output Summary (Last 24 hours) at 1/26/2024 1856  Last data filed at 1/26/2024 1843  Gross per 24 hour   Intake 360 ml   Output 1250 ml   Net -890 ml     Physical Exam:  Physical Exam   Vitals:    01/26/24 1627   BP: 137/87   Pulse: 85   Resp:    Temp: 98 °F (36.7 °C)       Physical Exam   Constitutional: alert & oriented, no acute distress  She is  tired    Able to  drink  from a  straw   HENT: lying  in bed   Head: Normocephalic and staples   Eyes: Conjunctivae normal and EOM are normal. Pupils are  equal, round, and reactive to light.  She is  able to  follow commands  Neck: Normal range of motion. Neck supple.   Cardiovascular: Normal rate, regular rhythm, normal heart sounds   Pulmonary/Chest: CTAB, nonlabored respiration  Abdominal: Soft, nontender, bowel sounds normal  Neurological: nonfocal  Skin: warm, dry intact  Psychiatric: normal mood and affect, cooperative  No edema          Laboratory:  Recent Results (from the past 24 hour(s))   Sodium    Collection Time: 01/25/24  8:09 PM   Result Value Ref Range    Sodium Level 136 132 - 146 mmol/L   Urinalysis, Reflex to Urine Culture    Collection Time: 01/26/24  3:08 AM    Specimen: Urine   Result Value Ref Range    Color, UA Yellow Yellow, Light-Yellow, Dark Yellow, Cor[place], Straw    Appearance, UA Slightly Cloudy (A) Clear    Specific Gravity, UA 1.020 1.005 - 1.030    pH, UA 6.0 5.0 - 8.5    Protein, UA Negative Negative    Glucose, UA Negative Negative, Normal    Ketones, UA Negative Negative    Blood, UA Trace-Intact (A) Negative    Bilirubin, UA Negative Negative    Urobilinogen, UA 0.2 0.2, 1.0, Normal    Nitrites, UA Negative Negative    Leukocyte Esterase, UA Negative Negative   Urinalysis, Microscopic    Collection Time: 01/26/24  3:08 AM   Result Value Ref Range    Bacteria, UA Rare None Seen, Rare, Occasional /HPF    RBC, UA 0-5 None Seen, 0-2, 3-5, 0-5 /HPF    WBC, UA 0-2 None Seen, 0-2, 3-5, 0-5 /HPF    Squamous Epithelial Cells, UA None Seen None Seen, Rare, Occasional, Occ /HPF   Comprehensive Metabolic Panel    Collection Time: 01/26/24  6:50 AM   Result Value Ref Range    Sodium Level 137 132 - 146 mmol/L    Potassium Level 3.4 (L) 3.5 - 5.1 mmol/L    Chloride 110 98 - 111 mmol/L    Carbon Dioxide 20 (L) 23 - 31 mmol/L    Glucose Level 113 75 - 121 mg/dL    Blood Urea Nitrogen 13.0 9.8 - 20.1 mg/dL    Creatinine 0.46 (L) 0.55 - 1.02 mg/dL    Calcium Level Total 8.1 (L) 8.4 - 10.2 mg/dL    Protein Total 5.2 (L) 5.8 - 7.6 gm/dL    Albumin Level  2.2 (L) 3.4 - 4.8 g/dL    Globulin 3.0 2.4 - 3.5 gm/dL    Albumin/Globulin Ratio 0.7 (L) 1.1 - 2.0 ratio    Bilirubin Total 0.3 <=1.5 mg/dL    Alkaline Phosphatase 70 40 - 150 unit/L    Alanine Aminotransferase 31 0 - 55 unit/L    Aspartate Aminotransferase 41 (H) 5 - 34 unit/L    eGFR >60 mls/min/1.73/m2   CBC with Differential    Collection Time: 01/26/24  6:50 AM   Result Value Ref Range    WBC 9.28 4.50 - 11.50 x10(3)/mcL    RBC 3.27 (L) 4.20 - 5.40 x10(6)/mcL    Hgb 10.4 (L) 12.0 - 16.0 g/dL    Hct 31.1 (L) 37.0 - 47.0 %    MCV 95.1 (H) 80.0 - 94.0 fL    MCH 31.8 (H) 27.0 - 31.0 pg    MCHC 33.4 33.0 - 36.0 g/dL    RDW 13.3 11.5 - 17.0 %    Platelet 261 130 - 400 x10(3)/mcL    MPV 9.5 7.4 - 10.4 fL    Neut % 76.5 %    Lymph % 11.0 %    Mono % 8.0 %    Eos % 2.6 %    Basophil % 0.8 %    Lymph # 1.02 0.6 - 4.6 x10(3)/mcL    Neut # 7.11 2.1 - 9.2 x10(3)/mcL    Mono # 0.74 0.1 - 1.3 x10(3)/mcL    Eos # 0.24 0 - 0.9 x10(3)/mcL    Baso # 0.07 <=0.2 x10(3)/mcL    IG# 0.10 (H) 0 - 0.04 x10(3)/mcL    IG% 1.1 %   Sodium    Collection Time: 01/26/24  8:03 AM   Result Value Ref Range    Sodium Level 137 132 - 146 mmol/L   Cortisol    Collection Time: 01/26/24  8:03 AM   Result Value Ref Range    Cortisol Level 14.3 ug/dL        Diagnostic Results:  X-Ray Chest 1 View for Line/Tube Placement    Result Date: 1/20/2024  EXAMINATION: XR CHEST 1 VIEW FOR LINE/TUBE PLACEMENT CLINICAL HISTORY: Status post central venous catheter placement. TECHNIQUE: Single frontal portable view of the chest was performed. COMPARISON: Earlier same day. FINDINGS: There is interval placement a right extremity PICC central venous catheter with catheter tip overlying the distal SVC versus SVC/RA junction.  There is no pneumothorax.  There is slight interval increase in alveolar opacities in left lung base, trace left effusion cannot be excluded.  No suspicious interstitial or alveolar opacities in the right lung, there is no right effusion.  There is  similar cardiomegaly.  The central pulmonary vessels and mediastinum are normal in size.     1. Interval placement right upper extremity PICC central venous catheter, as above, with no pneumothorax identified. 2. Interval increase in alveolar opacities in the left lung base.  A trace effusion cannot be excluded. 3. Similar cardiomegaly. Electronically signed by: Genaro Herzog MD Date:    01/20/2024 Time:    09:19    CT Head Without Contrast    Result Date: 1/19/2024  EXAMINATION: CT HEAD WITHOUT CONTRAST CLINICAL HISTORY: Subdural hemorrhage;, . TECHNIQUE: PATIENT RADIATION DOSE: DLP(mGycm) 988 As per PQRS measures, all CT scans at this facility used dose modulation, iterative reconstruction, and/or weight based dose adjustment when appropriate to reduce radiation dose to as low as reasonably achievable. COMPARISON: 01/18/2024 FINDINGS: Serial axial images were obtained of the head without the administration of IV contrast. Both brain and bone parenchymal windows were obtained.  Additional coronal and sagittal reconstructions were obtained.  The right subdural hematoma is less lobulated in appearance since the prior exam without the epidural characteristics.  There is interim increase in density and size of the sub dural hematoma along the right cerebral convexity, compatible with acute component.  There is interim increase in size of the small parenchymal hemorrhage at the right frontal lobe since the prior exam now measuring up to 2.5 cm in greatest diameter.  Small parenchymal hemorrhages at the posterior right parietal lobe are similar in appearance.  Parenchymal defect is again evident at the right the temple parietal lobe compatible with old infarct/encephalomalacia.  The hematoma measures approximately 1.5 cm in greatest thickness.  There is now diffuse sulcal effacement along the right cerebral hemisphere with slight midline shift to the left estimated at 4-5 mm.  Intracranial atherosclerosis seen.   Scattered hypodensities are noted to the periventricular white matter.  Paranasal sinuses and mastoid air cells are relatively clear.  External auditory canals are grossly patent.  There is interim resolution of previous soft tissue emphysema at the posterior left scalp with overlying staples.     1. Interim changes in configuration of previous right subdural hematoma with decreased lobulation and increased opacification of the right cerebral convexity measuring up to 1.5 cm in thickness.  There is overall increase in density compatible with the increase in acute hemorrhagic components.  There is interim development of diffuse mass effect/diffuse sulcal effacement along the right cerebral convexity and slight midline shift to the left estimated at 4-5 mm. 2. Interim increase in size of small parenchymal hemorrhage at the anterior right frontal lobe since the prior exam measuring up to 2.5 cm 3. Small parenchymal hemorrhages at the posterior right parietal lobe are similar in size 4. Generalized cerebral and cerebellar atrophy 5. Intracranial atherosclerosis 6. Interim resolution of scalp emphysema/scalp hematoma overlying the left occipital bone with overlying staples 7. The referring physician was notified of the findings on 01/19/2024 at 16:15. Electronically signed by: Phill Giles Date:    01/19/2024 Time:    16:24    X-Ray Chest 1 View    Result Date: 1/19/2024  EXAMINATION: XR CHEST 1 VIEW CLINICAL HISTORY: Covid +;, . COMPARISON: 01/02/2024 FINDINGS: An AP view or more reveals the heart to be borderline enlarged.  The trachea is midline.  Atherosclerosis is seen within the aorta.  There is interim improved aeration of the lower lungs bilaterally.  Degenerative changes and mild curvature are noted to the thoracic spine.  Bony structures are osteopenic.     1. Improved aeration of the lower lungs bilaterally with minimal residual infiltrate and or atelectasis left lung base 2. Borderline cardiomegaly 3.  Atherosclerosis 4. Thoracic spondylosis, scoliosis, and osteopenia Electronically signed by: Phill Giles Date:    01/19/2024 Time:    11:23    CT Chest Without Contrast    Result Date: 1/18/2024  EXAMINATION: CT CHEST WITHOUT CONTRAST CLINICAL HISTORY: FALL;, . TECHNIQUE: PATIENT RADIATION DOSE: DLP(mGycm) 263 As per PQRS measures, all CT scans at this facility used dose modulation, iterative reconstruction, and/or weight based dose adjustment when appropriate to reduce radiation dose to as low as reasonably achievable. COMPARISON: None available FINDINGS: Degenerative changes are noted to the thoracic spine.  There is anterior wedge compression deformity at T7 without the 13 50 loss of anterior vertebral body height.  There is low-attenuation heterogeneity of the thyroid lobes bilaterally.  There is considerable gaseous distension of the upper and mid esophagus suggesting presbyesophagus measuring up the 4 cm in diameter.  Atherosclerosis is seen within the aorta and branching vessels.  There is enlargement of the ascending aorta measuring 3.6 cm in AP diameter.  The heart is normal in size.  Trace pericardial effusion is present.  A small left pleural effusion is identified with associated infiltrate and atelectasis at the left lower lung.  The airways are grossly patent.  Patchy wedge-shaped nodular foci are evident at the apices bilaterally with the right larger than the left.  There is associated bronchiectatic changes at the right apex.  Atelectasis and/or scarring is evident posterior right upper lung.  A smaller pleural reaction is noted to the posterior right lung base with minimal infiltrate and or atelectasis.     1. Considerable gas distension of the upper and mid esophagus measuring up to 4 cm in diameter suspicious for presbyesophagus 2. Small left pleural effusion with associated infiltrate and atelectasis at the left lung base 3. Smaller right basilar pleural reaction with associated infiltrate and  atelectasis 4. Nodular wedge-shaped atelectasis and or scarring at the lung apices with bronchiectasis at the right apex. 5. Atherosclerosis with prominent ascending aorta 6. Thoracic spondylosis with anterior wedge compression deformity at T7 Electronically signed by: Phill Giles Date:    01/18/2024 Time:    14:15    CT Cervical Spine Without Contrast    Result Date: 1/18/2024  EXAMINATION: CT CERVICAL SPINE WITHOUT CONTRAST CLINICAL HISTORY: , Fall; TECHNIQUE,: PATIENT RADIATION DOSE: DLP(mGycm) 1115 As per PQRS measures, all CT scans at this facility used dose modulation, iterative reconstruction, and/or weight based dose adjustment when appropriate to reduce radiation dose to as low as reasonably achievable. COMPARISON: None available FINDINGS: Serial axial images were obtained of the cervical spine with the administration of intravenous contrast.  Additional coronal and sagittal images were performed.  Both soft tissue and bone windows were obtained. Vertebral body height is grossly intact.  There is slight retrolisthesis of C5 on C6.  No fracture is seen.  There is no loss of integrity to the bony spinal canal.  Scattered small subchondral cyst formation is noted to the vertebral bodies including the odontoid process.  Disc space narrowing, osteophyte formation, and facet arthrosis are identified.  Degenerative endplate changes are evident at C5-6.  Bony structures are osteopenic.  There is multilevel mild to severe encroachment into the neural foramina and to a lesser extent the central spinal canal secondary to posterior osteophyte formation, facet arthrosis, and poorly visualized posterior disc bulge.  There is curvature of the cervical spine with the convexity to the left.  There is low-attenuation heterogeneity of the thyroid lobes.  There is a nodular wedge-shaped atelectasis and or scarring the lung apices.  There is unusual considerable gaseous distension of the upper esophagus measuring 4 cm in  diameter.  Atherosclerosis is seen within the carotid bulbs.     1. Slight retrolisthesis of C5 on C6 2. Moderate to advanced cervical spondylosis 3. Osteopenia 4. Levoconvexity of the cervical spine 5. Multilevel mild to severe encroachment into the neural foramina and to a lesser extent into the central spinal canal as described 6. Unusual considerable gaseous distension of the upper esophagus incompletely included on the exam.  CT exam of the chest would allow further evaluation. Electronically signed by: Phill Giles Date:    01/18/2024 Time:    13:42    CT Head Without Contrast    Result Date: 1/18/2024  EXAMINATION: CT HEAD WITHOUT CONTRAST CLINICAL HISTORY: Fall;, . TECHNIQUE: PATIENT RADIATION DOSE: DLP(mGycm) 111 5 As per PQRS measures, all CT scans at this facility used dose modulation, iterative reconstruction, and/or weight based dose adjustment when appropriate to reduce radiation dose to as low as reasonably achievable. COMPARISON: 12/28/2023 FINDINGS: Serial axial images were obtained of the head without the administration of IV contrast. Both brain and bone parenchymal windows were obtained.  Additional coronal and sagittal reconstructions were obtained.  Ventricles, cisterns, and sulci are prominent in size.  There is interim development of a bilobed sub dural and suspect epidural hematoma at the right lateral and posterior convexity.  The are acute and subacute components within the hematomas.  The hematoma measures up to 1.5 cm in thickness.  Scattered parenchymal hemorrhages are evident at the right posterior parietal lobe and right occipital lobe and at the anterior right frontal lobe.  There is again low-attenuation/parenchymal defect/encephalomalacia at the distribution of the right middle cerebral artery.  No significant midline shift is appreciated.  There is again sulcal effacement at the right parietal simple lobe.  Scattered hypodensities are again seen within the periventricular white  matter.  Intracranial atherosclerosis is seen.  Visualized portions the paranasal sinuses and mastoid air cells are relatively clear.  External auditory canals are grossly patent.  No acute fracture is seen.  There is a scalp hematoma with soft tissue emphysema overlying the left occipital bone.     1. Interim development of a lobulated sub dural and suspect epidural hematomas along the right parietal and posterior convexity containing acute and nonacute the components measuring up to 1.5 cm in thickness 2. Interim development of scattered small parenchymal hemorrhages at the posterior right parietal lobe and right occipital lobe as well as a small bleed at the anterior right frontal lobe 3. Parenchymal defect/old infarct/encephalomalacia in the distribution of the right middle cerebral artery 4. Generalized cerebral and cerebellar atrophy 5. Intracranial atherosclerosis 6. Scattered hypodensities at the periventricular white matter suspicious for small vessel ischemic changes 7. Interim development of a scalp normal and soft tissue emphysema overlying the left occipital 8. The emergency department physician was notified of the findings at 13:10. 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 She  seems to have  come  back to  her  reported baseline.   She is   very  concerned  about her  sleep   She  has  scheduled  tylenol for  pain   She  had  some status  changes  w  tramadol.   She is  requesting   melatonin  this  can  contrib  to   low  na   She is on  hot  salt  for low   sodium  dr aleman  would like her  na to  be >140   for  24-28 hour prior to  stopping it .   Her cortisol  was normal   Possible move to  SNF on Monday

## 2024-01-27 NOTE — PROGRESS NOTES
.Progress Note    Admit Date: 1/18/2024   LOS: 9 days     SUBJECTIVE:     Follow-up For:    She is  very  oriented.  She slept  better  last night   The   aid is  feeding her  breakfast     Scheduled Meds:   acetaminophen  650 mg Oral Q8H WA    amLODIPine  5 mg Oral Daily    atorvastatin  40 mg Oral Daily    bisacodyL  10 mg Rectal Daily    cefTRIAXone (Rocephin) IV (PEDS and ADULTS)  1 g Intravenous Q12H    dexAMETHasone  4 mg Intravenous Once    docusate  50 mg Oral BID    gabapentin  300 mg Oral BID    polyethylene glycol  17 g Oral Daily    potassium chloride  40 mEq Oral BID    sodium chloride 0.9%  10 mL Intravenous Q6H    sodium chloride  1,000 mg Oral BID     Continuous Infusions:   sodium chloride 3% HYPERTONIC 25 mL/hr (01/27/24 0112)     PRN Meds:albuterol-ipratropium, bisacodyL, hydrALAZINE, sodium chloride 0.9%, Flushing PICC/Midline Protocol **AND** sodium chloride 0.9% **AND** sodium chloride 0.9%    Review of patient's allergies indicates:  No Known Allergies    Review of Systems  ROS    OBJECTIVE:     Vital Signs (Most Recent)  Temp: 97.7 °F (36.5 °C) (01/27/24 1221)  Pulse: 93 (01/27/24 1221)  Resp: 18 (01/27/24 1221)  BP: 138/81 (01/27/24 1221)  SpO2: (!) 93 % (01/27/24 1221)    Vital Signs Range (Last 24H):  Temp:  [97.2 °F (36.2 °C)-98.2 °F (36.8 °C)]   Pulse:  [85-93]   Resp:  [18-20]   BP: (131-152)/(74-88)   SpO2:  [92 %-97 %]     I & O (Last 24H):  Intake/Output Summary (Last 24 hours) at 1/27/2024 1306  Last data filed at 1/27/2024 0800  Gross per 24 hour   Intake 2070.61 ml   Output 300 ml   Net 1770.61 ml       Physical Exam:  Physical Exam   Vitals:    01/27/24 1221   BP: 138/81   Pulse: 93   Resp: 18   Temp: 97.7 °F (36.5 °C)       Physical Exam   Constitutional: alert & oriented, no acute distress  She is  tired    Able to  drink  from a  straw   HENT: lying  in bed   Head: Normocephalic and staples   Eyes: Conjunctivae normal and EOM are normal. Pupils are equal, round, and reactive  to light.  She is  able to  follow commands  Neck: Normal range of motion. Neck supple.   Cardiovascular: Normal rate, regular rhythm, normal heart sounds   Pulmonary/Chest: CTAB, nonlabored respiration  Abdominal: Soft, nontender, bowel sounds normal  Neurological: nonfocal  Skin: warm, dry intact  Psychiatric: normal mood and affect, cooperative  No edema          Laboratory:  Recent Results (from the past 24 hour(s))   Sodium    Collection Time: 01/26/24  8:10 PM   Result Value Ref Range    Sodium Level 136 132 - 146 mmol/L   Comprehensive Metabolic Panel    Collection Time: 01/27/24  8:14 AM   Result Value Ref Range    Sodium Level 139 132 - 146 mmol/L    Potassium Level 3.6 3.5 - 5.1 mmol/L    Chloride 108 98 - 111 mmol/L    Carbon Dioxide 22 (L) 23 - 31 mmol/L    Glucose Level 123 (H) 75 - 121 mg/dL    Blood Urea Nitrogen 9.0 (L) 9.8 - 20.1 mg/dL    Creatinine 0.47 (L) 0.55 - 1.02 mg/dL    Calcium Level Total 8.4 8.4 - 10.2 mg/dL    Protein Total 5.7 (L) 5.8 - 7.6 gm/dL    Albumin Level 2.4 (L) 3.4 - 4.8 g/dL    Globulin 3.3 2.4 - 3.5 gm/dL    Albumin/Globulin Ratio 0.7 (L) 1.1 - 2.0 ratio    Bilirubin Total 0.3 <=1.5 mg/dL    Alkaline Phosphatase 89 40 - 150 unit/L    Alanine Aminotransferase 43 0 - 55 unit/L    Aspartate Aminotransferase 46 (H) 5 - 34 unit/L    eGFR >60 mls/min/1.73/m2   Magnesium    Collection Time: 01/27/24  8:14 AM   Result Value Ref Range    Magnesium Level 1.50 (L) 1.60 - 2.60 mg/dL   Phosphorus    Collection Time: 01/27/24  8:14 AM   Result Value Ref Range    Phosphorus Level 2.7 2.3 - 4.7 mg/dL   CBC with Differential    Collection Time: 01/27/24  8:14 AM   Result Value Ref Range    WBC 8.74 4.50 - 11.50 x10(3)/mcL    RBC 3.68 (L) 4.20 - 5.40 x10(6)/mcL    Hgb 11.5 (L) 12.0 - 16.0 g/dL    Hct 34.3 (L) 37.0 - 47.0 %    MCV 93.2 80.0 - 94.0 fL    MCH 31.3 (H) 27.0 - 31.0 pg    MCHC 33.5 33.0 - 36.0 g/dL    RDW 13.4 11.5 - 17.0 %    Platelet 341 130 - 400 x10(3)/mcL    MPV 9.2 7.4 - 10.4  fL    Neut % 77.2 %    Lymph % 11.7 %    Mono % 5.8 %    Eos % 2.9 %    Basophil % 0.9 %    Lymph # 1.02 0.6 - 4.6 x10(3)/mcL    Neut # 6.75 2.1 - 9.2 x10(3)/mcL    Mono # 0.51 0.1 - 1.3 x10(3)/mcL    Eos # 0.25 0 - 0.9 x10(3)/mcL    Baso # 0.08 <=0.2 x10(3)/mcL    IG# 0.13 (H) 0 - 0.04 x10(3)/mcL    IG% 1.5 %        Diagnostic Results:  X-Ray Chest 1 View for Line/Tube Placement    Result Date: 1/20/2024  EXAMINATION: XR CHEST 1 VIEW FOR LINE/TUBE PLACEMENT CLINICAL HISTORY: Status post central venous catheter placement. TECHNIQUE: Single frontal portable view of the chest was performed. COMPARISON: Earlier same day. FINDINGS: There is interval placement a right extremity PICC central venous catheter with catheter tip overlying the distal SVC versus SVC/RA junction.  There is no pneumothorax.  There is slight interval increase in alveolar opacities in left lung base, trace left effusion cannot be excluded.  No suspicious interstitial or alveolar opacities in the right lung, there is no right effusion.  There is similar cardiomegaly.  The central pulmonary vessels and mediastinum are normal in size.     1. Interval placement right upper extremity PICC central venous catheter, as above, with no pneumothorax identified. 2. Interval increase in alveolar opacities in the left lung base.  A trace effusion cannot be excluded. 3. Similar cardiomegaly. Electronically signed by: Genaro Herzog MD Date:    01/20/2024 Time:    09:19    CT Head Without Contrast    Result Date: 1/19/2024  EXAMINATION: CT HEAD WITHOUT CONTRAST CLINICAL HISTORY: Subdural hemorrhage;, . TECHNIQUE: PATIENT RADIATION DOSE: DLP(mGycm) 988 As per PQRS measures, all CT scans at this facility used dose modulation, iterative reconstruction, and/or weight based dose adjustment when appropriate to reduce radiation dose to as low as reasonably achievable. COMPARISON: 01/18/2024 FINDINGS: Serial axial images were obtained of the head without the  administration of IV contrast. Both brain and bone parenchymal windows were obtained.  Additional coronal and sagittal reconstructions were obtained.  The right subdural hematoma is less lobulated in appearance since the prior exam without the epidural characteristics.  There is interim increase in density and size of the sub dural hematoma along the right cerebral convexity, compatible with acute component.  There is interim increase in size of the small parenchymal hemorrhage at the right frontal lobe since the prior exam now measuring up to 2.5 cm in greatest diameter.  Small parenchymal hemorrhages at the posterior right parietal lobe are similar in appearance.  Parenchymal defect is again evident at the right the temple parietal lobe compatible with old infarct/encephalomalacia.  The hematoma measures approximately 1.5 cm in greatest thickness.  There is now diffuse sulcal effacement along the right cerebral hemisphere with slight midline shift to the left estimated at 4-5 mm.  Intracranial atherosclerosis seen.  Scattered hypodensities are noted to the periventricular white matter.  Paranasal sinuses and mastoid air cells are relatively clear.  External auditory canals are grossly patent.  There is interim resolution of previous soft tissue emphysema at the posterior left scalp with overlying staples.     1. Interim changes in configuration of previous right subdural hematoma with decreased lobulation and increased opacification of the right cerebral convexity measuring up to 1.5 cm in thickness.  There is overall increase in density compatible with the increase in acute hemorrhagic components.  There is interim development of diffuse mass effect/diffuse sulcal effacement along the right cerebral convexity and slight midline shift to the left estimated at 4-5 mm. 2. Interim increase in size of small parenchymal hemorrhage at the anterior right frontal lobe since the prior exam measuring up to 2.5 cm 3. Small  parenchymal hemorrhages at the posterior right parietal lobe are similar in size 4. Generalized cerebral and cerebellar atrophy 5. Intracranial atherosclerosis 6. Interim resolution of scalp emphysema/scalp hematoma overlying the left occipital bone with overlying staples 7. The referring physician was notified of the findings on 01/19/2024 at 16:15. Electronically signed by: Phill Giles Date:    01/19/2024 Time:    16:24    X-Ray Chest 1 View    Result Date: 1/19/2024  EXAMINATION: XR CHEST 1 VIEW CLINICAL HISTORY: Covid +;, . COMPARISON: 01/02/2024 FINDINGS: An AP view or more reveals the heart to be borderline enlarged.  The trachea is midline.  Atherosclerosis is seen within the aorta.  There is interim improved aeration of the lower lungs bilaterally.  Degenerative changes and mild curvature are noted to the thoracic spine.  Bony structures are osteopenic.     1. Improved aeration of the lower lungs bilaterally with minimal residual infiltrate and or atelectasis left lung base 2. Borderline cardiomegaly 3. Atherosclerosis 4. Thoracic spondylosis, scoliosis, and osteopenia Electronically signed by: Phill Giles Date:    01/19/2024 Time:    11:23    CT Chest Without Contrast    Result Date: 1/18/2024  EXAMINATION: CT CHEST WITHOUT CONTRAST CLINICAL HISTORY: FALL;, . TECHNIQUE: PATIENT RADIATION DOSE: DLP(mGycm) 263 As per PQRS measures, all CT scans at this facility used dose modulation, iterative reconstruction, and/or weight based dose adjustment when appropriate to reduce radiation dose to as low as reasonably achievable. COMPARISON: None available FINDINGS: Degenerative changes are noted to the thoracic spine.  There is anterior wedge compression deformity at T7 without the 13 50 loss of anterior vertebral body height.  There is low-attenuation heterogeneity of the thyroid lobes bilaterally.  There is considerable gaseous distension of the upper and mid esophagus suggesting presbyesophagus measuring up the  4 cm in diameter.  Atherosclerosis is seen within the aorta and branching vessels.  There is enlargement of the ascending aorta measuring 3.6 cm in AP diameter.  The heart is normal in size.  Trace pericardial effusion is present.  A small left pleural effusion is identified with associated infiltrate and atelectasis at the left lower lung.  The airways are grossly patent.  Patchy wedge-shaped nodular foci are evident at the apices bilaterally with the right larger than the left.  There is associated bronchiectatic changes at the right apex.  Atelectasis and/or scarring is evident posterior right upper lung.  A smaller pleural reaction is noted to the posterior right lung base with minimal infiltrate and or atelectasis.     1. Considerable gas distension of the upper and mid esophagus measuring up to 4 cm in diameter suspicious for presbyesophagus 2. Small left pleural effusion with associated infiltrate and atelectasis at the left lung base 3. Smaller right basilar pleural reaction with associated infiltrate and atelectasis 4. Nodular wedge-shaped atelectasis and or scarring at the lung apices with bronchiectasis at the right apex. 5. Atherosclerosis with prominent ascending aorta 6. Thoracic spondylosis with anterior wedge compression deformity at T7 Electronically signed by: Phill Giles Date:    01/18/2024 Time:    14:15    CT Cervical Spine Without Contrast    Result Date: 1/18/2024  EXAMINATION: CT CERVICAL SPINE WITHOUT CONTRAST CLINICAL HISTORY: , Fall; TECHNIQUE,: PATIENT RADIATION DOSE: DLP(mGycm) 1115 As per PQRS measures, all CT scans at this facility used dose modulation, iterative reconstruction, and/or weight based dose adjustment when appropriate to reduce radiation dose to as low as reasonably achievable. COMPARISON: None available FINDINGS: Serial axial images were obtained of the cervical spine with the administration of intravenous contrast.  Additional coronal and sagittal images were  performed.  Both soft tissue and bone windows were obtained. Vertebral body height is grossly intact.  There is slight retrolisthesis of C5 on C6.  No fracture is seen.  There is no loss of integrity to the bony spinal canal.  Scattered small subchondral cyst formation is noted to the vertebral bodies including the odontoid process.  Disc space narrowing, osteophyte formation, and facet arthrosis are identified.  Degenerative endplate changes are evident at C5-6.  Bony structures are osteopenic.  There is multilevel mild to severe encroachment into the neural foramina and to a lesser extent the central spinal canal secondary to posterior osteophyte formation, facet arthrosis, and poorly visualized posterior disc bulge.  There is curvature of the cervical spine with the convexity to the left.  There is low-attenuation heterogeneity of the thyroid lobes.  There is a nodular wedge-shaped atelectasis and or scarring the lung apices.  There is unusual considerable gaseous distension of the upper esophagus measuring 4 cm in diameter.  Atherosclerosis is seen within the carotid bulbs.     1. Slight retrolisthesis of C5 on C6 2. Moderate to advanced cervical spondylosis 3. Osteopenia 4. Levoconvexity of the cervical spine 5. Multilevel mild to severe encroachment into the neural foramina and to a lesser extent into the central spinal canal as described 6. Unusual considerable gaseous distension of the upper esophagus incompletely included on the exam.  CT exam of the chest would allow further evaluation. Electronically signed by: Phill Giles Date:    01/18/2024 Time:    13:42    CT Head Without Contrast    Result Date: 1/18/2024  EXAMINATION: CT HEAD WITHOUT CONTRAST CLINICAL HISTORY: Fall;, . TECHNIQUE: PATIENT RADIATION DOSE: DLP(mGycm) 111 5 As per PQRS measures, all CT scans at this facility used dose modulation, iterative reconstruction, and/or weight based dose adjustment when appropriate to reduce radiation dose to  as low as reasonably achievable. COMPARISON: 12/28/2023 FINDINGS: Serial axial images were obtained of the head without the administration of IV contrast. Both brain and bone parenchymal windows were obtained.  Additional coronal and sagittal reconstructions were obtained.  Ventricles, cisterns, and sulci are prominent in size.  There is interim development of a bilobed sub dural and suspect epidural hematoma at the right lateral and posterior convexity.  The are acute and subacute components within the hematomas.  The hematoma measures up to 1.5 cm in thickness.  Scattered parenchymal hemorrhages are evident at the right posterior parietal lobe and right occipital lobe and at the anterior right frontal lobe.  There is again low-attenuation/parenchymal defect/encephalomalacia at the distribution of the right middle cerebral artery.  No significant midline shift is appreciated.  There is again sulcal effacement at the right parietal simple lobe.  Scattered hypodensities are again seen within the periventricular white matter.  Intracranial atherosclerosis is seen.  Visualized portions the paranasal sinuses and mastoid air cells are relatively clear.  External auditory canals are grossly patent.  No acute fracture is seen.  There is a scalp hematoma with soft tissue emphysema overlying the left occipital bone.     1. Interim development of a lobulated sub dural and suspect epidural hematomas along the right parietal and posterior convexity containing acute and nonacute the components measuring up to 1.5 cm in thickness 2. Interim development of scattered small parenchymal hemorrhages at the posterior right parietal lobe and right occipital lobe as well as a small bleed at the anterior right frontal lobe 3. Parenchymal defect/old infarct/encephalomalacia in the distribution of the right middle cerebral artery 4. Generalized cerebral and cerebellar atrophy 5. Intracranial atherosclerosis 6. Scattered hypodensities at the  periventricular white matter suspicious for small vessel ischemic changes 7. Interim development of a scalp normal and soft tissue emphysema overlying the left occipital 8. The emergency department physician was notified of the findings at 13:10. 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 She  seems to have  come  back to  her  reported baseline.   She is   very  concerned  about her  sleep -  she  slept  last night  without  any  medication   She  has  scheduled  tylenol for  pain   She  had  some status  changes  w  tramadol.   She is  requesting   melatonin  this  can  contrib  to   low  na   She is on  hot  salt  for low   sodium  dr aleman  would like her  na to  be >140   for  24-28 hour prior to  stopping it .   Her cortisol  was normal   Her  k  was still  low  will  increase  k     to 40  bid  and will recheck in am     Possible move to  SNF on Monday

## 2024-01-28 LAB
ALBUMIN SERPL-MCNC: 2.5 G/DL (ref 3.4–4.8)
ALBUMIN/GLOB SERPL: 0.8 RATIO (ref 1.1–2)
ALP SERPL-CCNC: 95 UNIT/L (ref 40–150)
ALT SERPL-CCNC: 47 UNIT/L (ref 0–55)
AST SERPL-CCNC: 47 UNIT/L (ref 5–34)
BASOPHILS # BLD AUTO: 0.07 X10(3)/MCL
BASOPHILS NFR BLD AUTO: 0.7 %
BILIRUB SERPL-MCNC: 0.5 MG/DL
BUN SERPL-MCNC: 8 MG/DL (ref 9.8–20.1)
CALCIUM SERPL-MCNC: 8.5 MG/DL (ref 8.4–10.2)
CHLORIDE SERPL-SCNC: 107 MMOL/L (ref 98–111)
CO2 SERPL-SCNC: 23 MMOL/L (ref 23–31)
CREAT SERPL-MCNC: 0.45 MG/DL (ref 0.55–1.02)
EOSINOPHIL # BLD AUTO: 0.14 X10(3)/MCL (ref 0–0.9)
EOSINOPHIL NFR BLD AUTO: 1.4 %
ERYTHROCYTE [DISTWIDTH] IN BLOOD BY AUTOMATED COUNT: 13.4 % (ref 11.5–17)
GFR SERPLBLD CREATININE-BSD FMLA CKD-EPI: >60 MLS/MIN/1.73/M2
GLOBULIN SER-MCNC: 3.3 GM/DL (ref 2.4–3.5)
GLUCOSE SERPL-MCNC: 132 MG/DL (ref 75–121)
HCT VFR BLD AUTO: 32.4 % (ref 37–47)
HGB BLD-MCNC: 10.8 G/DL (ref 12–16)
IMM GRANULOCYTES # BLD AUTO: 0.17 X10(3)/MCL (ref 0–0.04)
IMM GRANULOCYTES NFR BLD AUTO: 1.7 %
LYMPHOCYTES # BLD AUTO: 0.97 X10(3)/MCL (ref 0.6–4.6)
LYMPHOCYTES NFR BLD AUTO: 9.7 %
MAGNESIUM SERPL-MCNC: 1.5 MG/DL (ref 1.6–2.6)
MCH RBC QN AUTO: 31.3 PG (ref 27–31)
MCHC RBC AUTO-ENTMCNC: 33.3 G/DL (ref 33–36)
MCV RBC AUTO: 93.9 FL (ref 80–94)
MONOCYTES # BLD AUTO: 0.64 X10(3)/MCL (ref 0.1–1.3)
MONOCYTES NFR BLD AUTO: 6.4 %
NEUTROPHILS # BLD AUTO: 8.01 X10(3)/MCL (ref 2.1–9.2)
NEUTROPHILS NFR BLD AUTO: 80.1 %
PHOSPHATE SERPL-MCNC: 2.8 MG/DL (ref 2.3–4.7)
PLATELET # BLD AUTO: 336 X10(3)/MCL (ref 130–400)
PMV BLD AUTO: 9.2 FL (ref 7.4–10.4)
POTASSIUM SERPL-SCNC: 4.1 MMOL/L (ref 3.5–5.1)
PROT SERPL-MCNC: 5.8 GM/DL (ref 5.8–7.6)
RBC # BLD AUTO: 3.45 X10(6)/MCL (ref 4.2–5.4)
SODIUM SERPL-SCNC: 136 MMOL/L (ref 132–146)
SODIUM SERPL-SCNC: 138 MMOL/L (ref 132–146)
WBC # SPEC AUTO: 10 X10(3)/MCL (ref 4.5–11.5)

## 2024-01-28 PROCEDURE — 25000003 PHARM REV CODE 250: Performed by: INTERNAL MEDICINE

## 2024-01-28 PROCEDURE — 21400001 HC TELEMETRY ROOM

## 2024-01-28 PROCEDURE — 25000003 PHARM REV CODE 250: Performed by: FAMILY MEDICINE

## 2024-01-28 PROCEDURE — 84295 ASSAY OF SERUM SODIUM: CPT | Performed by: INTERNAL MEDICINE

## 2024-01-28 PROCEDURE — 85025 COMPLETE CBC W/AUTO DIFF WBC: CPT | Performed by: FAMILY MEDICINE

## 2024-01-28 PROCEDURE — 84100 ASSAY OF PHOSPHORUS: CPT | Performed by: FAMILY MEDICINE

## 2024-01-28 PROCEDURE — 27000207 HC ISOLATION

## 2024-01-28 PROCEDURE — 83735 ASSAY OF MAGNESIUM: CPT | Performed by: FAMILY MEDICINE

## 2024-01-28 PROCEDURE — 80053 COMPREHEN METABOLIC PANEL: CPT | Performed by: FAMILY MEDICINE

## 2024-01-28 PROCEDURE — A4216 STERILE WATER/SALINE, 10 ML: HCPCS | Performed by: INTERNAL MEDICINE

## 2024-01-28 PROCEDURE — 94761 N-INVAS EAR/PLS OXIMETRY MLT: CPT

## 2024-01-28 PROCEDURE — 63600175 PHARM REV CODE 636 W HCPCS: Performed by: INTERNAL MEDICINE

## 2024-01-28 RX ORDER — HYDROCODONE BITARTRATE AND ACETAMINOPHEN 5; 325 MG/1; MG/1
1 TABLET ORAL EVERY 6 HOURS PRN
Status: DISCONTINUED | OUTPATIENT
Start: 2024-01-28 | End: 2024-01-30 | Stop reason: HOSPADM

## 2024-01-28 RX ADMIN — DOCUSATE SODIUM LIQUID 50 MG: 100 LIQUID ORAL at 09:01

## 2024-01-28 RX ADMIN — GABAPENTIN 300 MG: 300 CAPSULE ORAL at 08:01

## 2024-01-28 RX ADMIN — GABAPENTIN 300 MG: 300 CAPSULE ORAL at 09:01

## 2024-01-28 RX ADMIN — ACETAMINOPHEN 650 MG: 325 TABLET, FILM COATED ORAL at 05:01

## 2024-01-28 RX ADMIN — CEFTRIAXONE SODIUM 1 G: 1 INJECTION, POWDER, FOR SOLUTION INTRAMUSCULAR; INTRAVENOUS at 05:01

## 2024-01-28 RX ADMIN — SODIUM CHLORIDE TAB 1 GM 1000 MG: 1 TAB at 08:01

## 2024-01-28 RX ADMIN — DOCUSATE SODIUM LIQUID 50 MG: 100 LIQUID ORAL at 08:01

## 2024-01-28 RX ADMIN — AMLODIPINE BESYLATE 5 MG: 5 TABLET ORAL at 09:01

## 2024-01-28 RX ADMIN — HYDROCODONE BITARTRATE AND ACETAMINOPHEN 1 TABLET: 5; 325 TABLET ORAL at 05:01

## 2024-01-28 RX ADMIN — POTASSIUM CHLORIDE 40 MEQ: 1500 TABLET, EXTENDED RELEASE ORAL at 09:01

## 2024-01-28 RX ADMIN — ACETAMINOPHEN 650 MG: 325 TABLET, FILM COATED ORAL at 09:01

## 2024-01-28 RX ADMIN — ATORVASTATIN CALCIUM 40 MG: 40 TABLET, FILM COATED ORAL at 09:01

## 2024-01-28 RX ADMIN — SODIUM CHLORIDE TAB 1 GM 1000 MG: 1 TAB at 09:01

## 2024-01-28 RX ADMIN — SODIUM CHLORIDE, PRESERVATIVE FREE 10 ML: 5 INJECTION INTRAVENOUS at 12:01

## 2024-01-28 RX ADMIN — POTASSIUM CHLORIDE 40 MEQ: 1500 TABLET, EXTENDED RELEASE ORAL at 08:01

## 2024-01-28 RX ADMIN — SODIUM CHLORIDE, PRESERVATIVE FREE 10 ML: 5 INJECTION INTRAVENOUS at 06:01

## 2024-01-28 RX ADMIN — POLYETHYLENE GLYCOL 3350 17 G: 17 POWDER, FOR SOLUTION ORAL at 09:01

## 2024-01-28 NOTE — PROGRESS NOTES
.Progress Note    Admit Date: 1/18/2024   LOS: 10 days     SUBJECTIVE:     Follow-up For:    She is alone in the room   She  crying out in pain   The nurse  anum is havig to  massage  her legs  10 min every hour  Tylenol  is   not helping   Her   daughter  is not  here  But the nurse  reports that  the family  is not  satisfied w  her pain controlled    Scheduled Meds:   acetaminophen  650 mg Oral Q8H WA    amLODIPine  5 mg Oral Daily    atorvastatin  40 mg Oral Daily    bisacodyL  10 mg Rectal Daily    cefTRIAXone (Rocephin) IV (PEDS and ADULTS)  1 g Intravenous Q12H    docusate  50 mg Oral BID    gabapentin  300 mg Oral BID    polyethylene glycol  17 g Oral Daily    potassium chloride  40 mEq Oral BID    sodium chloride 0.9%  10 mL Intravenous Q6H    sodium chloride  1,000 mg Oral BID     Continuous Infusions:   sodium chloride 3% HYPERTONIC 25 mL/hr (01/28/24 0536)     PRN Meds:albuterol-ipratropium, bisacodyL, hydrALAZINE, HYDROcodone-acetaminophen, sodium chloride 0.9%, Flushing PICC/Midline Protocol **AND** sodium chloride 0.9% **AND** sodium chloride 0.9%    Review of patient's allergies indicates:  No Known Allergies    Review of Systems  ROS    OBJECTIVE:     Vital Signs (Most Recent)  Temp: 97.8 °F (36.6 °C) (01/28/24 0713)  Pulse: 93 (01/28/24 0713)  Resp: 18 (01/27/24 1503)  BP: (!) 150/82 (01/28/24 0713)  SpO2: (!) 94 % (01/28/24 0713)    Vital Signs Range (Last 24H):  Temp:  [97.5 °F (36.4 °C)-97.9 °F (36.6 °C)]   Pulse:  [90-94]   Resp:  [18]   BP: (136-150)/(82-88)   SpO2:  [93 %-95 %]     I & O (Last 24H):  Intake/Output Summary (Last 24 hours) at 1/28/2024 1334  Last data filed at 1/28/2024 0800  Gross per 24 hour   Intake 1701.27 ml   Output 300 ml   Net 1401.27 ml       Physical Exam:  Physical Exam   Vitals:    01/28/24 0713   BP: (!) 150/82   Pulse: 93   Resp:    Temp: 97.8 °F (36.6 °C)       Physical Exam   Constitutional: alert & oriented, very   distressed   about her leg pain   She is   tired       HENT: lying  in bed   Head: Normocephalic and staples   Eyes: Conjunctivae normal and EOM are normal. Pupils are equal, round, and reactive to light.  She is  able to  follow commands  Neck: Normal range of motion. Neck supple.   Cardiovascular: Normal rate, regular rhythm, normal heart sounds   Pulmonary/Chest: CTAB, nonlabored respiration  Abdominal: Soft, nontender, bowel sounds normal  Neurological: nonfocal  Skin: warm, dry intact  Psychiatric: normal mood and affect, cooperative  No edema          Laboratory:  Recent Results (from the past 24 hour(s))   Sodium    Collection Time: 01/27/24  8:09 PM   Result Value Ref Range    Sodium Level 138 132 - 146 mmol/L   Comprehensive Metabolic Panel    Collection Time: 01/28/24  8:16 AM   Result Value Ref Range    Sodium Level 138 132 - 146 mmol/L    Potassium Level 4.1 3.5 - 5.1 mmol/L    Chloride 107 98 - 111 mmol/L    Carbon Dioxide 23 23 - 31 mmol/L    Glucose Level 132 (H) 75 - 121 mg/dL    Blood Urea Nitrogen 8.0 (L) 9.8 - 20.1 mg/dL    Creatinine 0.45 (L) 0.55 - 1.02 mg/dL    Calcium Level Total 8.5 8.4 - 10.2 mg/dL    Protein Total 5.8 5.8 - 7.6 gm/dL    Albumin Level 2.5 (L) 3.4 - 4.8 g/dL    Globulin 3.3 2.4 - 3.5 gm/dL    Albumin/Globulin Ratio 0.8 (L) 1.1 - 2.0 ratio    Bilirubin Total 0.5 <=1.5 mg/dL    Alkaline Phosphatase 95 40 - 150 unit/L    Alanine Aminotransferase 47 0 - 55 unit/L    Aspartate Aminotransferase 47 (H) 5 - 34 unit/L    eGFR >60 mls/min/1.73/m2   Magnesium    Collection Time: 01/28/24  8:16 AM   Result Value Ref Range    Magnesium Level 1.50 (L) 1.60 - 2.60 mg/dL   Phosphorus    Collection Time: 01/28/24  8:16 AM   Result Value Ref Range    Phosphorus Level 2.8 2.3 - 4.7 mg/dL   CBC with Differential    Collection Time: 01/28/24  8:16 AM   Result Value Ref Range    WBC 10.00 4.50 - 11.50 x10(3)/mcL    RBC 3.45 (L) 4.20 - 5.40 x10(6)/mcL    Hgb 10.8 (L) 12.0 - 16.0 g/dL    Hct 32.4 (L) 37.0 - 47.0 %    MCV 93.9 80.0 - 94.0 fL     MCH 31.3 (H) 27.0 - 31.0 pg    MCHC 33.3 33.0 - 36.0 g/dL    RDW 13.4 11.5 - 17.0 %    Platelet 336 130 - 400 x10(3)/mcL    MPV 9.2 7.4 - 10.4 fL    Neut % 80.1 %    Lymph % 9.7 %    Mono % 6.4 %    Eos % 1.4 %    Basophil % 0.7 %    Lymph # 0.97 0.6 - 4.6 x10(3)/mcL    Neut # 8.01 2.1 - 9.2 x10(3)/mcL    Mono # 0.64 0.1 - 1.3 x10(3)/mcL    Eos # 0.14 0 - 0.9 x10(3)/mcL    Baso # 0.07 <=0.2 x10(3)/mcL    IG# 0.17 (H) 0 - 0.04 x10(3)/mcL    IG% 1.7 %        Diagnostic Results:  X-Ray Chest 1 View for Line/Tube Placement    Result Date: 1/20/2024  EXAMINATION: XR CHEST 1 VIEW FOR LINE/TUBE PLACEMENT CLINICAL HISTORY: Status post central venous catheter placement. TECHNIQUE: Single frontal portable view of the chest was performed. COMPARISON: Earlier same day. FINDINGS: There is interval placement a right extremity PICC central venous catheter with catheter tip overlying the distal SVC versus SVC/RA junction.  There is no pneumothorax.  There is slight interval increase in alveolar opacities in left lung base, trace left effusion cannot be excluded.  No suspicious interstitial or alveolar opacities in the right lung, there is no right effusion.  There is similar cardiomegaly.  The central pulmonary vessels and mediastinum are normal in size.     1. Interval placement right upper extremity PICC central venous catheter, as above, with no pneumothorax identified. 2. Interval increase in alveolar opacities in the left lung base.  A trace effusion cannot be excluded. 3. Similar cardiomegaly. Electronically signed by: Genaro Herzog MD Date:    01/20/2024 Time:    09:19    CT Head Without Contrast    Result Date: 1/19/2024  EXAMINATION: CT HEAD WITHOUT CONTRAST CLINICAL HISTORY: Subdural hemorrhage;, . TECHNIQUE: PATIENT RADIATION DOSE: DLP(mGycm) 988 As per PQRS measures, all CT scans at this facility used dose modulation, iterative reconstruction, and/or weight based dose adjustment when appropriate to reduce radiation  dose to as low as reasonably achievable. COMPARISON: 01/18/2024 FINDINGS: Serial axial images were obtained of the head without the administration of IV contrast. Both brain and bone parenchymal windows were obtained.  Additional coronal and sagittal reconstructions were obtained.  The right subdural hematoma is less lobulated in appearance since the prior exam without the epidural characteristics.  There is interim increase in density and size of the sub dural hematoma along the right cerebral convexity, compatible with acute component.  There is interim increase in size of the small parenchymal hemorrhage at the right frontal lobe since the prior exam now measuring up to 2.5 cm in greatest diameter.  Small parenchymal hemorrhages at the posterior right parietal lobe are similar in appearance.  Parenchymal defect is again evident at the right the temple parietal lobe compatible with old infarct/encephalomalacia.  The hematoma measures approximately 1.5 cm in greatest thickness.  There is now diffuse sulcal effacement along the right cerebral hemisphere with slight midline shift to the left estimated at 4-5 mm.  Intracranial atherosclerosis seen.  Scattered hypodensities are noted to the periventricular white matter.  Paranasal sinuses and mastoid air cells are relatively clear.  External auditory canals are grossly patent.  There is interim resolution of previous soft tissue emphysema at the posterior left scalp with overlying staples.     1. Interim changes in configuration of previous right subdural hematoma with decreased lobulation and increased opacification of the right cerebral convexity measuring up to 1.5 cm in thickness.  There is overall increase in density compatible with the increase in acute hemorrhagic components.  There is interim development of diffuse mass effect/diffuse sulcal effacement along the right cerebral convexity and slight midline shift to the left estimated at 4-5 mm. 2. Interim  increase in size of small parenchymal hemorrhage at the anterior right frontal lobe since the prior exam measuring up to 2.5 cm 3. Small parenchymal hemorrhages at the posterior right parietal lobe are similar in size 4. Generalized cerebral and cerebellar atrophy 5. Intracranial atherosclerosis 6. Interim resolution of scalp emphysema/scalp hematoma overlying the left occipital bone with overlying staples 7. The referring physician was notified of the findings on 01/19/2024 at 16:15. Electronically signed by: Phill Giles Date:    01/19/2024 Time:    16:24    X-Ray Chest 1 View    Result Date: 1/19/2024  EXAMINATION: XR CHEST 1 VIEW CLINICAL HISTORY: Covid +;, . COMPARISON: 01/02/2024 FINDINGS: An AP view or more reveals the heart to be borderline enlarged.  The trachea is midline.  Atherosclerosis is seen within the aorta.  There is interim improved aeration of the lower lungs bilaterally.  Degenerative changes and mild curvature are noted to the thoracic spine.  Bony structures are osteopenic.     1. Improved aeration of the lower lungs bilaterally with minimal residual infiltrate and or atelectasis left lung base 2. Borderline cardiomegaly 3. Atherosclerosis 4. Thoracic spondylosis, scoliosis, and osteopenia Electronically signed by: Phill Giels Date:    01/19/2024 Time:    11:23    CT Chest Without Contrast    Result Date: 1/18/2024  EXAMINATION: CT CHEST WITHOUT CONTRAST CLINICAL HISTORY: FALL;, . TECHNIQUE: PATIENT RADIATION DOSE: DLP(mGycm) 263 As per PQRS measures, all CT scans at this facility used dose modulation, iterative reconstruction, and/or weight based dose adjustment when appropriate to reduce radiation dose to as low as reasonably achievable. COMPARISON: None available FINDINGS: Degenerative changes are noted to the thoracic spine.  There is anterior wedge compression deformity at T7 without the 13 50 loss of anterior vertebral body height.  There is low-attenuation heterogeneity of the  thyroid lobes bilaterally.  There is considerable gaseous distension of the upper and mid esophagus suggesting presbyesophagus measuring up the 4 cm in diameter.  Atherosclerosis is seen within the aorta and branching vessels.  There is enlargement of the ascending aorta measuring 3.6 cm in AP diameter.  The heart is normal in size.  Trace pericardial effusion is present.  A small left pleural effusion is identified with associated infiltrate and atelectasis at the left lower lung.  The airways are grossly patent.  Patchy wedge-shaped nodular foci are evident at the apices bilaterally with the right larger than the left.  There is associated bronchiectatic changes at the right apex.  Atelectasis and/or scarring is evident posterior right upper lung.  A smaller pleural reaction is noted to the posterior right lung base with minimal infiltrate and or atelectasis.     1. Considerable gas distension of the upper and mid esophagus measuring up to 4 cm in diameter suspicious for presbyesophagus 2. Small left pleural effusion with associated infiltrate and atelectasis at the left lung base 3. Smaller right basilar pleural reaction with associated infiltrate and atelectasis 4. Nodular wedge-shaped atelectasis and or scarring at the lung apices with bronchiectasis at the right apex. 5. Atherosclerosis with prominent ascending aorta 6. Thoracic spondylosis with anterior wedge compression deformity at T7 Electronically signed by: Phill Giles Date:    01/18/2024 Time:    14:15    CT Cervical Spine Without Contrast    Result Date: 1/18/2024  EXAMINATION: CT CERVICAL SPINE WITHOUT CONTRAST CLINICAL HISTORY: , Fall; TECHNIQUE,: PATIENT RADIATION DOSE: DLP(mGycm) 1115 As per PQRS measures, all CT scans at this facility used dose modulation, iterative reconstruction, and/or weight based dose adjustment when appropriate to reduce radiation dose to as low as reasonably achievable. COMPARISON: None available FINDINGS: Serial axial  images were obtained of the cervical spine with the administration of intravenous contrast.  Additional coronal and sagittal images were performed.  Both soft tissue and bone windows were obtained. Vertebral body height is grossly intact.  There is slight retrolisthesis of C5 on C6.  No fracture is seen.  There is no loss of integrity to the bony spinal canal.  Scattered small subchondral cyst formation is noted to the vertebral bodies including the odontoid process.  Disc space narrowing, osteophyte formation, and facet arthrosis are identified.  Degenerative endplate changes are evident at C5-6.  Bony structures are osteopenic.  There is multilevel mild to severe encroachment into the neural foramina and to a lesser extent the central spinal canal secondary to posterior osteophyte formation, facet arthrosis, and poorly visualized posterior disc bulge.  There is curvature of the cervical spine with the convexity to the left.  There is low-attenuation heterogeneity of the thyroid lobes.  There is a nodular wedge-shaped atelectasis and or scarring the lung apices.  There is unusual considerable gaseous distension of the upper esophagus measuring 4 cm in diameter.  Atherosclerosis is seen within the carotid bulbs.     1. Slight retrolisthesis of C5 on C6 2. Moderate to advanced cervical spondylosis 3. Osteopenia 4. Levoconvexity of the cervical spine 5. Multilevel mild to severe encroachment into the neural foramina and to a lesser extent into the central spinal canal as described 6. Unusual considerable gaseous distension of the upper esophagus incompletely included on the exam.  CT exam of the chest would allow further evaluation. Electronically signed by: Phill Giles Date:    01/18/2024 Time:    13:42    CT Head Without Contrast    Result Date: 1/18/2024  EXAMINATION: CT HEAD WITHOUT CONTRAST CLINICAL HISTORY: Fall;, . TECHNIQUE: PATIENT RADIATION DOSE: DLP(mGycm) 111 5 As per PQRS measures, all CT scans at this  facility used dose modulation, iterative reconstruction, and/or weight based dose adjustment when appropriate to reduce radiation dose to as low as reasonably achievable. COMPARISON: 12/28/2023 FINDINGS: Serial axial images were obtained of the head without the administration of IV contrast. Both brain and bone parenchymal windows were obtained.  Additional coronal and sagittal reconstructions were obtained.  Ventricles, cisterns, and sulci are prominent in size.  There is interim development of a bilobed sub dural and suspect epidural hematoma at the right lateral and posterior convexity.  The are acute and subacute components within the hematomas.  The hematoma measures up to 1.5 cm in thickness.  Scattered parenchymal hemorrhages are evident at the right posterior parietal lobe and right occipital lobe and at the anterior right frontal lobe.  There is again low-attenuation/parenchymal defect/encephalomalacia at the distribution of the right middle cerebral artery.  No significant midline shift is appreciated.  There is again sulcal effacement at the right parietal simple lobe.  Scattered hypodensities are again seen within the periventricular white matter.  Intracranial atherosclerosis is seen.  Visualized portions the paranasal sinuses and mastoid air cells are relatively clear.  External auditory canals are grossly patent.  No acute fracture is seen.  There is a scalp hematoma with soft tissue emphysema overlying the left occipital bone.     1. Interim development of a lobulated sub dural and suspect epidural hematomas along the right parietal and posterior convexity containing acute and nonacute the components measuring up to 1.5 cm in thickness 2. Interim development of scattered small parenchymal hemorrhages at the posterior right parietal lobe and right occipital lobe as well as a small bleed at the anterior right frontal lobe 3. Parenchymal defect/old infarct/encephalomalacia in the distribution of the  right middle cerebral artery 4. Generalized cerebral and cerebellar atrophy 5. Intracranial atherosclerosis 6. Scattered hypodensities at the periventricular white matter suspicious for small vessel ischemic changes 7. Interim development of a scalp normal and soft tissue emphysema overlying the left occipital 8. The emergency department physician was notified of the findings at 13:10. 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 She  seems to have  come  back to  her  reported baseline.   She is   very  concerned  about her  sleep -  she  slept  last night  without  any  medication   She  has  scheduled  tylenol for  pain   She  had  some status  changes  w  tramadol.   She is  requesting   melatonin  this  can  contrib  to   low  na   She is on  hot  salt  for low   sodium  dr aleman  would like her  na to  be >140   for  24-28 hour prior to  stopping it .   Her cortisol  was normal   Her  k  was still  low  will  increase  k     to 40  bid  and will recheck in am     Possible move to  SNF on Monday 1/28  Will give  norco today   The nurse  says tylenol  is not  helping and she has been  crying all morning   Her  na  is  138  still under  the goal  of 140  will cont   hot salt

## 2024-01-29 PROBLEM — L29.9 PRURITUS: Status: ACTIVE | Noted: 2024-01-29

## 2024-01-29 LAB — SODIUM SERPL-SCNC: 135 MMOL/L (ref 132–146)

## 2024-01-29 PROCEDURE — 11000001 HC ACUTE MED/SURG PRIVATE ROOM

## 2024-01-29 PROCEDURE — 25000003 PHARM REV CODE 250: Performed by: FAMILY MEDICINE

## 2024-01-29 PROCEDURE — 84295 ASSAY OF SERUM SODIUM: CPT | Performed by: INTERNAL MEDICINE

## 2024-01-29 PROCEDURE — 99900035 HC TECH TIME PER 15 MIN (STAT)

## 2024-01-29 PROCEDURE — 27000221 HC OXYGEN, UP TO 24 HOURS

## 2024-01-29 PROCEDURE — 97164 PT RE-EVAL EST PLAN CARE: CPT

## 2024-01-29 PROCEDURE — 94761 N-INVAS EAR/PLS OXIMETRY MLT: CPT

## 2024-01-29 PROCEDURE — 97530 THERAPEUTIC ACTIVITIES: CPT

## 2024-01-29 PROCEDURE — 25000003 PHARM REV CODE 250: Performed by: INTERNAL MEDICINE

## 2024-01-29 PROCEDURE — 63600175 PHARM REV CODE 636 W HCPCS: Performed by: INTERNAL MEDICINE

## 2024-01-29 PROCEDURE — A4216 STERILE WATER/SALINE, 10 ML: HCPCS | Performed by: INTERNAL MEDICINE

## 2024-01-29 RX ORDER — CETIRIZINE HYDROCHLORIDE 10 MG/1
10 TABLET ORAL DAILY
Status: DISCONTINUED | OUTPATIENT
Start: 2024-01-29 | End: 2024-01-30 | Stop reason: HOSPADM

## 2024-01-29 RX ORDER — HYDROCORTISONE 5 MG/1
5 TABLET ORAL DAILY
Status: DISCONTINUED | OUTPATIENT
Start: 2024-01-29 | End: 2024-01-30 | Stop reason: HOSPADM

## 2024-01-29 RX ORDER — LANOLIN ALCOHOL/MO/W.PET/CERES
400 CREAM (GRAM) TOPICAL DAILY
Status: DISCONTINUED | OUTPATIENT
Start: 2024-01-29 | End: 2024-01-30 | Stop reason: HOSPADM

## 2024-01-29 RX ORDER — HYDROCORTISONE 5 MG/1
10 TABLET ORAL DAILY
Status: DISCONTINUED | OUTPATIENT
Start: 2024-01-29 | End: 2024-01-30 | Stop reason: HOSPADM

## 2024-01-29 RX ADMIN — POTASSIUM CHLORIDE 40 MEQ: 1500 TABLET, EXTENDED RELEASE ORAL at 08:01

## 2024-01-29 RX ADMIN — CEFTRIAXONE SODIUM 1 G: 1 INJECTION, POWDER, FOR SOLUTION INTRAMUSCULAR; INTRAVENOUS at 05:01

## 2024-01-29 RX ADMIN — GABAPENTIN 300 MG: 300 CAPSULE ORAL at 08:01

## 2024-01-29 RX ADMIN — ACETAMINOPHEN 650 MG: 325 TABLET, FILM COATED ORAL at 08:01

## 2024-01-29 RX ADMIN — AMLODIPINE BESYLATE 5 MG: 5 TABLET ORAL at 08:01

## 2024-01-29 RX ADMIN — POLYETHYLENE GLYCOL 3350 17 G: 17 POWDER, FOR SOLUTION ORAL at 08:01

## 2024-01-29 RX ADMIN — CETIRIZINE HYDROCHLORIDE 10 MG: 10 TABLET, FILM COATED ORAL at 11:01

## 2024-01-29 RX ADMIN — HYDROCORTISONE 5 MG: 5 TABLET ORAL at 02:01

## 2024-01-29 RX ADMIN — DOCUSATE SODIUM LIQUID 50 MG: 100 LIQUID ORAL at 08:01

## 2024-01-29 RX ADMIN — SODIUM CHLORIDE TAB 1 GM 1000 MG: 1 TAB at 08:01

## 2024-01-29 RX ADMIN — SODIUM CHLORIDE, PRESERVATIVE FREE 10 ML: 5 INJECTION INTRAVENOUS at 12:01

## 2024-01-29 RX ADMIN — SODIUM CHLORIDE, PRESERVATIVE FREE 10 ML: 5 INJECTION INTRAVENOUS at 06:01

## 2024-01-29 RX ADMIN — MAGNESIUM OXIDE TAB 400 MG (241.3 MG ELEMENTAL MG) 400 MG: 400 (241.3 MG) TAB at 08:01

## 2024-01-29 RX ADMIN — ATORVASTATIN CALCIUM 40 MG: 40 TABLET, FILM COATED ORAL at 08:01

## 2024-01-29 RX ADMIN — HYDROCODONE BITARTRATE AND ACETAMINOPHEN 1 TABLET: 5; 325 TABLET ORAL at 04:01

## 2024-01-29 RX ADMIN — HYDROCORTISONE 10 MG: 5 TABLET ORAL at 08:01

## 2024-01-29 RX ADMIN — ACETAMINOPHEN 650 MG: 325 TABLET, FILM COATED ORAL at 03:01

## 2024-01-29 NOTE — PT/OT/SLP PROGRESS
Physical Therapy Treatment    Patient Name:  Bhavani Galarza   MRN:  [id number]    Recommendations:     Discharge Recommendations: Moderate Intensity Therapy  Discharge Equipment Recommendations:  (to be determined once activity limitations are lifted)  Barriers to discharge: None    Assessment:     Bhavani Galarza is a 89 y.o. female admitted with a medical diagnosis of SDH (subdural hematoma).  She presents with the following impairments/functional limitations: impaired endurance, weakness, impaired functional mobility, impaired balance .    Rehab Prognosis: Fair; patient would benefit from acute skilled PT services to address these deficits and reach maximum level of function.    Recent Surgery: * No surgery found *      Plan:     During this hospitalization, patient to be seen 5 x/week (5-6x weekly/1-2x daily) to address the identified rehab impairments via gait training, therapeutic activities, therapeutic exercises and progress toward the following goals:    Plan of Care Expires:  02/29/24    Subjective     Chief Complaint: tired, wants to sleep  Patient/Family Comments/goals: to get in bed and nap  Pain/Comfort:         Objective:     Communicated with pt, caregiver prior to session.  Patient found up in chair with   upon PT entry to room.     General Precautions: Standard, fall  Orthopedic Precautions:    Braces: N/A  Respiratory Status: Nasal cannula, flow 2 L/min     Functional Mobility:  Transfers:     Bed to Chair: total assistance and of 2 persons with  no AD  using  Squat Pivot      AM-PAC 6 CLICK MOBILITY          Treatment & Education:  Pt with significant fatigue, unable to help with tf.    Patient left HOB elevated with all lines intact, call button in reach, and bed alarm on..    GOALS:   Multidisciplinary Problems       Physical Therapy Goals          Problem: Physical Therapy    Goal Priority Disciplines Outcome Goal Variances Interventions   Physical Therapy Goal     PT, PT/OT Ongoing,  Progressing     Description: 1.  Pt to be instructed in safe, appropriate exercises in bed to maintain BLE AROM and strength while on current activity limitations today 1-19-24.  2.  Pt to be reassessed and MD/Nurse to be consulted prior to further PT on Monday 1-22-24 in regards to activity limitations and medical status.                       Time Tracking:     PT Received On: 01/29/24  PT Start Time: 1200     PT Stop Time: 1215  PT Total Time (min): 15 min     Billable Minutes: Therapeutic Activity 15    Treatment Type: Treatment  PT/PTA: PT           01/29/2024

## 2024-01-29 NOTE — PT/OT/SLP RE-EVAL
Physical Therapy Re-evaluation    Patient Name:  Bhavani Galarza   MRN:  [id number]    Recommendations:     Discharge Recommendations: Moderate Intensity Therapy  Discharge Equipment Recommendations:  (to be determined once activity limitations are lifted)   Barriers to discharge:  current medical status    Assessment:     Bhavain Galarza is a 89 y.o. female admitted with a medical diagnosis of SDH (subdural hematoma).  She presents with the following impairments/functional limitations: impaired endurance, weakness, impaired functional mobility, impaired balance     Received new order. Patient reevaluated today. Patient performed supine to sit with min A then sit to stand with min A and transferred to recliner with min A via step transfer. Patient left up in chair with family present.     Rehab Prognosis:  fair; patient would benefit from acute skilled PT services to address these deficits and reach maximum level of function.      Recent Surgery: * No surgery found *      Plan:     During this hospitalization, patient to be seen 5 x/week (5-6x weekly/1-2x daily) to address the above listed problems via gait training, therapeutic activities, therapeutic exercises  Plan of Care Expires:  02/29/24  Plan of Care Reviewed with: patient, daughter    Subjective     Communicated with nursing prior to session.  Patient found HOB elevated with peripheral IV, PureWick, oxygen upon PT entry to room, agreeable to evaluation.      Chief Complaint: weakness  Patient comments/goals: to get stronger  Pain/Comfort:       Patients cultural, spiritual, Orthodoxy conflicts given the current situation:        Objective:     Patient found with: peripheral IV, PureWick, oxygen     General Precautions: Standard, fall  Orthopedic Precautions:    Braces: N/A  Respiratory Status: Nasal cannula, flow 6 L/min    Exams:  RUE Strength: grossly 4-/5  LUE Strength: grossly 4-/5  RLE Strength: grossly 3+/5  LLE Strength: grossly 3+/5    Functional  Mobility:  Bed Mobility:     Supine to Sit: minimum assistance  Transfers:     Sit to Stand:  minimum assistance with hand-held assist  Bed to Chair: minimum assistance with  hand-held assist  using  Step Transfer    AM-PAC 6 CLICK MOBILITY  Total Score:        Treatment and Education:   See above.     Patient left up in chair with all lines intact, call button in reach, and chair alarm on.    GOALS:   Multidisciplinary Problems       Physical Therapy Goals          Problem: Physical Therapy    Goal Priority Disciplines Outcome Goal Variances Interventions   Physical Therapy Goal     PT, PT/OT Ongoing, Progressing     Description: 1.  Pt to be instructed in safe, appropriate exercises in bed to maintain BLE AROM and strength while on current activity limitations today 1-19-24.  2.  Pt to be reassessed and MD/Nurse to be consulted prior to further PT on Monday 1-22-24 in regards to activity limitations and medical status.                       History:     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        Past Surgical History:   Procedure Laterality Date    BACK SURGERY      COLONOSCOPY      X 2    EPIDURAL STEROID INJECTION INTO LUMBAR SPINE Right 10/20/2023    Procedure: INJECTION, STEROID, SPINE, LUMBAR, EPIDURAL (Right L5- S1 ILESI);  Surgeon: Amrit Song MD;  Location: [facility] OR;  Service: Pain Management;  Laterality: Right;    TONSILLECTOMY      TRANSFORAMINAL EPIDURAL INJECTION OF STEROID Right 09/22/2023    Procedure: INJECTION, STEROID, EPIDURAL, TRANSFORAMINAL APPROACH (Right TFESI L4 & L5);  Surgeon: Amrit Song MD;  Location: [facility] OR;  Service: Pain Management;  Laterality: Right;       Time Tracking:     PT Received On: 01/29/24  PT Start Time: 1035     PT Stop Time: 1050  PT Total Time (min): 15 min     Billable Minutes: Re-eval 15      01/29/2024

## 2024-01-29 NOTE — PLAN OF CARE
Spoke to Dr. Samaniego this morning and he said he will d/c pt back to [facility] tomorrow on SNF.  Updates sent to [facility] and notified F[facility].

## 2024-01-30 VITALS
DIASTOLIC BLOOD PRESSURE: 67 MMHG | WEIGHT: 104.75 LBS | TEMPERATURE: 98 F | BODY MASS INDEX: 19.78 KG/M2 | HEART RATE: 97 BPM | OXYGEN SATURATION: 91 % | RESPIRATION RATE: 17 BRPM | HEIGHT: 61 IN | SYSTOLIC BLOOD PRESSURE: 112 MMHG

## 2024-01-30 PROBLEM — E27.1 ADRENAL INSUFFICIENCY, PRIMARY: Status: ACTIVE | Noted: 2024-01-30

## 2024-01-30 PROBLEM — U07.1 COVID-19: Status: RESOLVED | Noted: 2024-01-18 | Resolved: 2024-01-30

## 2024-01-30 PROBLEM — S90.32XA: Status: RESOLVED | Noted: 2023-12-26 | Resolved: 2024-01-30

## 2024-01-30 PROBLEM — R63.0 DECREASED APPETITE: Status: RESOLVED | Noted: 2024-01-03 | Resolved: 2024-01-30

## 2024-01-30 PROBLEM — R79.89 ELEVATED TROPONIN I LEVEL: Status: RESOLVED | Noted: 2024-01-25 | Resolved: 2024-01-30

## 2024-01-30 PROBLEM — R06.02 SOB (SHORTNESS OF BREATH): Status: ACTIVE | Noted: 2024-01-30

## 2024-01-30 LAB
ALBUMIN SERPL-MCNC: 2.6 G/DL (ref 3.4–4.8)
ALBUMIN/GLOB SERPL: 0.7 RATIO (ref 1.1–2)
ALP SERPL-CCNC: 124 UNIT/L (ref 40–150)
ALT SERPL-CCNC: 49 UNIT/L (ref 0–55)
AST SERPL-CCNC: 43 UNIT/L (ref 5–34)
BILIRUB SERPL-MCNC: 0.6 MG/DL
BUN SERPL-MCNC: 7 MG/DL (ref 9.8–20.1)
CALCIUM SERPL-MCNC: 9.1 MG/DL (ref 8.4–10.2)
CHLORIDE SERPL-SCNC: 101 MMOL/L (ref 98–111)
CO2 SERPL-SCNC: 24 MMOL/L (ref 23–31)
CREAT SERPL-MCNC: 0.51 MG/DL (ref 0.55–1.02)
GFR SERPLBLD CREATININE-BSD FMLA CKD-EPI: >60 MLS/MIN/1.73/M2
GLOBULIN SER-MCNC: 3.5 GM/DL (ref 2.4–3.5)
GLUCOSE SERPL-MCNC: 110 MG/DL (ref 75–121)
POTASSIUM SERPL-SCNC: 4.6 MMOL/L (ref 3.5–5.1)
PROT SERPL-MCNC: 6.1 GM/DL (ref 5.8–7.6)
SODIUM SERPL-SCNC: 133 MMOL/L (ref 132–146)

## 2024-01-30 PROCEDURE — 80053 COMPREHEN METABOLIC PANEL: CPT | Performed by: INTERNAL MEDICINE

## 2024-01-30 PROCEDURE — 25000003 PHARM REV CODE 250: Performed by: INTERNAL MEDICINE

## 2024-01-30 PROCEDURE — 94761 N-INVAS EAR/PLS OXIMETRY MLT: CPT

## 2024-01-30 PROCEDURE — A4216 STERILE WATER/SALINE, 10 ML: HCPCS | Performed by: INTERNAL MEDICINE

## 2024-01-30 PROCEDURE — 25000003 PHARM REV CODE 250: Performed by: FAMILY MEDICINE

## 2024-01-30 RX ORDER — AMLODIPINE BESYLATE 5 MG/1
5 TABLET ORAL DAILY
Qty: 30 TABLET | Refills: 11 | Status: SHIPPED | OUTPATIENT
Start: 2024-01-30 | End: 2025-01-29

## 2024-01-30 RX ORDER — LANOLIN ALCOHOL/MO/W.PET/CERES
400 CREAM (GRAM) TOPICAL DAILY
Refills: 0
Start: 2024-01-30

## 2024-01-30 RX ORDER — ACETAMINOPHEN 325 MG/1
650 TABLET ORAL EVERY 12 HOURS
Refills: 0
Start: 2024-01-30

## 2024-01-30 RX ORDER — SODIUM CHLORIDE 1 G/1
1000 TABLET ORAL 2 TIMES DAILY
Qty: 1 TABLET
Start: 2024-01-30 | End: 2025-06-13

## 2024-01-30 RX ORDER — BUDESONIDE 0.25 MG/2ML
0.25 INHALANT ORAL EVERY 12 HOURS
Refills: 0
Start: 2024-01-30 | End: 2024-02-06

## 2024-01-30 RX ORDER — HYDROCORTISONE 5 MG/1
5 TABLET ORAL DAILY
Qty: 1 TABLET | Refills: 0
Start: 2024-01-30 | End: 2024-01-31

## 2024-01-30 RX ORDER — BISACODYL 10 MG/1
10 SUPPOSITORY RECTAL DAILY PRN
Refills: 0
Start: 2024-01-30

## 2024-01-30 RX ORDER — HYDROCORTISONE 10 MG/1
10 TABLET ORAL DAILY
Qty: 10 TABLET | Refills: 0
Start: 2024-01-30 | End: 2025-02-01

## 2024-01-30 RX ADMIN — AMLODIPINE BESYLATE 5 MG: 5 TABLET ORAL at 09:01

## 2024-01-30 RX ADMIN — GABAPENTIN 300 MG: 300 CAPSULE ORAL at 09:01

## 2024-01-30 RX ADMIN — SODIUM CHLORIDE TAB 1 GM 1000 MG: 1 TAB at 09:01

## 2024-01-30 RX ADMIN — CETIRIZINE HYDROCHLORIDE 10 MG: 10 TABLET, FILM COATED ORAL at 09:01

## 2024-01-30 RX ADMIN — HYDROCORTISONE 10 MG: 5 TABLET ORAL at 09:01

## 2024-01-30 RX ADMIN — ATORVASTATIN CALCIUM 40 MG: 40 TABLET, FILM COATED ORAL at 09:01

## 2024-01-30 RX ADMIN — SODIUM CHLORIDE, PRESERVATIVE FREE 10 ML: 5 INJECTION INTRAVENOUS at 12:01

## 2024-01-30 RX ADMIN — SODIUM CHLORIDE, PRESERVATIVE FREE 10 ML: 5 INJECTION INTRAVENOUS at 05:01

## 2024-01-30 RX ADMIN — MAGNESIUM OXIDE TAB 400 MG (241.3 MG ELEMENTAL MG) 400 MG: 400 (241.3 MG) TAB at 09:01

## 2024-01-30 RX ADMIN — ACETAMINOPHEN 650 MG: 325 TABLET, FILM COATED ORAL at 09:01

## 2024-01-30 NOTE — DISCHARGE SUMMARY
[facility] - Medical Surgical Unit  Hospital Medicine  Discharge Summary      Patient Name: Bhavani Galarza  MRN: [id number]  COURTNEY: [id number]  Patient Class: IP- Inpatient  Admission Date: 1/18/2024  Hospital Length of Stay: 12 days  Discharge Date and Time:  01/30/2024 8:42 AM  Attending Physician: Tomer Samaniego III, *   Discharging Provider: Tomer Samaniego III, MD  Primary Care Provider: Tomer Samaniego III, MD    Primary Care Team: Networked reference to record PCT     HPI:   89-year-old white female with a recent MCA ischemic infarct from 3 weeks ago has a fall reported and evaluated in the ER with 3 subdural right-sided small bleeds.  Patient also presents with hyponatremia semi of 131.  Discussion with the family in my previous BERNARD for seen her early this Monday, she reported with improving strength, appetite.  She had a left lower quadrant anopsia from previous right-sided MCA infarct.  Doing better but we recommended her to continue PT at the nursing home.    I was contacted by Dr. Nixon from the ER which point he discussed me and his preliminary workup of the patient's bleeds.  No mentioned of the patient being COVID was made to me, nonetheless I see in the chart this evening.    Patient denies any particular pain.  She is able to get up and move to the commode with maximal assist.  She does have some communication issues and she does look to have a little bit of disorientation    Collateral information with the family agrees with regards to the patient.  The daughter in [place] was contacted about the patient being COVID positive.  The daughter-in-law Liv noted today that the patient had fallen in a manner while she was waiting to be taken to the bathroom.    Upon my discussion with the ER doctor, he had a further discussion with the patient and the family power-of- and daughter Ms. Sorto.  They did not want any aggressive measures.  They wanted to keep her in  [place] without any neurosurgical consultation for management of the intracranial bleeds.  Please see Dr. Nixon is documentation    * No surgery found *      Hospital Course:   Patient 89-year-old white female with the extensive neurovascular course of a right MCA CVA proximally month ago, placed on appropriate anticoagulation and statin therapy, then suffered a fall with 3 right-sided temporal parietal and frontal bleed that coalesced as a subdural hematoma.  Patient was admitted to the hospital here on a proximally January 18, 2024 was diagnosed incidentally with COVID from the nursing home.    From a neurologic perspective, metabolic encephalopathy secondary to neurovascular injury as well as hyponatremia.  I am recommending no anticoagulation at this point but continue statin.      Will also follow up on her sodium levels.  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  She is on salt tablets and a started on some hydrocortisone 10/5 in the a.m./p.m..  Will follow those and continue those meds.      From a pulmonary standpoint I am going to add on some budesonide as well.  This is for post COVID and her complain of shortness a breath.  Also consider adding some nitroglycerin for any cardiac shortness of breath.      She had a laceration to the occiput that will require staple remover at the end of this week.      From a cardiac standpoint she did have some elevated troponins about 3.336.  Some mild likely type 2 troponin leak.  Patient was somnolent that day and I think she was not perfusing her brain but she woke up and she is doing much better.  No intervention at this point because of age and family discussions.  Continue statin only.  No anticoagulation due to contraindication relative to the subdural.      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  Will continue her hydrocortisone at  the aforementioned dosing.  Will follow up on the sodium levels as an outpatient.    From a physical rehab standpoint the patient would like to rehab.  Will try our best to get her to the sniff unit.  But we had a lengthy discussion with the daughter Noreen who is power-of- and will transition her to palliative care and hospice has already been consulted for this reason to make the transition smooth.  Family is aware and agrees to this.  Patient was DNI DNR.    From a GI standpoint patient with occasional constipation will continue current p.r.n. meds.    I will follow back up with her my office in about a 2 weeks    I will follow up with a CMP towards the end of the week.  And I will get my NP Eve to remove the staples at the end of the week.  I was not comfortable removing just yet since her 89-year-old scalp is somewhat thin.  She respiratory had a lot of the pill and I did not want this to shear open..    Time spent with family 30 minutes.  All questions have been answered.     Goals of Care Treatment Preferences:  Code Status: DNR      Consults:   Consults (From admission, onward)          Status Ordering Provider     Inpatient consult to PICC team (XIMENA)  Once        Provider:  (Not yet assigned)    Acknowledged OMAR BOLTON III     Inpatient consult to Registered Dietitian/Nutritionist  Once        Provider:  (Not yet assigned)    Completed OMAR BOLTON III     IP consult to case management/social work  Once        Provider:  (Not yet assigned)    Acknowledged OMAR BOLTON III            No new Assessment & Plan notes have been filed under this hospital service since the last note was generated.  Service: Hospital Medicine    Final Active Diagnoses:    Diagnosis Date Noted POA    PRINCIPAL PROBLEM:  SDH (subdural hematoma) [S06.5XAA] 01/18/2024 Yes    Adrenal insufficiency, primary [E27.1] 01/30/2024 Yes    SOB (shortness of breath) [R06.02] 01/30/2024 No    Pruritus [L29.9]  01/29/2024 No    Cervical pain (neck) [M54.2] 01/21/2024 Yes    Scalp laceration [S01.01XA] 01/18/2024 Yes    Lower extremity pain, anterior, unspecified laterality [M79.606] 01/02/2024 Yes    Hyponatremia [E87.1] 12/26/2023 Yes    Encephalopathy, metabolic [G93.41] 12/26/2023 No      Problems Resolved During this Admission:    Diagnosis Date Noted Date Resolved POA    Elevated troponin I level [R79.89] 01/25/2024 01/30/2024 No    COVID-19 [U07.1] 01/18/2024 01/30/2024 Yes       Discharged Condition: fair    Disposition: Skilled Nursing Facility    Follow Up:   Follow-up Information       Tomer Samaniego III, MD Follow up in 2 week(s).    Specialty: Internal Medicine  Contact information:  [address]  [address]  [place] [address] [phone]                           Patient Instructions:   No discharge procedures on file.    Significant Diagnostic Studies: Labs: BMP:   Recent Labs   Lab 01/28/24 2026 01/29/24  0817 01/30/24  0800    135 133   K  --   --  4.6   CO2  --   --  24   BUN  --   --  7.0*   CREATININE  --   --  0.51*   CALCIUM  --   --  9.1   , CMP   Recent Labs   Lab 01/28/24 2026 01/29/24  0817 01/30/24  0800    135 133   K  --   --  4.6   CO2  --   --  24   BUN  --   --  7.0*   CREATININE  --   --  0.51*   CALCIUM  --   --  9.1   ALBUMIN  --   --  2.6*   BILITOT  --   --  0.6   ALKPHOS  --   --  124   AST  --   --  43*   ALT  --   --  49   , Troponin   Recent Labs   Lab 01/24/24  1616 01/25/24  0523 01/25/24  1601   TROPONINI 0.866* 3.338* 3.308*   , and All labs within the past 24 hours have been reviewed    Pending Diagnostic Studies:       None           Medications:  Transfer Medications (for Discharge Readmit only):   Current Facility-Administered Medications   Medication Dose Route Frequency Provider Last Rate Last Admin    acetaminophen tablet 650 mg  650 mg Oral Q8H WA Tomer Samaniego III, MD   650 mg at 01/29/24 1539    albuterol-ipratropium 2.5 mg-0.5 mg/3 mL nebulizer  solution 3 mL  3 mL Nebulization Q6H PRN Tomer Samaniego III, MD        amLODIPine tablet 5 mg  5 mg Oral Daily Williams Yo MD   5 mg at 01/29/24 0853    atorvastatin tablet 40 mg  40 mg Oral Daily Tomer Samaniego III, MD   40 mg at 01/29/24 0853    bisacodyL suppository 10 mg  10 mg Rectal Daily PRN Tomer Samaniego III, MD        bisacodyL suppository 10 mg  10 mg Rectal Daily Tomer Samaniego III, MD   10 mg at 01/27/24 0833    cetirizine tablet 10 mg  10 mg Oral Daily Tomer Samaniego III, MD   10 mg at 01/29/24 1128    docusate 50 mg/5 mL liquid 50 mg  50 mg Oral BID Tomer Samaniego III, MD   50 mg at 01/29/24 2011    gabapentin capsule 300 mg  300 mg Oral BID Tomer Samaniego III, MD   300 mg at 01/29/24 2011    hydrALAZINE injection 10 mg  10 mg Intravenous Q6H PRN Williams Yo MD        HYDROcodone-acetaminophen 5-325 mg per tablet 1 tablet  1 tablet Oral Q6H PRN Regine Chaudhary MD   1 tablet at 01/29/24 0418    hydrocortisone tablet 10 mg  10 mg Oral Daily Tomer Samaniego III, MD   10 mg at 01/29/24 0853    hydrocortisone tablet 5 mg  5 mg Oral Daily Tomer Samaniego III, MD   5 mg at 01/29/24 1414    magnesium oxide tablet 400 mg  400 mg Oral Daily Tomer Samaniego III, MD   400 mg at 01/29/24 0853    polyethylene glycol packet 17 g  17 g Oral Daily Tomer Samaniego III, MD   17 g at 01/29/24 0852    sodium chloride 0.9% flush 10 mL  10 mL Intravenous PRN Tomer Samaniego III, MD        sodium chloride 0.9% flush 10 mL  10 mL Intravenous Q6H Tomer Samaniego III, MD   10 mL at 01/30/24 0523    And    sodium chloride 0.9% flush 10 mL  10 mL Intravenous PRN Tomer Samaniego III, MD        sodium chloride oral tablet 1,000 mg  1,000 mg Oral BID Tomer Samaniego III, MD   1,000 mg at 01/29/24 2011     Facility-Administered Medications Ordered in Other Encounters   Medication Dose Route Frequency Provider Last Rate Last Admin    lactated ringers infusion    Intravenous Continuous Bayron Martines, DO           Indwelling Lines/Drains at time of discharge:   Lines/Drains/Airways       Peripherally Inserted Central Catheter Line  Duration             PICC Double Lumen 01/19/24 1728 right basilic 10 days              Drain  Duration             Female External Urinary Catheter w/ Suction 01/18/24 1445 11 days                    Time spent on the discharge of patient: 30 minutes         Tomer Samaniego III, MD  Department of Hospital Medicine  [facility] - Medical Surgical Unit

## 2024-01-30 NOTE — PLAN OF CARE
Problem: SLP  Goal: SLP Goal  Description: LTG:  -Pt will maintain adequate hydration/nutrition with optimum safety and efficiency of swallowing function on P.O. intake without overt signs and symptoms of aspiration for the highest appropriate diet level.    -Pt will utilize compensatory strategies with optimum safety and efficiency of swallowing function on P.O. intake without overt signs and symptoms of aspiration for the highest appropriate diet level.        STG:  -Pt will complete a Modified Barium Swallow Study to fully assess physiology and anatomy of the swallow and to determine the appropriate diet and/or rehabilitation exercises/POC.    -Pt will tolerate diet upgrade trials without signs and/or symptoms of aspiration with to safely least restrictive diet with (min/mod/max) verbal, visual and tactile cues.      -Pt will safely ingest diet trials during therapeutic feedings with the SLP without signs and/or symptoms of aspiration with to safely consume least restrictive diet with (min/mod/max) verbal, visual and tactile cues.    Outcome: Adequate for Care Transition

## 2024-01-30 NOTE — PT/OT/SLP DISCHARGE
Speech Language Pathology Discharge Summary    Bhavani Galarza  MRN: [id number]   SDH (subdural hematoma)         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        Status at initiation of therapy: 01/24/24    Treatment Area(s):  Swallow    Goals:   Multidisciplinary Problems       SLP Goals          Problem: SLP    Goal Priority Disciplines Outcome   SLP Goal     SLP Adequate for Care Transition   Description: LTG:  -Pt will maintain adequate hydration/nutrition with optimum safety and efficiency of swallowing function on P.O. intake without overt signs and symptoms of aspiration for the highest appropriate diet level.    -Pt will utilize compensatory strategies with optimum safety and efficiency of swallowing function on P.O. intake without overt signs and symptoms of aspiration for the highest appropriate diet level.        STG:  -Pt will complete a Modified Barium Swallow Study to fully assess physiology and anatomy of the swallow and to determine the appropriate diet and/or rehabilitation exercises/POC.    -Pt will tolerate diet upgrade trials without signs and/or symptoms of aspiration with to safely least restrictive diet with (min/mod/max) verbal, visual and tactile cues.      -Pt will safely ingest diet trials during therapeutic feedings with the SLP without signs and/or symptoms of aspiration with to safely consume least restrictive diet with (min/mod/max) verbal, visual and tactile cues.                         Participation in Treatment (at discharge):  Cooperative    Functional Status at time of Discharge:      Swallow: Patient demonstrates minimal dysphagia.                     Clinical Bedside assessment was completed on 01/24/24                       Instrumental assessment was not completed                                Swallowing Guidelines: Patient tolerating  mechanical soft             meals    Patient is discharged to Skilled nursing  facility    Marylu Calhoun MS, CCC-SLP  01/30/2024

## 2024-01-30 NOTE — PROGRESS NOTES
[facility]  [address] [address]  Phone: [phone]    (Hospital) Internal Medicine  Progress Note      PATIENT NAME: Bhavani Galarza  MRN: [id number]  TODAY'S DATE: 01/29/2024  ADMIT DATE: 1/18/2024    SUBJECTIVE     PRINCIPLE PROBLEM: SDH (subdural hematoma)    INTERVAL HISTORY:    1/29/2024  Over the weekend.  In my absence Friday through Sunday, Dr. Chaudhary was covering.  Patient with no more acute issues.  She was alert oriented back to her baseline.  Patient states that she feels like she wants more physical therapy.  The patient's sodium levels are also not improving.  This is despite 3% sodium and salt tablets.  Patient is having some shortness of breath per the daughter Noreen who is present this morning.  She states that that is intermittent.  Patient is eating very well and having active bowel movements.  She is also participating with physical therapy.    Patient was started on some hydrocortisone this morning.  The staff stated that she had some itching.  I was contacted by the nurse.  Started some Claritin.  This was her 3rd there was of morphine per the nurse.    Review of patient's allergies indicates:  No Known Allergies    ROS-14 point review of systems except as mentioned above    OBJECTIVE     VITAL SIGNS (Most Recent)  Temp: 97.9 °F (36.6 °C) (01/29/24 1948)  Pulse: 89 (01/29/24 1948)  Resp: 17 (01/29/24 1948)  BP: 126/78 (01/29/24 1948)  SpO2: (!) 94 % (01/29/24 1948)    VENTILATION STATUS  Resp: 17 (01/29/24 1948)  SpO2: (!) 94 % (01/29/24 1948)           I & O (Last 24H):  Intake/Output Summary (Last 24 hours) at 1/29/2024 1952  Last data filed at 1/29/2024 1800  Gross per 24 hour   Intake 1467.8 ml   Output 2900 ml   Net -1432.2 ml       WEIGHTS  Wt Readings from Last 3 Encounters:   01/29/24 0640 47.7 kg (105 lb 2.6 oz)   01/25/24 0559 42.9 kg (94 lb 9.2 oz)   01/24/24 0652 43.8 kg (96 lb 9 oz)   01/21/24 0002 45.3 kg (99 lb 13.9 oz)   01/20/24  "0414 41.9 kg (92 lb 6 oz)   01/19/24 0500 42.2 kg (93 lb 0.6 oz)   01/18/24 1535 42.2 kg (93 lb)   01/18/24 1235 42.2 kg (93 lb)   12/26/23 1719 45.4 kg (100 lb)   12/26/23 1050 45.4 kg (100 lb)   10/18/23 1343 40.8 kg (90 lb)       Physical Exam    Patient is alert and oriented x3 on physical exam.  Nurse Noreen is present.  Cranial nerves 2-12 are intact.  No JVD.  She is regular rate and rhythm with a grade 2 systolic murmur.  Lungs are clear to auscultation bilaterally abdomen is soft scaphoid she has +4/5 upper extremity left arm strength.  The right upper extremities 5/5.  Left side is 4/5 lower extremity right side is 5/5.    SCHEDULED MEDS:   acetaminophen  650 mg Oral Q8H WA    amLODIPine  5 mg Oral Daily    atorvastatin  40 mg Oral Daily    bisacodyL  10 mg Rectal Daily    cetirizine  10 mg Oral Daily    docusate  50 mg Oral BID    gabapentin  300 mg Oral BID    hydrocortisone  10 mg Oral Daily    hydrocortisone  5 mg Oral Daily    magnesium oxide  400 mg Oral Daily    polyethylene glycol  17 g Oral Daily    potassium chloride  40 mEq Oral BID    sodium chloride 0.9%  10 mL Intravenous Q6H    sodium chloride  1,000 mg Oral BID       CONTINUOUS INFUSIONS:    PRN MEDS:albuterol-ipratropium, bisacodyL, hydrALAZINE, HYDROcodone-acetaminophen, sodium chloride 0.9%, Flushing PICC/Midline Protocol **AND** sodium chloride 0.9% **AND** sodium chloride 0.9%    LABS AND DIAGNOSTICS     CBC LAST 3 DAYS  Recent Labs   Lab 01/26/24  0650 01/27/24  0814 01/28/24  0816   WBC 9.28 8.74 10.00   RBC 3.27* 3.68* 3.45*   HGB 10.4* 11.5* 10.8*   HCT 31.1* 34.3* 32.4*   MCV 95.1* 93.2 93.9   MCH 31.8* 31.3* 31.3*   MCHC 33.4 33.5 33.3   RDW 13.3 13.4 13.4    341 336   MPV 9.5 9.2 9.2       COAGULATION LAST 3 DAYS  No results for input(s): "LABPT", "INR", "APTT" in the last 168 hours.    CHEMISTRY LAST 3 DAYS  Recent Labs   Lab 01/25/24  0523 01/25/24  0802 01/26/24  0650 01/26/24  0803 01/27/24  0814 01/27/24 2009 " "01/28/24  0816 01/28/24 2026 01/29/24  0817      < > 137   < > 139   < > 138 136 135   K 3.4*  --  3.4*  --  3.6  --  4.1  --   --    CO2 21*  --  20*  --  22*  --  23  --   --    BUN 14.0  --  13.0  --  9.0*  --  8.0*  --   --    CREATININE 0.53*  --  0.46*  --  0.47*  --  0.45*  --   --    CALCIUM 8.2*  --  8.1*  --  8.4  --  8.5  --   --    MG 1.60  --   --   --  1.50*  --  1.50*  --   --    ALBUMIN 2.3*  --  2.2*  --  2.4*  --  2.5*  --   --    ALKPHOS 66  --  70  --  89  --  95  --   --    ALT 27  --  31  --  43  --  47  --   --    AST 42*  --  41*  --  46*  --  47*  --   --    BILITOT 0.6  --  0.3  --  0.3  --  0.5  --   --     < > = values in this interval not displayed.       CARDIAC PROFILE LAST 3 DAYS  Recent Labs   Lab 01/24/24  1616 01/25/24  0523 01/25/24  1601   BNP  --  1,729.5*  --      --   --    *  --   --    TROPONINI 0.866* 3.338* 3.308*       ENDOCRINE LAST 3 DAYS  No results for input(s): "TSH", "PROCAL" in the last 168 hours.    LAST ARTERIAL BLOOD GAS  ABG  No results for input(s): "PH", "PO2", "PCO2", "HCO3", "BE" in the last 168 hours.    LAST 7 DAYS MICROBIOLOGY   Microbiology Results (last 7 days)       ** No results found for the last 168 hours. **            MOST RECENT IMAGING  X-Ray Chest 1 View  Narrative: EXAMINATION:  XR CHEST 1 VIEW    CLINICAL HISTORY:  hypoxemia, sob;, .    COMPARISON:  01/24/2024    FINDINGS:  An AP view or more reveals the heart to be normal in size.  There is hazy opacification of the right lung and left mid and lower lung which appears to have mildly progressed since the prior exam.  Right PICC line is unchanged in position.  The trachea is mostly midline.  Impression: 1. Hazy opacification of the lungs bilaterally from the sparing of the left upper lobe with suspect the mild progression since the prior exam suggesting progressing infiltrate, atelectasis, and pleural reaction  2. Atherosclerosis  3. Right PICC line    Electronically " signed by: Phill Giles  Date:    01/29/2024  Time:    11:20      [facility]  Results for orders placed during the hospital encounter of 12/26/23    Echo    Interpretation Summary    Right Ventricle: Right ventricle was not well visualized due to poor acoustic window. Normal right ventricular cavity size. Systolic function is normal.    Left Atrium: Left atrium is mildly dilated.    Right Atrium: Right atrium is mildly dilated.    Aortic Valve: Moderately calcified cusps. There is moderate stenosis. Aortic valve peak velocity is 2.85 m/s. There is moderate to severe aortic regurgitation. AR PHTN 274 msec.    If clinically indicated a ALMAS can be helpful to further assess the severity of AV disease.    Left Ventricle: The left ventricle is normal in size. There is mild concentric hypertrophy. There is normal systolic function with a visually estimated ejection fraction of 55 - 60%.    Mitral Valve: Moderately thickened leaflets. Moderately calcified leaflets. There is mild stenosis. The mean pressure gradient across the mitral valve is 6 mmHg at a heart rate of  bpm. There is mild to moderate regurgitation.    Tricuspid Valve: There is no stenosis. There is mild regurgitation.    Pulmonic Valve: There is no stenosis.    Pleural effusion is present which can be better assessed by dedicated chest imaging.      CURRENT/PREVIOUS VISIT EKG  Results for orders placed or performed during the hospital encounter of 01/18/24   EKG 12-lead    Collection Time: 01/25/24  9:13 AM    Narrative    Test Reason : I63.9,    Vent. Rate : 094 BPM     Atrial Rate : 094 BPM     P-R Int : 184 ms          QRS Dur : 108 ms      QT Int : 390 ms       P-R-T Axes : 083 265 087 degrees     QTc Int : 487 ms    Normal sinus rhythm  Nonspecific ST abnormality  Abnormal ECG  When compared with ECG of 27-DEC-2023 12:10,  No significant change was found  Confirmed by Bran Montalvo MD ([id number]) on 1/25/2024 11:29:20 AM    Referred By: BABS   SELF            Confirmed By:Bran Montalvo MD       ASSESSMENT/PLAN:     Active Hospital Problems    Diagnosis    *SDH (subdural hematoma)    Pruritus    Elevated troponin I level    Cervical pain (neck)     C-spine x-ray , pt consulted  Start tramadol prn      Scalp laceration    COVID-19     Will get some markers in the morning.  Patient vaccination status unclear.  Will check with my lab in the morning office.  Will get COVID markers in the morning.  Patient was not requiring oxygen at this point.  To low risk because of age.      Lower extremity pain, anterior, unspecified laterality    Hyponatremia    Encephalopathy, metabolic       ASSESSMENT & PLAN:   Will give the nurses to do t.i.d. hydrogen peroxide to this gallops we can consider removing staples.    With regards to the pruritus differential is likely opiate induced.  Started her on some Claritin.  Less likely hydrocortisone which has intrinsic effects with the pruritus.  Less likely etiology.     Cardiac issues stable.      Cervical pain improving.      COVID-19 out of isolation today.  Doing well from that standpoint may correlate some shortness of breath but shortness for breath could also be vascular in nature.      Hyponatremia I have started her empirically due to paucity of time for testing.  Put her on some hydrocortisone 10/5:00 a.m./p.m. will monitor closely.  Will monitor sodium levels even after discharge tomorrow perhaps at the nursing home while she is on this sniff unit.      Encephalopathy is improved.      RECOMMENDATIONS:  Lengthy discussion regarding patient's essential desire to improve with her bodies lagging.  Daughter and I had a very emotional but appropriate conversation and the difficulty of her managing these issues remotely where she lives up in [place].  I do think that the patient's body is lagging in the patient's mind wants to improve; however, due to her age 89 we are going to give her the benefit of the doubt get her to skilled at  the nursing home and then we have approved through the nursing home and coordinated through hospice for them to scope in and assumed care should the patient decline if failing therapy.  Daughter is in agreement with this.        Tomer Samaniego III, MD  Department of Hospital Medicine ([facility])   Date of Service: 01/29/2024  7:49 PM

## 2024-01-30 NOTE — NURSING
"I faxed over discharge summary to [facility] and rehabilitation addressed to nurse "maribel smith lpn for review tiana discharged.  "

## 2024-01-30 NOTE — PLAN OF CARE
Problem: Adult Inpatient Plan of Care  Goal: Plan of Care Review  Outcome: Ongoing, Progressing  Goal: Patient-Specific Goal (Individualized)  Outcome: Ongoing, Progressing  Goal: Absence of Hospital-Acquired Illness or Injury  Outcome: Ongoing, Progressing  Goal: Optimal Comfort and Wellbeing  Outcome: Ongoing, Progressing  Goal: Readiness for Transition of Care  Outcome: Ongoing, Progressing     Problem: Skin Injury Risk Increased  Goal: Skin Health and Integrity  Outcome: Ongoing, Progressing     Problem: Fall Injury Risk  Goal: Absence of Fall and Fall-Related Injury  Outcome: Ongoing, Progressing     Problem: Pain Acute  Goal: Acceptable Pain Control and Functional Ability  Outcome: Ongoing, Progressing     Problem: Adjustment to Illness (Stroke, Hemorrhagic)  Goal: Optimal Coping  Outcome: Ongoing, Progressing     Problem: Bowel Elimination Impaired (Stroke, Hemorrhagic)  Goal: Effective Bowel Elimination  Outcome: Ongoing, Progressing     Problem: Cerebral Tissue Perfusion (Stroke, Hemorrhagic)  Goal: Optimal Cerebral Tissue Perfusion  Outcome: Ongoing, Progressing     Problem: Cognitive Impairment (Stroke, Hemorrhagic)  Goal: Optimal Cognitive Function  Outcome: Ongoing, Progressing     Problem: Communication Impairment (Stroke, Hemorrhagic)  Goal: Effective Communication Skills  Outcome: Ongoing, Progressing     Problem: Functional Ability Impaired (Stroke, Hemorrhagic)  Goal: Optimal Functional Ability  Outcome: Ongoing, Progressing     Problem: Pain (Stroke, Hemorrhagic)  Goal: Acceptable Pain Control  Outcome: Ongoing, Progressing     Problem: Respiratory Compromise (Stroke, Hemorrhagic)  Goal: Effective Oxygenation and Ventilation  Outcome: Ongoing, Progressing     Problem: Sensorimotor Impairment (Stroke, Hemorrhagic)  Goal: Improved Sensorimotor Function  Outcome: Ongoing, Progressing     Problem: Swallowing Impairment (Stroke, Hemorrhagic)  Goal: Optimal Eating and Swallowing Without  Aspiration  Outcome: Ongoing, Progressing     Problem: Urinary Elimination Impaired (Stroke, Hemorrhagic)  Goal: Effective Urinary Elimination  Outcome: Ongoing, Progressing     Problem: Infection  Goal: Absence of Infection Signs and Symptoms  Outcome: Ongoing, Progressing     Problem: Impaired Wound Healing  Goal: Optimal Wound Healing  Outcome: Ongoing, Progressing

## 2024-01-31 ENCOUNTER — LAB REQUISITION (OUTPATIENT)
Dept: LAB | Facility: HOSPITAL | Age: 89
End: 2024-01-31
Payer: MEDICARE

## 2024-01-31 DIAGNOSIS — I42.9 CARDIOMYOPATHY, UNSPECIFIED: ICD-10-CM

## 2024-01-31 DIAGNOSIS — E87.1 HYPO-OSMOLALITY AND HYPONATREMIA: ICD-10-CM

## 2024-01-31 DIAGNOSIS — R53.1 WEAKNESS: ICD-10-CM

## 2024-01-31 DIAGNOSIS — R94.31 ABNORMAL ELECTROCARDIOGRAM (ECG) (EKG): ICD-10-CM

## 2024-01-31 LAB
ALBUMIN SERPL-MCNC: 2.6 G/DL (ref 3.4–4.8)
ALBUMIN/GLOB SERPL: 0.7 RATIO (ref 1.1–2)
ALP SERPL-CCNC: 123 UNIT/L (ref 40–150)
ALT SERPL-CCNC: 42 UNIT/L (ref 0–55)
AST SERPL-CCNC: 38 UNIT/L (ref 5–34)
BILIRUB SERPL-MCNC: 0.4 MG/DL
BNP BLD-MCNC: 652.6 PG/ML
BUN SERPL-MCNC: 12 MG/DL (ref 9.8–20.1)
CALCIUM SERPL-MCNC: 8.8 MG/DL (ref 8.4–10.2)
CHLORIDE SERPL-SCNC: 100 MMOL/L (ref 98–111)
CO2 SERPL-SCNC: 26 MMOL/L (ref 23–31)
CREAT SERPL-MCNC: 0.5 MG/DL (ref 0.55–1.02)
ERYTHROCYTE [DISTWIDTH] IN BLOOD BY AUTOMATED COUNT: 13.2 % (ref 11.5–17)
GFR SERPLBLD CREATININE-BSD FMLA CKD-EPI: >60 MLS/MIN/1.73/M2
GLOBULIN SER-MCNC: 3.5 GM/DL (ref 2.4–3.5)
GLUCOSE SERPL-MCNC: 98 MG/DL (ref 75–121)
HCT VFR BLD AUTO: 30.6 % (ref 37–47)
HGB BLD-MCNC: 10.7 G/DL (ref 12–16)
MCH RBC QN AUTO: 31.3 PG (ref 27–31)
MCHC RBC AUTO-ENTMCNC: 35 G/DL (ref 33–36)
MCV RBC AUTO: 89.5 FL (ref 80–94)
OSMOLALITY SERPL: 281 MOSM/KG (ref 280–300)
PLATELET # BLD AUTO: 398 X10(3)/MCL (ref 130–400)
PMV BLD AUTO: 8.9 FL (ref 7.4–10.4)
POTASSIUM SERPL-SCNC: 3.9 MMOL/L (ref 3.5–5.1)
PROT SERPL-MCNC: 6.1 GM/DL (ref 5.8–7.6)
RBC # BLD AUTO: 3.42 X10(6)/MCL (ref 4.2–5.4)
SODIUM SERPL-SCNC: 133 MMOL/L (ref 132–146)
WBC # SPEC AUTO: 9.84 X10(3)/MCL (ref 4.5–11.5)

## 2024-01-31 PROCEDURE — 80053 COMPREHEN METABOLIC PANEL: CPT | Performed by: INTERNAL MEDICINE

## 2024-01-31 PROCEDURE — 83930 ASSAY OF BLOOD OSMOLALITY: CPT | Performed by: INTERNAL MEDICINE

## 2024-01-31 PROCEDURE — 85027 COMPLETE CBC AUTOMATED: CPT | Performed by: INTERNAL MEDICINE

## 2024-01-31 PROCEDURE — 83880 ASSAY OF NATRIURETIC PEPTIDE: CPT | Performed by: INTERNAL MEDICINE

## 2024-02-10 ENCOUNTER — LAB REQUISITION (OUTPATIENT)
Dept: LAB | Facility: HOSPITAL | Age: 89
End: 2024-02-10
Payer: MEDICARE

## 2024-02-10 DIAGNOSIS — N39.0 URINARY TRACT INFECTION, SITE NOT SPECIFIED: ICD-10-CM

## 2024-02-10 LAB
APPEARANCE UR: CLEAR
BACTERIA #/AREA URNS AUTO: NORMAL /HPF
BILIRUB UR QL STRIP.AUTO: NEGATIVE
COLOR UR AUTO: YELLOW
GLUCOSE UR QL STRIP.AUTO: NEGATIVE
KETONES UR QL STRIP.AUTO: NEGATIVE
LEUKOCYTE ESTERASE UR QL STRIP.AUTO: NEGATIVE
NITRITE UR QL STRIP.AUTO: NEGATIVE
PH UR STRIP.AUTO: 7 [PH]
PROT UR QL STRIP.AUTO: NEGATIVE
RBC #/AREA URNS AUTO: NORMAL /HPF
RBC UR QL AUTO: ABNORMAL
SP GR UR STRIP.AUTO: 1.01 (ref 1–1.03)
SQUAMOUS #/AREA URNS AUTO: NORMAL /HPF
UROBILINOGEN UR STRIP-ACNC: 0.2
WBC #/AREA URNS AUTO: NORMAL /HPF

## 2024-02-10 PROCEDURE — 87086 URINE CULTURE/COLONY COUNT: CPT | Performed by: INTERNAL MEDICINE

## 2024-02-10 PROCEDURE — 81003 URINALYSIS AUTO W/O SCOPE: CPT | Performed by: INTERNAL MEDICINE

## 2024-02-13 LAB — BACTERIA UR CULT: NO GROWTH

## 2024-03-24 ENCOUNTER — LAB REQUISITION (OUTPATIENT)
Dept: LAB | Facility: HOSPITAL | Age: 89
End: 2024-03-24
Payer: MEDICARE

## 2024-03-24 DIAGNOSIS — N39.0 URINARY TRACT INFECTION, SITE NOT SPECIFIED: ICD-10-CM

## 2024-03-24 LAB
AMORPH URATE CRY URNS QL MICRO: ABNORMAL /HPF
APPEARANCE UR: CLEAR
BACTERIA #/AREA URNS AUTO: ABNORMAL /HPF
BILIRUB UR QL STRIP.AUTO: NEGATIVE
COLOR UR AUTO: YELLOW
GLUCOSE UR QL STRIP.AUTO: NEGATIVE
KETONES UR QL STRIP.AUTO: NEGATIVE
LEUKOCYTE ESTERASE UR QL STRIP.AUTO: NEGATIVE
MUCOUS THREADS URNS QL MICRO: ABNORMAL /LPF
NITRITE UR QL STRIP.AUTO: NEGATIVE
PH UR STRIP.AUTO: 6.5 [PH]
PROT UR QL STRIP.AUTO: NEGATIVE
RBC #/AREA URNS AUTO: ABNORMAL /HPF
RBC UR QL AUTO: ABNORMAL
SP GR UR STRIP.AUTO: 1.02 (ref 1–1.03)
SQUAMOUS #/AREA URNS AUTO: ABNORMAL /HPF
UROBILINOGEN UR STRIP-ACNC: 0.2
WBC #/AREA URNS AUTO: ABNORMAL /HPF

## 2024-03-24 PROCEDURE — 87086 URINE CULTURE/COLONY COUNT: CPT | Performed by: INTERNAL MEDICINE

## 2024-03-24 PROCEDURE — 81003 URINALYSIS AUTO W/O SCOPE: CPT | Performed by: INTERNAL MEDICINE

## 2024-03-27 LAB — BACTERIA UR CULT: NO GROWTH

## 2024-03-28 ENCOUNTER — HOSPITAL ENCOUNTER (EMERGENCY)
Facility: HOSPITAL | Age: 89
Discharge: LEFT BEFORE DISCHARGE | End: 2024-03-28
Attending: FAMILY MEDICINE
Payer: MEDICARE

## 2024-03-28 VITALS
RESPIRATION RATE: 22 BRPM | BODY MASS INDEX: 17.47 KG/M2 | WEIGHT: 89 LBS | OXYGEN SATURATION: 99 % | DIASTOLIC BLOOD PRESSURE: 85 MMHG | TEMPERATURE: 97 F | HEART RATE: 87 BPM | HEIGHT: 60 IN | SYSTOLIC BLOOD PRESSURE: 134 MMHG

## 2024-03-28 DIAGNOSIS — Z71.1 PERSON WITH FEARED HEALTH COMPLAINT IN WHOM NO DIAGNOSIS IS MADE: Primary | ICD-10-CM

## 2024-03-28 PROCEDURE — 99283 EMERGENCY DEPT VISIT LOW MDM: CPT

## 2024-03-28 NOTE — ED PROVIDER NOTES
Encounter Date: 3/28/2024       History     Chief Complaint   Patient presents with    Altered Mental Status     Sent from [facility] for AMS and babbled speech. PT denies and states she did not want to come to ER.      Patient is a 89-year-old female patient.  History of possible previous CVAs.  Patient had a less than 30 minute episode today where she may have had trouble speaking.  Patient is DNR.  Patient was resolved and back to normal before EMS arrived at [facility].  Patient states she did not want to come to the hospital.  Patient was brought here against her wishes.  In the room patient is awake alert oriented x4.  Patient states she does not want to be worked up.  Patient does not want any lab work or imaging performed.  Patient does not want to be seen.  Patient is signing an AMA form.  Patient is awake alert sober and able to make her decisions and declining care if she wants to.  Patient states specifically that it is that she is 89 years old, things are what they are, and that she does not want to medical community to make a big deal about her right now.    I will respect this patient's wishes.  She will sign an AMA form.  We will discharge her back to the nursing home.        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      Past Surgical History:   Procedure Laterality Date    BACK SURGERY      COLONOSCOPY      X 2    EPIDURAL STEROID INJECTION INTO LUMBAR SPINE Right 10/20/2023    Procedure: INJECTION, STEROID, SPINE, LUMBAR, EPIDURAL (Right L5- S1 ILESI);  Surgeon: Amrit Song MD;  Location: [facility] OR;  Service: Pain Management;  Laterality: Right;    TONSILLECTOMY      TRANSFORAMINAL EPIDURAL INJECTION OF STEROID Right 09/22/2023    Procedure: INJECTION, STEROID, EPIDURAL, TRANSFORAMINAL APPROACH (Right TFESI L4 & L5);  Surgeon: Amrit Song MD;  Location: [facility] OR;   Service: Pain Management;  Laterality: Right;     Family History   Problem Relation Age of Onset    No Known Problems Mother     Heart attack Father      Social History     Tobacco Use    Smoking status: Never    Smokeless tobacco: Never   Substance Use Topics    Alcohol use: Not Currently    Drug use: Never     Review of Systems   All other systems reviewed and are negative.      Physical Exam     Initial Vitals [03/28/24 1224]   BP Pulse Resp Temp SpO2   134/85 87 (!) 22 97.2 °F (36.2 °C) 99 %      MAP       --         Physical Exam    Nursing note and vitals reviewed.  Constitutional: She appears well-developed and well-nourished.   HENT:   Head: Normocephalic and atraumatic.   Neck: Neck supple.   Cardiovascular:  Normal rate and regular rhythm.           Pulmonary/Chest: Breath sounds normal.   Abdominal: Abdomen is soft.   Musculoskeletal:      Cervical back: Neck supple.     Neurological: She is alert and oriented to person, place, and time.   Skin: Skin is warm.   Psychiatric: She has a normal mood and affect. Thought content normal.         ED Course   Procedures  Labs Reviewed - No data to display       Imaging Results    None          Medications - No data to display  Medical Decision Making                                    Clinical Impression:  Final diagnoses:  [Z71.1] Person with feared health complaint in whom no diagnosis is made (Primary)          ED Disposition Condition    Discharge Stable          ED Prescriptions    None       Follow-up Information    None          Tomer Nicole Jr., MD  03/28/24 1234

## 2024-04-01 PROBLEM — I63.511 ACUTE ISCHEMIC RIGHT MIDDLE CEREBRAL ARTERY (MCA) STROKE: Status: RESOLVED | Noted: 2023-12-28 | Resolved: 2024-04-01

## 2024-05-08 ENCOUNTER — LAB REQUISITION (OUTPATIENT)
Dept: LAB | Facility: HOSPITAL | Age: 89
End: 2024-05-08
Payer: MEDICARE

## 2024-05-08 DIAGNOSIS — I10 ESSENTIAL (PRIMARY) HYPERTENSION: ICD-10-CM

## 2024-05-08 DIAGNOSIS — Z13.29 ENCOUNTER FOR SCREENING FOR OTHER SUSPECTED ENDOCRINE DISORDER: ICD-10-CM

## 2024-05-08 DIAGNOSIS — I69.354 HEMIPLEGIA AND HEMIPARESIS FOLLOWING CEREBRAL INFARCTION AFFECTING LEFT NON-DOMINANT SIDE: ICD-10-CM

## 2024-05-08 LAB
ALBUMIN SERPL-MCNC: 3.1 G/DL (ref 3.4–4.8)
ALBUMIN/GLOB SERPL: 1.1 RATIO (ref 1.1–2)
ALP SERPL-CCNC: 142 UNIT/L (ref 40–150)
ALT SERPL-CCNC: 14 UNIT/L (ref 0–55)
AST SERPL-CCNC: 19 UNIT/L (ref 5–34)
BILIRUB SERPL-MCNC: 0.2 MG/DL
BUN SERPL-MCNC: 22 MG/DL (ref 9.8–20.1)
CALCIUM SERPL-MCNC: 8.4 MG/DL (ref 8.4–10.2)
CHLORIDE SERPL-SCNC: 102 MMOL/L (ref 98–111)
CO2 SERPL-SCNC: 26 MMOL/L (ref 23–31)
CREAT SERPL-MCNC: 0.63 MG/DL (ref 0.55–1.02)
ERYTHROCYTE [DISTWIDTH] IN BLOOD BY AUTOMATED COUNT: 13.5 % (ref 11.5–17)
GFR SERPLBLD CREATININE-BSD FMLA CKD-EPI: >60 ML/MIN/1.73/M2
GLOBULIN SER-MCNC: 2.9 GM/DL (ref 2.4–3.5)
GLUCOSE SERPL-MCNC: 145 MG/DL (ref 75–121)
HCT VFR BLD AUTO: 35.1 % (ref 37–47)
HGB BLD-MCNC: 11.6 G/DL (ref 12–16)
MCH RBC QN AUTO: 30.1 PG (ref 27–31)
MCHC RBC AUTO-ENTMCNC: 33 G/DL (ref 33–36)
MCV RBC AUTO: 91.2 FL (ref 80–94)
PLATELET # BLD AUTO: 319 X10(3)/MCL (ref 130–400)
PMV BLD AUTO: 9.6 FL (ref 7.4–10.4)
POTASSIUM SERPL-SCNC: 4.5 MMOL/L (ref 3.5–5.1)
PROT SERPL-MCNC: 6 GM/DL (ref 5.8–7.6)
RBC # BLD AUTO: 3.85 X10(6)/MCL (ref 4.2–5.4)
SODIUM SERPL-SCNC: 136 MMOL/L (ref 132–146)
TSH SERPL-ACNC: 0.94 UIU/ML (ref 0.35–4.94)
WBC # SPEC AUTO: 7.46 X10(3)/MCL (ref 4.5–11.5)

## 2024-05-08 PROCEDURE — 85027 COMPLETE CBC AUTOMATED: CPT | Performed by: INTERNAL MEDICINE

## 2024-05-08 PROCEDURE — 80053 COMPREHEN METABOLIC PANEL: CPT | Performed by: INTERNAL MEDICINE

## 2024-05-08 PROCEDURE — 84443 ASSAY THYROID STIM HORMONE: CPT | Performed by: INTERNAL MEDICINE

## 2024-12-14 ENCOUNTER — HOSPITAL ENCOUNTER (EMERGENCY)
Facility: HOSPITAL | Age: 89
Discharge: HOME | End: 2024-12-14
Attending: INTERNAL MEDICINE
Payer: MEDICARE

## 2024-12-14 VITALS
HEART RATE: 88 BPM | DIASTOLIC BLOOD PRESSURE: 76 MMHG | SYSTOLIC BLOOD PRESSURE: 140 MMHG | BODY MASS INDEX: 18.25 KG/M2 | WEIGHT: 103 LBS | RESPIRATION RATE: 16 BRPM | OXYGEN SATURATION: 97 % | TEMPERATURE: 98 F | HEIGHT: 63 IN

## 2024-12-14 DIAGNOSIS — R35.0 INCREASED URINARY FREQUENCY: Primary | ICD-10-CM

## 2024-12-14 DIAGNOSIS — R39.198 INCREASING RESIDUAL URINE: ICD-10-CM

## 2024-12-14 LAB
ALBUMIN SERPL-MCNC: 3.4 G/DL (ref 3.4–4.8)
ALBUMIN/GLOB SERPL: 0.9 RATIO (ref 1.1–2)
ALP SERPL-CCNC: 112 UNIT/L (ref 40–150)
ALT SERPL-CCNC: 14 UNIT/L (ref 0–55)
AMORPH URATE CRY URNS QL MICRO: ABNORMAL /HPF
ANION GAP SERPL CALC-SCNC: 10 MEQ/L
AST SERPL-CCNC: 20 UNIT/L (ref 5–34)
BACTERIA #/AREA URNS AUTO: ABNORMAL /HPF
BASOPHILS # BLD AUTO: 0.05 X10(3)/MCL
BASOPHILS NFR BLD AUTO: 0.8 %
BILIRUB SERPL-MCNC: 0.4 MG/DL
BILIRUB UR QL STRIP.AUTO: NEGATIVE
BUN SERPL-MCNC: 17 MG/DL (ref 9.8–20.1)
CALCIUM SERPL-MCNC: 9.2 MG/DL (ref 8.4–10.2)
CHLORIDE SERPL-SCNC: 100 MMOL/L (ref 98–111)
CLARITY UR: ABNORMAL
CO2 SERPL-SCNC: 25 MMOL/L (ref 23–31)
COLOR UR AUTO: YELLOW
CREAT SERPL-MCNC: 0.72 MG/DL (ref 0.55–1.02)
CREAT/UREA NIT SERPL: 24
EOSINOPHIL # BLD AUTO: 0.12 X10(3)/MCL (ref 0–0.9)
EOSINOPHIL NFR BLD AUTO: 1.9 %
ERYTHROCYTE [DISTWIDTH] IN BLOOD BY AUTOMATED COUNT: 12.9 % (ref 11.5–17)
GFR SERPLBLD CREATININE-BSD FMLA CKD-EPI: >60 ML/MIN/1.73/M2
GLOBULIN SER-MCNC: 3.7 GM/DL (ref 2.4–3.5)
GLUCOSE SERPL-MCNC: 138 MG/DL (ref 75–121)
GLUCOSE UR QL STRIP: NEGATIVE
HCT VFR BLD AUTO: 42.5 % (ref 37–47)
HGB BLD-MCNC: 14.1 G/DL (ref 12–16)
HGB UR QL STRIP: ABNORMAL
IMM GRANULOCYTES # BLD AUTO: 0.02 X10(3)/MCL (ref 0–0.04)
IMM GRANULOCYTES NFR BLD AUTO: 0.3 %
KETONES UR QL STRIP: NEGATIVE
LEUKOCYTE ESTERASE UR QL STRIP: NEGATIVE
LYMPHOCYTES # BLD AUTO: 1.13 X10(3)/MCL (ref 0.6–4.6)
LYMPHOCYTES NFR BLD AUTO: 18.3 %
MCH RBC QN AUTO: 31.3 PG (ref 27–31)
MCHC RBC AUTO-ENTMCNC: 33.2 G/DL (ref 33–36)
MCV RBC AUTO: 94.4 FL (ref 80–94)
MONOCYTES # BLD AUTO: 0.48 X10(3)/MCL (ref 0.1–1.3)
MONOCYTES NFR BLD AUTO: 7.8 %
NEUTROPHILS # BLD AUTO: 4.36 X10(3)/MCL (ref 2.1–9.2)
NEUTROPHILS NFR BLD AUTO: 70.9 %
NITRITE UR QL STRIP: NEGATIVE
PH UR STRIP: 7 [PH]
PLATELET # BLD AUTO: 291 X10(3)/MCL (ref 130–400)
PMV BLD AUTO: 8.9 FL (ref 7.4–10.4)
POTASSIUM SERPL-SCNC: 4.3 MMOL/L (ref 3.5–5.1)
PROT SERPL-MCNC: 7.1 GM/DL (ref 5.8–7.6)
PROT UR QL STRIP: NEGATIVE
RBC # BLD AUTO: 4.5 X10(6)/MCL (ref 4.2–5.4)
RBC #/AREA URNS AUTO: ABNORMAL /HPF
SODIUM SERPL-SCNC: 135 MMOL/L (ref 136–145)
SP GR UR STRIP.AUTO: 1.01 (ref 1–1.03)
SQUAMOUS #/AREA URNS AUTO: ABNORMAL /HPF
UROBILINOGEN UR STRIP-ACNC: 0.2
WBC # BLD AUTO: 6.16 X10(3)/MCL (ref 4.5–11.5)
WBC #/AREA URNS AUTO: ABNORMAL /HPF

## 2024-12-14 PROCEDURE — 81003 URINALYSIS AUTO W/O SCOPE: CPT | Performed by: INTERNAL MEDICINE

## 2024-12-14 PROCEDURE — 85025 COMPLETE CBC W/AUTO DIFF WBC: CPT | Performed by: INTERNAL MEDICINE

## 2024-12-14 PROCEDURE — 80053 COMPREHEN METABOLIC PANEL: CPT | Performed by: INTERNAL MEDICINE

## 2024-12-14 PROCEDURE — 99283 EMERGENCY DEPT VISIT LOW MDM: CPT

## 2024-12-14 NOTE — ED PROVIDER NOTES
Encounter Date: 12/14/2024  History from sitter, patient does not talk much, she did say hello but then she would not give any other complaints     History     Chief Complaint   Patient presents with    Urinary Frequency     Urinary frequency and not sleeping well at night for 5 days     HPI    Bhavani Galarza is 89 y.o. 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 claudication      Past Surgical History:   Procedure Laterality Date    BACK SURGERY      COLONOSCOPY      X 2    EPIDURAL STEROID INJECTION INTO LUMBAR SPINE Right 10/20/2023    Procedure: INJECTION, STEROID, SPINE, LUMBAR, EPIDURAL (Right L5- S1 ILESI);  Surgeon: Amrit Song MD;  Location: [facility];  Service: Pain Management;  Laterality: Right;    TONSILLECTOMY      TRANSFORAMINAL EPIDURAL INJECTION OF STEROID Right 09/22/2023    Procedure: INJECTION, STEROID, EPIDURAL, TRANSFORAMINAL APPROACH (Right TFESI L4 & L5);  Surgeon: Amrit Song MD;  Location: [facility];  Service: Pain Management;  Laterality: Right;     Family History   Problem Relation Name Age of Onset    No Known Problems Mother      Heart attack Father       Social History     Tobacco Use    Smoking status: Never    Smokeless tobacco: Never   Substance Use Topics    Alcohol use: Not Currently    Drug use: Never     Review of Systems   Unable to perform ROS: Other (Patient does not talk much)   Genitourinary:  Positive for frequency.       Physical Exam     Initial Vitals [12/14/24 0905]   BP Pulse Resp Temp SpO2   (!) 140/76 94 16 97.5 °F (36.4 °C) 97 %      MAP       --         Physical Exam    Nursing note and vitals  reviewed.  Constitutional: She appears well-developed.   HENT:   Head: Atraumatic.   Eyes: EOM are normal.   Neck: Neck supple.   Cardiovascular:  Normal rate.           Murmur heard.  Pulmonary/Chest: Breath sounds normal. No respiratory distress. She has no wheezes.   Abdominal: Abdomen is soft. Bowel sounds are normal. She exhibits no distension. There is no abdominal tenderness. There is no rebound and no guarding.   Musculoskeletal:         General: No tenderness or edema.      Cervical back: Neck supple.     Neurological: She is alert.   Skin: Skin is warm and dry.         ED Course   Procedures  Orders Placed This Encounter    CBC auto differential    Comprehensive metabolic panel    Urinalysis, Reflex to Urine Culture    CBC with Differential    Urinalysis, Microscopic       Labs Reviewed   COMPREHENSIVE METABOLIC PANEL - Abnormal       Result Value    Sodium 135 (*)     Potassium 4.3      Chloride 100      CO2 25      Glucose 138 (*)     Blood Urea Nitrogen 17.0      Creatinine 0.72      Calcium 9.2      Protein Total 7.1      Albumin 3.4      Globulin 3.7 (*)     Albumin/Globulin Ratio 0.9 (*)     Bilirubin Total 0.4            ALT 14      AST 20      eGFR >60      Anion Gap 10.0      BUN/Creatinine Ratio 24     URINALYSIS, REFLEX TO URINE CULTURE - Abnormal    Color, UA Yellow      Appearance, UA Slightly Cloudy (*)     Specific Gravity, UA 1.015      pH, UA 7.0      Protein, UA Negative      Glucose, UA Negative      Ketones, UA Negative      Blood, UA 1+ (*)     Bilirubin, UA Negative      Urobilinogen, UA 0.2      Nitrites, UA Negative      Leukocyte Esterase, UA Negative     CBC WITH DIFFERENTIAL - Abnormal    WBC 6.16      RBC 4.50      Hgb 14.1      Hct 42.5      MCV 94.4 (*)     MCH 31.3 (*)     MCHC 33.2      RDW 12.9      Platelet 291      MPV 8.9      Neut % 70.9      Lymph % 18.3      Mono % 7.8      Eos % 1.9      Basophil % 0.8      Lymph # 1.13      Neut # 4.36      Mono # 0.48       Eos # 0.12      Baso # 0.05      IG# 0.02      IG% 0.3     URINALYSIS, MICROSCOPIC - Abnormal    Bacteria, UA Occasional      Amporphous Urate Crystals, UA Few (*)     RBC, UA 0-2      WBC, UA None Seen      Squamous Epithelial Cells, UA Occasional     CBC W/ AUTO DIFFERENTIAL    Narrative:     The following orders were created for panel order CBC auto differential.  Procedure                               Abnormality         Status                     ---------                               -----------         ------                     CBC with Differential[[id number]]       Abnormal            Final result                 Please view results for these tests on the individual orders.          Imaging Results    None          Medications - No data to display  Medical Decision Making    Bhavani Galarza is 89 y.o. 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 arrives in ER with c/o Urinary Frequency (Urinary frequency and not sleeping well at night for 5 days)    Patient has sitter brings her to the emergency room saying that she keeps wanting to go to the toilet every 10 minutes, and her family doctor had started her on a medicines couple of weeks back to see if that helps, but it is not helping, she was having frequency for the last 2 or 3 weeks, and the sitter felt that family doctor should have started her on antibiotics, but patient has been going almost every 10 minutes now even while she is eating she would stop eating and would like to go to the toilet, she has not been sleeping for the last 5 days so today the other sitter called this sitter to let her know that she is going to the toilet every 10 minutes so they decided to bring her to the emergency room to get checked.    Patient does not talk much, she did say hello when I enter the room and then she would just stay aloof, sitter says that she is having hearing problem, even on  asking questions patient would not give any answer, she is looking around, she is able to move all 4 extremities, according to sitter she is able to transfer herself, but because she has been having some problem with the leg pain for a few months and she getting physical therapy so they trying to keep her in the wheelchair.    Patient was able to sit up with minimal help and actually go to the toilet to give a urine sample.      Amount and/or Complexity of Data Reviewed  Labs: ordered.               ED Course as of 12/14/24 1011   Sat Dec 14, 2024   1008 Patient's workup in the emergency room does not reveal any major urinary tract infection, or any other major abnormality, she does have 129 cc of urine after urinating so essentially she is having residual urine in the bladder which is causing that irritation and frequency.  I talked to patient's sitter in detail, patient herself is not able to give any detailed history, she said that she never had hysterectomy but she said she had a bladder lift done in the past, and I feel that she probably needs to go back to the urologist for re-evaluation of residual urine.  She might need bladder lift again.  As such sitter felt that patient needs to be on antibiotic and I have advised her that it is not a good idea when somebody does not have a UTI to put him on antibiotic because we will cause resistant bacteria.  I am going to let her go home with instruction to talk to the family doctor Monday morning and possibly refer to urology for re-evaluation of her frequency due to residual urine. [GQ]      ED Course User Index  [GQ] Yanique Anderson MD                           Clinical Impression:  Final diagnoses:  [R35.0] Increased urinary frequency (Primary)  [R39.198] Increasing residual urine          ED Disposition Condition    Discharge Stable          ED Prescriptions    None       Follow-up Information       Follow up With Specialties Details Why Contact Info     Tomer Samaniego III, MD Internal Medicine In 2 days  [address]  [address]  [place] [address]  [phone]      Urologist                 Yanique Anderson MD  12/14/24 1011

## 2025-01-27 DIAGNOSIS — R35.0 FREQUENCY OF MICTURITION: Primary | ICD-10-CM

## 2025-01-30 ENCOUNTER — HOSPITAL ENCOUNTER (OUTPATIENT)
Dept: RADIOLOGY | Facility: HOSPITAL | Age: OVER 89
Discharge: HOME | End: 2025-01-30
Attending: STUDENT IN AN ORGANIZED HEALTH CARE EDUCATION/TRAINING PROGRAM
Payer: MEDICARE

## 2025-01-30 DIAGNOSIS — R35.0 FREQUENCY OF MICTURITION: ICD-10-CM

## 2025-01-30 PROCEDURE — 76770 US EXAM ABDO BACK WALL COMP: CPT | Mod: TC

## 2025-01-30 PROCEDURE — 74019 RADEX ABDOMEN 2 VIEWS: CPT | Mod: TC

## (undated) DEVICE — TOWEL OR BLUE STRL 16X26 4/PK

## (undated) DEVICE — SET IV EXT GENERAL 4.9ML 30IN

## (undated) DEVICE — SYR DISP LL 5CC

## (undated) DEVICE — APPLICATOR CHLORAPREP ORN 26ML

## (undated) DEVICE — CONTRAST ISOVUE M 300 15ML VIL

## (undated) DEVICE — TRAY SINGLE DOSE EPIDURAL

## (undated) DEVICE — SYR EPILOR LUER-LOK LOR 7ML

## (undated) DEVICE — GLOVE PROTEXIS LTX 8.0

## (undated) DEVICE — GLOVE PROTEXIS HYDROGEL SZ7.5

## (undated) DEVICE — NDL SPINAL SPINOCAN 22GX3.5